# Patient Record
Sex: FEMALE | Race: WHITE | Employment: PART TIME | URBAN - METROPOLITAN AREA
[De-identification: names, ages, dates, MRNs, and addresses within clinical notes are randomized per-mention and may not be internally consistent; named-entity substitution may affect disease eponyms.]

---

## 2017-02-03 RX ORDER — PHENTERMINE HYDROCHLORIDE 37.5 MG/1
37.5 TABLET ORAL
Qty: 30 TABLET | Refills: 0 | OUTPATIENT
Start: 2017-02-03

## 2017-02-06 RX ORDER — PHENTERMINE HYDROCHLORIDE 37.5 MG/1
37.5 TABLET ORAL
Qty: 30 TABLET | Refills: 0 | OUTPATIENT
Start: 2017-02-06

## 2017-02-07 ENCOUNTER — APPOINTMENT (OUTPATIENT)
Dept: LAB | Age: 32
End: 2017-02-07
Attending: FAMILY MEDICINE
Payer: COMMERCIAL

## 2017-02-07 ENCOUNTER — OFFICE VISIT (OUTPATIENT)
Dept: FAMILY MEDICINE CLINIC | Facility: CLINIC | Age: 32
End: 2017-02-07

## 2017-02-07 ENCOUNTER — TELEPHONE (OUTPATIENT)
Dept: FAMILY MEDICINE CLINIC | Facility: CLINIC | Age: 32
End: 2017-02-07

## 2017-02-07 VITALS
HEIGHT: 63.8 IN | TEMPERATURE: 98 F | DIASTOLIC BLOOD PRESSURE: 83 MMHG | HEART RATE: 90 BPM | SYSTOLIC BLOOD PRESSURE: 120 MMHG | WEIGHT: 223.81 LBS | BODY MASS INDEX: 38.68 KG/M2

## 2017-02-07 DIAGNOSIS — IMO0002 ABNORMAL HUMAN CHORIONIC GONADOTROPIN (HCG): Primary | ICD-10-CM

## 2017-02-07 DIAGNOSIS — R63.5 WEIGHT GAIN: ICD-10-CM

## 2017-02-07 DIAGNOSIS — R63.5 WEIGHT GAIN: Primary | ICD-10-CM

## 2017-02-07 LAB — TSH SERPL-ACNC: 1.14 UIU/ML (ref 0.34–5.6)

## 2017-02-07 PROCEDURE — 36415 COLL VENOUS BLD VENIPUNCTURE: CPT

## 2017-02-07 PROCEDURE — 84703 CHORIONIC GONADOTROPIN ASSAY: CPT

## 2017-02-07 PROCEDURE — 84443 ASSAY THYROID STIM HORMONE: CPT

## 2017-02-07 PROCEDURE — 99212 OFFICE O/P EST SF 10 MIN: CPT | Performed by: FAMILY MEDICINE

## 2017-02-07 PROCEDURE — 99213 OFFICE O/P EST LOW 20 MIN: CPT | Performed by: FAMILY MEDICINE

## 2017-02-07 RX ORDER — PHENTERMINE HYDROCHLORIDE 37.5 MG/1
1 TABLET ORAL EVERY MORNING
COMMUNITY
Start: 2017-02-03 | End: 2017-04-19

## 2017-02-07 NOTE — TELEPHONE ENCOUNTER
Pt informed regarding results and Dr Manzanares Scripture new orders as stated below. Pt agrees and will not take phentermine and will have lab redrawn tomorrow.

## 2017-02-07 NOTE — PROGRESS NOTES
Blood pressure 120/83, pulse 90, temperature 97.9 °F (36.6 °C), temperature source Oral, height 5' 3.8\" (1.621 m), weight 223 lb 12.8 oz (101.515 kg), not currently breastfeeding. Patient presents today with a complaint of obesity.   Started taking phen

## 2017-02-08 ENCOUNTER — APPOINTMENT (OUTPATIENT)
Dept: LAB | Age: 32
End: 2017-02-08
Attending: FAMILY MEDICINE
Payer: COMMERCIAL

## 2017-02-08 ENCOUNTER — TELEPHONE (OUTPATIENT)
Dept: FAMILY MEDICINE CLINIC | Facility: CLINIC | Age: 32
End: 2017-02-08

## 2017-02-08 DIAGNOSIS — R63.5 WEIGHT GAIN: ICD-10-CM

## 2017-02-08 DIAGNOSIS — IMO0002 ABNORMAL HUMAN CHORIONIC GONADOTROPIN (HCG): ICD-10-CM

## 2017-02-08 LAB
CHOLEST SERPL-MCNC: 145 MG/DL (ref 110–200)
GLUCOSE SERPL-MCNC: 111 MG/DL (ref 70–99)
HCG SERPL QL: POSITIVE
HDLC SERPL-MCNC: 51 MG/DL
LDLC SERPL CALC-MCNC: 81 MG/DL (ref 0–99)
NONHDLC SERPL-MCNC: 94 MG/DL
TRIGL SERPL-MCNC: 63 MG/DL (ref 1–149)

## 2017-02-08 PROCEDURE — 82947 ASSAY GLUCOSE BLOOD QUANT: CPT

## 2017-02-08 PROCEDURE — 84703 CHORIONIC GONADOTROPIN ASSAY: CPT

## 2017-02-08 PROCEDURE — 36415 COLL VENOUS BLD VENIPUNCTURE: CPT

## 2017-02-08 PROCEDURE — 80061 LIPID PANEL: CPT

## 2017-02-09 ENCOUNTER — PATIENT MESSAGE (OUTPATIENT)
Dept: FAMILY MEDICINE CLINIC | Facility: CLINIC | Age: 32
End: 2017-02-09

## 2017-02-10 NOTE — TELEPHONE ENCOUNTER
From: Jesus Mejia  To: Renetta Feliciano DO  Sent: 2/9/2017 9:02 AM CST  Subject: Test Results Question    Hi Good morning,    I had lab work done 2 days in a row for my HCG levels and was wondering if the test results can also be uploaded he in My C

## 2017-02-15 NOTE — TELEPHONE ENCOUNTER
Pt name &  verified. Result note reviewed per  [facility] [place]. Pt verbalized understanding and denied any further questions at this time.

## 2017-02-22 ENCOUNTER — OFFICE VISIT (OUTPATIENT)
Dept: OBGYN CLINIC | Facility: CLINIC | Age: 32
End: 2017-02-22

## 2017-02-22 VITALS
BODY MASS INDEX: 40.93 KG/M2 | HEART RATE: 103 BPM | SYSTOLIC BLOOD PRESSURE: 131 MMHG | HEIGHT: 63 IN | WEIGHT: 231 LBS | DIASTOLIC BLOOD PRESSURE: 83 MMHG

## 2017-02-22 DIAGNOSIS — N92.6 MISSED MENSES: Primary | ICD-10-CM

## 2017-02-22 PROBLEM — E66.01 MORBID OBESITY WITH BODY MASS INDEX (BMI) OF 40.0 OR HIGHER (HCC): Status: ACTIVE | Noted: 2017-02-22

## 2017-02-22 LAB
CONTROL LINE PRESENT WITH A CLEAR BACKGROUND (YES/NO): YES YES/NO
KIT LOT #: NORMAL NUMERIC
PREGNANCY TEST, URINE: POSITIVE

## 2017-02-22 PROCEDURE — 81025 URINE PREGNANCY TEST: CPT | Performed by: OBSTETRICS & GYNECOLOGY

## 2017-02-22 PROCEDURE — 99213 OFFICE O/P EST LOW 20 MIN: CPT | Performed by: OBSTETRICS & GYNECOLOGY

## 2017-02-22 NOTE — PROGRESS NOTES
HPI:    Patient ID: Helen Fontanez is a 27year old female. HPI  Patient here for PCV. Discussed PNC and PNV. S/P Missed Ab with D & C last year. Miscarriage Precautions reviewed. Optional and required screening reviewed.   Diet and exercise discus

## 2017-03-01 ENCOUNTER — TELEPHONE (OUTPATIENT)
Dept: OBGYN CLINIC | Facility: CLINIC | Age: 32
End: 2017-03-01

## 2017-03-01 ENCOUNTER — HOSPITAL ENCOUNTER (OUTPATIENT)
Dept: ULTRASOUND IMAGING | Facility: HOSPITAL | Age: 32
Discharge: HOME | End: 2017-03-01
Attending: OBSTETRICS & GYNECOLOGY
Payer: COMMERCIAL

## 2017-03-01 DIAGNOSIS — O26.859 SPOTTING IN EARLY PREGNANCY: Primary | ICD-10-CM

## 2017-03-01 DIAGNOSIS — O26.859 SPOTTING IN EARLY PREGNANCY: ICD-10-CM

## 2017-03-01 PROCEDURE — 76817 TRANSVAGINAL US OBSTETRIC: CPT

## 2017-03-01 PROCEDURE — 76801 OB US < 14 WKS SINGLE FETUS: CPT

## 2017-03-01 NOTE — TELEPHONE ENCOUNTER
Pt approx 7 weeks pregnant. Brownish spotting yesterday, only when she wiped x2. Once today brownish with some yellow. Minimal cramping. No intercourse in past 48 hours. Hx of missed Ab in April, had D&C.     Per JF pt come be evaluated today in office o

## 2017-03-08 ENCOUNTER — NURSE ONLY (OUTPATIENT)
Dept: OBGYN CLINIC | Facility: CLINIC | Age: 32
End: 2017-03-08

## 2017-03-08 DIAGNOSIS — Z34.01 ENCOUNTER FOR SUPERVISION OF NORMAL FIRST PREGNANCY IN FIRST TRIMESTER: Primary | ICD-10-CM

## 2017-03-08 NOTE — PROGRESS NOTES
Pt and spouce here for Ob nurse Education visit. Pt currently 7w0d with DARINEL of 10/25 based on u/s dating. Educational Material Reviewed including nutrition, exercise, restrictions and warning signs.  PN labs ordered including 1 hr gtt and TSH due to eleva

## 2017-03-21 ENCOUNTER — LAB ENCOUNTER (OUTPATIENT)
Dept: LAB | Age: 32
End: 2017-03-21
Attending: OBSTETRICS & GYNECOLOGY
Payer: COMMERCIAL

## 2017-03-21 DIAGNOSIS — Z34.01 ENCOUNTER FOR SUPERVISION OF NORMAL FIRST PREGNANCY IN FIRST TRIMESTER: ICD-10-CM

## 2017-03-21 LAB
ANTIBODY SCREEN: NEGATIVE
BASOPHILS # BLD: 0.1 K/UL (ref 0–0.2)
BASOPHILS NFR BLD: 1 %
BILIRUB UR QL: NEGATIVE
COLOR UR: YELLOW
EOSINOPHIL # BLD: 0.1 K/UL (ref 0–0.7)
EOSINOPHIL NFR BLD: 1 %
ERYTHROCYTE [DISTWIDTH] IN BLOOD BY AUTOMATED COUNT: 13.4 % (ref 11–15)
GLUCOSE 1H P 50 G GLC PO SERPL-MCNC: 173 MG/DL
GLUCOSE UR-MCNC: NEGATIVE MG/DL
HCT VFR BLD AUTO: 39 % (ref 35–48)
HGB BLD-MCNC: 13.3 G/DL (ref 12–16)
HGB UR QL STRIP.AUTO: NEGATIVE
LYMPHOCYTES # BLD: 2.3 K/UL (ref 1–4)
LYMPHOCYTES NFR BLD: 21 %
MCH RBC QN AUTO: 32.4 PG (ref 27–32)
MCHC RBC AUTO-ENTMCNC: 34.2 G/DL (ref 32–37)
MCV RBC AUTO: 94.8 FL (ref 80–100)
MONOCYTES # BLD: 0.4 K/UL (ref 0–1)
MONOCYTES NFR BLD: 4 %
NEUTROPHILS # BLD AUTO: 7.9 K/UL (ref 1.8–7.7)
NEUTROPHILS NFR BLD: 73 %
NITRITE UR QL STRIP.AUTO: NEGATIVE
PH UR: 6 [PH] (ref 5–8)
PLATELET # BLD AUTO: 225 K/UL (ref 140–400)
PMV BLD AUTO: 10.7 FL (ref 7.4–10.3)
PROT UR-MCNC: 30 MG/DL
RBC # BLD AUTO: 4.12 M/UL (ref 3.7–5.4)
RBC #/AREA URNS AUTO: 0 /HPF
RH BLOOD TYPE: POSITIVE
SP GR UR STRIP: 1.03 (ref 1–1.03)
TSH SERPL-ACNC: 0.9 UIU/ML (ref 0.34–5.6)
UROBILINOGEN UR STRIP-ACNC: <2
VIT C UR-MCNC: 40 MG/DL
WBC # BLD AUTO: 10.7 K/UL (ref 4–11)
WBC #/AREA URNS AUTO: 18 /HPF

## 2017-03-21 PROCEDURE — 86900 BLOOD TYPING SEROLOGIC ABO: CPT

## 2017-03-21 PROCEDURE — 87389 HIV-1 AG W/HIV-1&-2 AB AG IA: CPT

## 2017-03-21 PROCEDURE — 86850 RBC ANTIBODY SCREEN: CPT

## 2017-03-21 PROCEDURE — 86762 RUBELLA ANTIBODY: CPT

## 2017-03-21 PROCEDURE — 85025 COMPLETE CBC W/AUTO DIFF WBC: CPT

## 2017-03-21 PROCEDURE — 36415 COLL VENOUS BLD VENIPUNCTURE: CPT

## 2017-03-21 PROCEDURE — 84443 ASSAY THYROID STIM HORMONE: CPT

## 2017-03-21 PROCEDURE — 81001 URINALYSIS AUTO W/SCOPE: CPT

## 2017-03-21 PROCEDURE — 86901 BLOOD TYPING SEROLOGIC RH(D): CPT

## 2017-03-21 PROCEDURE — 87340 HEPATITIS B SURFACE AG IA: CPT

## 2017-03-21 PROCEDURE — 87086 URINE CULTURE/COLONY COUNT: CPT

## 2017-03-21 PROCEDURE — 86780 TREPONEMA PALLIDUM: CPT

## 2017-03-21 PROCEDURE — 82950 GLUCOSE TEST: CPT

## 2017-03-22 ENCOUNTER — TELEPHONE (OUTPATIENT)
Dept: OBGYN CLINIC | Facility: CLINIC | Age: 32
End: 2017-03-22

## 2017-03-22 DIAGNOSIS — R73.09 ABNORMAL GTT (GLUCOSE TOLERANCE TEST): Primary | ICD-10-CM

## 2017-03-22 LAB
HBV SURFACE AG SERPL QL IA: NONREACTIVE
HIV1+2 AB SERPL QL IA: NONREACTIVE
RUBV IGG SER-ACNC: 19 IU/ML

## 2017-03-22 RX ORDER — NITROFURANTOIN 25; 75 MG/1; MG/1
100 CAPSULE ORAL 2 TIMES DAILY
Qty: 10 CAPSULE | Refills: 0 | Status: SHIPPED | OUTPATIENT
Start: 2017-03-22 | End: 2017-03-27

## 2017-03-23 NOTE — TELEPHONE ENCOUNTER
----- Message from Rogelio Greenfield MD sent at 3/21/2017  2:47 PM CDT -----  U/A c/w UTI. Send in Macrobid 100 mg BID x 5 days.   #10

## 2017-03-23 NOTE — TELEPHONE ENCOUNTER
----- Message from Tomer Burton MD sent at 3/21/2017  1:37 PM CDT -----  Abnormal 1 hr GTT. Send for 3 hr GTT ASAP.

## 2017-03-24 LAB — T PALLIDUM AB SER QL: NEGATIVE

## 2017-03-29 ENCOUNTER — TELEPHONE (OUTPATIENT)
Dept: OBGYN CLINIC | Facility: CLINIC | Age: 32
End: 2017-03-29

## 2017-03-31 ENCOUNTER — LAB ENCOUNTER (OUTPATIENT)
Dept: LAB | Facility: HOSPITAL | Age: 32
End: 2017-03-31
Attending: OBSTETRICS & GYNECOLOGY
Payer: COMMERCIAL

## 2017-03-31 DIAGNOSIS — R73.09 ABNORMAL GTT (GLUCOSE TOLERANCE TEST): ICD-10-CM

## 2017-03-31 PROCEDURE — 82952 GTT-ADDED SAMPLES: CPT

## 2017-03-31 PROCEDURE — 36415 COLL VENOUS BLD VENIPUNCTURE: CPT

## 2017-03-31 PROCEDURE — 82951 GLUCOSE TOLERANCE TEST (GTT): CPT

## 2017-04-05 ENCOUNTER — TELEPHONE (OUTPATIENT)
Dept: OBGYN CLINIC | Facility: CLINIC | Age: 32
End: 2017-04-05

## 2017-04-05 DIAGNOSIS — O24.319 PREGESTATIONAL DIABETES MELLITUS, MODIFIED WHITE CLASS B: Primary | ICD-10-CM

## 2017-04-05 NOTE — TELEPHONE ENCOUNTER
PT NOTIFIED OF RESULTS AND RECS. PHONE NUMBER GIVEN FOR DIABETIC ED CENTER AND FOR THE [facility] DRS. INTERNAL REFERRAL FOR DIABETIC ED PLACED AND ORDER FAXED TO [facility].

## 2017-04-05 NOTE — TELEPHONE ENCOUNTER
----- Message from Emiliano Sanchez MD sent at 3/31/2017  2:53 PM CDT -----  Abnormal 3 hr GTT. Refer to Diabetes Center. Patient is Pre-gestational Diabetic. Patient also needs to see MFM for consultation for Pre-gestational DM ASAP.

## 2017-04-07 ENCOUNTER — INITIAL PRENATAL (OUTPATIENT)
Dept: OBGYN CLINIC | Facility: CLINIC | Age: 32
End: 2017-04-07

## 2017-04-07 VITALS
DIASTOLIC BLOOD PRESSURE: 85 MMHG | HEART RATE: 88 BPM | SYSTOLIC BLOOD PRESSURE: 142 MMHG | WEIGHT: 226 LBS | BODY MASS INDEX: 40 KG/M2

## 2017-04-07 DIAGNOSIS — O24.419 GESTATIONAL DIABETES MELLITUS (GDM) IN FIRST TRIMESTER, GESTATIONAL DIABETES METHOD OF CONTROL UNSPECIFIED: ICD-10-CM

## 2017-04-07 DIAGNOSIS — Z34.81 OTHER NORMAL PREGNANCY, NOT FIRST, FIRST TRIMESTER: Primary | ICD-10-CM

## 2017-04-07 PROBLEM — O24.319 PREGESTATIONAL DIABETES MELLITUS, MODIFIED WHITE CLASS B: Status: ACTIVE | Noted: 2017-04-07

## 2017-04-07 PROBLEM — O24.319 PREGESTATIONAL DIABETES MELLITUS, MODIFIED WHITE CLASS B (HCC): Status: ACTIVE | Noted: 2017-04-07

## 2017-04-07 PROCEDURE — 81002 URINALYSIS NONAUTO W/O SCOPE: CPT | Performed by: OBSTETRICS & GYNECOLOGY

## 2017-04-07 NOTE — PROGRESS NOTES
GC/Chlamydia Culture Done. Has appointment with MFM for FTS on 4/19/2017. Has appointment with Diabetic Educator next Monday. To do labs,  24 hr Urine, HbA1C, CMP. Will send in referral for MFM consult for Pre-Gestational DM.   Level 2 U/S and [facility]

## 2017-04-10 ENCOUNTER — HOSPITAL ENCOUNTER (OUTPATIENT)
Dept: ENDOCRINOLOGY | Facility: HOSPITAL | Age: 32
Discharge: HOME | End: 2017-04-10
Attending: OBSTETRICS & GYNECOLOGY
Payer: COMMERCIAL

## 2017-04-10 VITALS — WEIGHT: 228.31 LBS | BODY MASS INDEX: 40.45 KG/M2 | HEIGHT: 63 IN

## 2017-04-10 DIAGNOSIS — O24.410 DIET CONTROLLED GESTATIONAL DIABETES MELLITUS (GDM) IN FIRST TRIMESTER: Primary | ICD-10-CM

## 2017-04-10 NOTE — PROGRESS NOTES
Lesvia Khan  : 1985 was seen for Gestational Diabetes Counseling: Individual/Group    Date: 4/10/2017   Start time: 56 End time: 0    Obtained usual diet history:     Education:     GDM Overview:  Reviewed gestational diabetes as diagnosi or concerns. Patient verbalized understanding and has no further questions at this time.     Kd Trevino RN

## 2017-04-11 ENCOUNTER — TELEPHONE (OUTPATIENT)
Dept: OBGYN CLINIC | Facility: CLINIC | Age: 32
End: 2017-04-11

## 2017-04-11 NOTE — TELEPHONE ENCOUNTER
OFFICE RECEIVED FAX REGARDING PRESCRIPTION. STATING THEY ARE UNABLE TO START PRESCRIPTION FOR BAY CONTOUR NEXT MONITOR. PLACED IN NURSE BIN TO BE REVIEWED.

## 2017-04-12 NOTE — TELEPHONE ENCOUNTER
OFFICE RECEIVED ANOTHER FAX REGARDING NAVEEN CONTOUR NEXT MONITOR RX FROM [facility]. PLACED IN NURSE BIN TO BE REVIEWED.

## 2017-04-18 ENCOUNTER — APPOINTMENT (OUTPATIENT)
Dept: LAB | Age: 32
End: 2017-04-18
Attending: OBSTETRICS & GYNECOLOGY
Payer: COMMERCIAL

## 2017-04-18 ENCOUNTER — TELEPHONE (OUTPATIENT)
Dept: OBGYN CLINIC | Facility: CLINIC | Age: 32
End: 2017-04-18

## 2017-04-18 DIAGNOSIS — O24.419 GESTATIONAL DIABETES MELLITUS (GDM) IN FIRST TRIMESTER, GESTATIONAL DIABETES METHOD OF CONTROL UNSPECIFIED: Primary | ICD-10-CM

## 2017-04-18 DIAGNOSIS — O24.419 GESTATIONAL DIABETES MELLITUS (GDM) IN FIRST TRIMESTER, GESTATIONAL DIABETES METHOD OF CONTROL UNSPECIFIED: ICD-10-CM

## 2017-04-18 PROCEDURE — 36415 COLL VENOUS BLD VENIPUNCTURE: CPT

## 2017-04-18 PROCEDURE — 80053 COMPREHEN METABOLIC PANEL: CPT

## 2017-04-18 PROCEDURE — 84156 ASSAY OF PROTEIN URINE: CPT

## 2017-04-18 NOTE — TELEPHONE ENCOUNTER
Called pt x2 to give Dr. Roseanna Crawford message. Phone keeps breaking up during call and unable to hear each other. 24 hr urine for Protein is ordered in computer. Need to discuss with pt.

## 2017-04-18 NOTE — TELEPHONE ENCOUNTER
----- Message from Tamara Altamirano MD sent at 4/18/2017  2:01 PM CDT -----  24 hour urine for protein is in sufficient. The volume is too low for a 24 hour collection. She needs to repeat the 24 hour urine for Protein.   She needs to drink more water an

## 2017-04-18 NOTE — TELEPHONE ENCOUNTER
LM on pt's VM per pt's request. LM that she needs to repeat her 24 hr urine for protein collection. Message left that she did not have enough urine for a 24 hr collection and need to drink more water to produce more urine.  Informed that order is in the com

## 2017-04-19 ENCOUNTER — HOSPITAL ENCOUNTER (OUTPATIENT)
Dept: PERINATAL CARE | Facility: HOSPITAL | Age: 32
Discharge: HOME | End: 2017-04-19
Attending: OBSTETRICS & GYNECOLOGY
Payer: COMMERCIAL

## 2017-04-19 ENCOUNTER — TELEPHONE (OUTPATIENT)
Dept: OBGYN CLINIC | Facility: CLINIC | Age: 32
End: 2017-04-19

## 2017-04-19 VITALS — HEART RATE: 83 BPM | DIASTOLIC BLOOD PRESSURE: 68 MMHG | SYSTOLIC BLOOD PRESSURE: 113 MMHG

## 2017-04-19 DIAGNOSIS — Z36.9 FIRST TRIMESTER SCREENING: ICD-10-CM

## 2017-04-19 DIAGNOSIS — E66.01 MORBID OBESITY WITH BODY MASS INDEX (BMI) OF 40.0 OR HIGHER (HCC): ICD-10-CM

## 2017-04-19 DIAGNOSIS — O24.319 PREGESTATIONAL DIABETES MELLITUS, MODIFIED WHITE CLASS B: ICD-10-CM

## 2017-04-19 DIAGNOSIS — Z36.9 FIRST TRIMESTER SCREENING: Primary | ICD-10-CM

## 2017-04-19 PROCEDURE — 99244 OFF/OP CNSLTJ NEW/EST MOD 40: CPT | Performed by: OBSTETRICS & GYNECOLOGY

## 2017-04-19 PROCEDURE — 76813 OB US NUCHAL MEAS 1 GEST: CPT | Performed by: OBSTETRICS & GYNECOLOGY

## 2017-04-19 PROCEDURE — 76813 OB US NUCHAL MEAS 1 GEST: CPT

## 2017-04-19 NOTE — PROGRESS NOTES
Pt for 1st tri screen  Hx pregest DM  High BMI  Denies pregnancy complaints  NTD lab card lot #[id number]

## 2017-04-19 NOTE — TELEPHONE ENCOUNTER
Spoke with pt and informed pt that Dr. Roby Galicia wants her to see a GI doctor d/t her elevated ALT, 114. [facility] number [phone] given to pt and advised to schedule appt with first available doctor.  Pt verbalized understanding and agrees with

## 2017-04-19 NOTE — PROGRESS NOTES
Outpatient Maternal-Fetal Medicine Consultation    Dear Dr. Maureen Hoang,    Thank you for requesting ultrasound evaluation and maternal fetal medicine consultation on your patient Alexa Tobias.   As you are aware she is a 27year old female with a Singleto Disp: 50 strip, Rfl: 4  •  NAVEEN MICROLET LANCETS Does not apply Misc, 1 lancet by Finger stick route 4 (four) times daily.  Use as directed., Disp: 1 Box, Rfl: 3  •  Prenatal Vit-Fe Fumarate-FA (PRENATAL VITAMINS PLUS) 27-1 MG Oral Tab, Take 1 tablet by mo (IUFD). Infants of diabetic mothers (IDMs) are also at increased risk for hypoglycemia and hyperbilirubinemia.     Congenital Malformations:  Data from multiple studies have consistently shown a higher risk of major congenital malformations and miscarriage Impaired fetal growth is more common among women with diabetic vasculopathy and/or superimposed preeclampsia. It is associated with increased fetal and  morbidity and mortality, and has long-term health implications.   If there is evidence of intr 32 weeks of gestation and increasing the frequency of testing to two times per week from 36 weeks until delivery.  In complicated patients (IUGR, oligohydramnios, preeclampsia, or poorly controlled blood glucose concentrations) testing may start earlier in exercise in the evening. The patient is presently on diet therapy; her compliance with her diabetic diet regimen was reviewed and it is good. Medical Regimen Recommendation: no change (except as noted above about her diet and exercise).     We comp one of the two most common medical complications of the obese . The increased risk of type 2 diabetes is primarily related to an exaggerated increase in insulin resistance in the obese state.  It is reasonable to screen obese gravidas for undiagnosed analysis found that overweight and obese pregnant women experienced significantly more stillbirths than normal weight women. Increase  testing and Level 2 Ultrasound is recommended.      We dicussed the current recommendations for limited gest

## 2017-04-19 NOTE — TELEPHONE ENCOUNTER
----- Message from Sindhu Sanders MD sent at 4/19/2017  2:52 PM CDT -----  Any GI doctor within [facility]. First available.

## 2017-04-19 NOTE — TELEPHONE ENCOUNTER
----- Message from Nura Mejia MD sent at 4/19/2017  5:19 AM CDT -----  Elevated ALT, 114. Please refer patient to GI for a Consultation. Patient has already been diagnoses with Pre-gestational Diabetes.

## 2017-04-19 NOTE — TELEPHONE ENCOUNTER
Notified pt that Dr. Tracie Herrera wants her to see a GI doctor d/t her elevated ALT, 114. [facility] GI office number [phone] given to pt and instructed to make an appt with first available doctor that can see her.  Pt verbalized understanding and ag

## 2017-04-21 ENCOUNTER — HOSPITAL ENCOUNTER (OUTPATIENT)
Dept: ENDOCRINOLOGY | Facility: HOSPITAL | Age: 32
Discharge: HOME | End: 2017-04-21
Attending: OBSTETRICS & GYNECOLOGY
Payer: COMMERCIAL

## 2017-04-21 ENCOUNTER — ROUTINE PRENATAL (OUTPATIENT)
Dept: OBGYN CLINIC | Facility: CLINIC | Age: 32
End: 2017-04-21

## 2017-04-21 VITALS
HEART RATE: 84 BPM | WEIGHT: 225 LBS | SYSTOLIC BLOOD PRESSURE: 132 MMHG | DIASTOLIC BLOOD PRESSURE: 85 MMHG | BODY MASS INDEX: 39 KG/M2

## 2017-04-21 VITALS — WEIGHT: 225.69 LBS | HEIGHT: 64 IN | BODY MASS INDEX: 38.53 KG/M2

## 2017-04-21 DIAGNOSIS — O24.319 PREGESTATIONAL DIABETES MELLITUS, MODIFIED WHITE CLASS B: Primary | ICD-10-CM

## 2017-04-21 DIAGNOSIS — Z34.82 OTHER NORMAL PREGNANCY, NOT FIRST, SECOND TRIMESTER: Primary | ICD-10-CM

## 2017-04-21 PROCEDURE — 81002 URINALYSIS NONAUTO W/O SCOPE: CPT | Performed by: OBSTETRICS & GYNECOLOGY

## 2017-04-21 NOTE — PROGRESS NOTES
Radha Whittington  : 1985 was seen for GDM Follow-Up Counseling    Date: 2017   Start time:   End time:     Assessment: Ht 64\"  Wt 225 lb 11.2 oz  BMI 38.72 kg/m2  LMP 2017 (Exact Date)  Weight: Wt Readings from Last 6 Encounters Reducing fat and omitting the high calorie, low nutrient density foods in favor of lean protein, whole grains, fresh fruit and vegetables. Discussed adding milk to at least two meals and including [place] yogurt to improve her protein intake.      Being Activ

## 2017-04-21 NOTE — PROGRESS NOTES
Follow-up for class B, type 2 diabetes. Has been following diabetic diet. Has majority fasting blood sugars elevated as high as 110.  2 hour postprandial values within normal limits. Recommend starting NPH insulin at bedtime 4 units.   To receive instruc

## 2017-04-22 ENCOUNTER — APPOINTMENT (OUTPATIENT)
Dept: LAB | Age: 32
End: 2017-04-22
Attending: OBSTETRICS & GYNECOLOGY
Payer: COMMERCIAL

## 2017-04-22 DIAGNOSIS — O24.419 GESTATIONAL DIABETES MELLITUS (GDM) IN FIRST TRIMESTER, GESTATIONAL DIABETES METHOD OF CONTROL UNSPECIFIED: ICD-10-CM

## 2017-04-22 PROCEDURE — 84156 ASSAY OF PROTEIN URINE: CPT

## 2017-04-24 ENCOUNTER — HOSPITAL ENCOUNTER (OUTPATIENT)
Dept: ENDOCRINOLOGY | Facility: HOSPITAL | Age: 32
Discharge: HOME | End: 2017-04-24
Attending: OBSTETRICS & GYNECOLOGY
Payer: COMMERCIAL

## 2017-04-24 ENCOUNTER — TELEPHONE (OUTPATIENT)
Dept: OBGYN CLINIC | Facility: CLINIC | Age: 32
End: 2017-04-24

## 2017-04-24 ENCOUNTER — TELEPHONE (OUTPATIENT)
Dept: PERINATAL CARE | Facility: HOSPITAL | Age: 32
End: 2017-04-24

## 2017-04-24 VITALS — WEIGHT: 224.13 LBS | BODY MASS INDEX: 38 KG/M2

## 2017-04-24 DIAGNOSIS — O24.319 PREGESTATIONAL DIABETES MELLITUS, MODIFIED WHITE CLASS B: Primary | ICD-10-CM

## 2017-04-24 NOTE — PROGRESS NOTES
Nazia Siu  : 1985 was seen for Injection Instruction:    Date: 2017 Start time: 930 End time: 1000    Wt 224 lb 1.6 oz  LMP 2017 (Exact Date)      Medication type, dose and frequency: NPH insulin 4 units daily at [facility] [place]    Syringe:

## 2017-04-24 NOTE — TELEPHONE ENCOUNTER
Pharmacy woul like to know if they can dispense a 10ml bottle instead of a 3ml bottle of NPH Insulin. Pharmacy states it does not come in a 3 ml bottle and her insurance will cover a 10ml bottle. Pharmacy was advises this would be OK.

## 2017-04-25 ENCOUNTER — TELEPHONE (OUTPATIENT)
Dept: PERINATAL CARE | Facility: HOSPITAL | Age: 32
End: 2017-04-25

## 2017-04-26 ENCOUNTER — OFFICE VISIT (OUTPATIENT)
Dept: GASTROENTEROLOGY | Facility: CLINIC | Age: 32
End: 2017-04-26

## 2017-04-26 VITALS
HEART RATE: 84 BPM | WEIGHT: 226 LBS | HEIGHT: 64.5 IN | BODY MASS INDEX: 38.11 KG/M2 | DIASTOLIC BLOOD PRESSURE: 79 MMHG | SYSTOLIC BLOOD PRESSURE: 118 MMHG

## 2017-04-26 DIAGNOSIS — R79.89 ELEVATED LFTS: Primary | ICD-10-CM

## 2017-04-26 PROCEDURE — 99243 OFF/OP CNSLTJ NEW/EST LOW 30: CPT | Performed by: NURSE PRACTITIONER

## 2017-04-26 PROCEDURE — 99212 OFFICE O/P EST SF 10 MIN: CPT | Performed by: NURSE PRACTITIONER

## 2017-04-26 NOTE — PATIENT INSTRUCTIONS
1. Complete labs/Ultrasound of liver - I will call you with the results    2.  If all the above tests are normal, we will continue to monitor your liver enzymes about every 4 weeks through the pregnancy

## 2017-04-26 NOTE — H&P
[address] [facility] [address] Gastroenterology                                                                                                  Clinic History and Physical     Pa History   Diagnosis Date   • Anemia      2-3 months after D&C   • Obesity, Class III, BMI 40-49.9 (morbid obesity) ([facility] [address] 2017   • Gestational diabetes 04/2017          Past Surgical History    CHOLECYSTECTOMY  2003    D & C  04/29/2016      Family Hx:   Fa HPI  GENITOURINARY:  negative for dysuria or gross hematuria  INTEGUMENT/BREAST:  SKIN:  negative for jaundice   ALLERGIC/IMMUNOLOGIC:  negative for hay fever  ENDOCRINE:  negative for cold intolerance and heat intolerance  MUSCULOSKELETAL:  negative for j consider standing order for hepatic panel Q4 weeks during the course of the pregnancy to evaluate LFT trends and possibly complications. Discussed in detail with the patient and she is amenable to this plan.          Recommend:  See above      Orders This V

## 2017-05-02 ENCOUNTER — TELEPHONE (OUTPATIENT)
Dept: PERINATAL CARE | Facility: HOSPITAL | Age: 32
End: 2017-05-02

## 2017-05-02 NOTE — TELEPHONE ENCOUNTER
Blood glucose log from 4/11/17 through 5/1/17 reviewed by Dr. Delvin Dowling  change with current regime. 6 units NPH in AM      8 units NPH at [facility] contacted by phone and updated. She verbalized understanding. Will continue to send weekly logs.   Record

## 2017-05-03 ENCOUNTER — ROUTINE PRENATAL (OUTPATIENT)
Dept: OBGYN CLINIC | Facility: CLINIC | Age: 32
End: 2017-05-03

## 2017-05-03 VITALS
SYSTOLIC BLOOD PRESSURE: 116 MMHG | WEIGHT: 224 LBS | DIASTOLIC BLOOD PRESSURE: 73 MMHG | HEART RATE: 80 BPM | BODY MASS INDEX: 38 KG/M2

## 2017-05-03 DIAGNOSIS — Z34.82 OTHER NORMAL PREGNANCY, NOT FIRST, SECOND TRIMESTER: Primary | ICD-10-CM

## 2017-05-04 ENCOUNTER — TELEPHONE (OUTPATIENT)
Dept: PERINATAL CARE | Facility: HOSPITAL | Age: 32
End: 2017-05-04

## 2017-05-04 ENCOUNTER — LAB ENCOUNTER (OUTPATIENT)
Dept: LAB | Age: 32
End: 2017-05-04
Attending: NURSE PRACTITIONER
Payer: COMMERCIAL

## 2017-05-04 DIAGNOSIS — O24.419 GESTATIONAL DIABETES MELLITUS IN PREGNANCY: Primary | ICD-10-CM

## 2017-05-04 DIAGNOSIS — Z34.82 OTHER NORMAL PREGNANCY, NOT FIRST, SECOND TRIMESTER: ICD-10-CM

## 2017-05-04 DIAGNOSIS — R79.89 ELEVATED LFTS: ICD-10-CM

## 2017-05-04 PROCEDURE — 82728 ASSAY OF FERRITIN: CPT

## 2017-05-04 PROCEDURE — 87340 HEPATITIS B SURFACE AG IA: CPT

## 2017-05-04 PROCEDURE — 86708 HEPATITIS A ANTIBODY: CPT

## 2017-05-04 PROCEDURE — 82103 ALPHA-1-ANTITRYPSIN TOTAL: CPT

## 2017-05-04 PROCEDURE — 86704 HEP B CORE ANTIBODY TOTAL: CPT

## 2017-05-04 PROCEDURE — 86038 ANTINUCLEAR ANTIBODIES: CPT

## 2017-05-04 PROCEDURE — 82105 ALPHA-FETOPROTEIN SERUM: CPT

## 2017-05-04 PROCEDURE — 36415 COLL VENOUS BLD VENIPUNCTURE: CPT

## 2017-05-04 PROCEDURE — 84466 ASSAY OF TRANSFERRIN: CPT

## 2017-05-04 PROCEDURE — 80500 HEPATITIS A B + C PROFILE: CPT

## 2017-05-04 PROCEDURE — 86803 HEPATITIS C AB TEST: CPT

## 2017-05-04 PROCEDURE — 83540 ASSAY OF IRON: CPT

## 2017-05-04 PROCEDURE — 86706 HEP B SURFACE ANTIBODY: CPT

## 2017-05-04 PROCEDURE — 83036 HEMOGLOBIN GLYCOSYLATED A1C: CPT

## 2017-05-04 PROCEDURE — 82390 ASSAY OF CERULOPLASMIN: CPT

## 2017-05-04 NOTE — PROGRESS NOTES
Pt with fm noted. S/p mfm appt 4/19. Has u/s level 2 this month. Pt sending accucheck results to mfm weekly for adjustments for insulin.

## 2017-05-04 NOTE — TELEPHONE ENCOUNTER
Dr. Luther Self reviewed glucose log from 4/25/17 through 5/4/17  She recommends that Evelyn Lane increase her HS Insulin to 10 units of NPH and to   Keep her AM insulin at 6 units NPH  Dora verbalized understanding and will increase dose tonight.   Instruc

## 2017-05-09 ENCOUNTER — TELEPHONE (OUTPATIENT)
Dept: GASTROENTEROLOGY | Facility: CLINIC | Age: 32
End: 2017-05-09

## 2017-05-09 ENCOUNTER — HOSPITAL ENCOUNTER (OUTPATIENT)
Dept: ULTRASOUND IMAGING | Facility: HOSPITAL | Age: 32
Discharge: HOME | End: 2017-05-09
Attending: NURSE PRACTITIONER
Payer: COMMERCIAL

## 2017-05-09 DIAGNOSIS — R79.89 ELEVATED LFTS: ICD-10-CM

## 2017-05-09 DIAGNOSIS — R79.89 ELEVATED LFTS: Primary | ICD-10-CM

## 2017-05-09 PROCEDURE — 76705 ECHO EXAM OF ABDOMEN: CPT | Performed by: NURSE PRACTITIONER

## 2017-05-09 NOTE — TELEPHONE ENCOUNTER
Nursing: Please let the patient know I got all of her labs and liver ultrasound back. Everything looks within normal limits. Hepatitis panel indicates immunity to hepatitis B.     In light of this, I would continue to monitor her liver enzymes through the

## 2017-05-09 NOTE — TELEPHONE ENCOUNTER
Pt contacted and reviewed [facility] message below, the patient was appreciative and verbalized understanding. She is aware of the standing order and will complete those every 4 weeks as directed. No further questions at this time.

## 2017-05-12 ENCOUNTER — TELEPHONE (OUTPATIENT)
Dept: OBGYN CLINIC | Facility: CLINIC | Age: 32
End: 2017-05-12

## 2017-05-12 ENCOUNTER — TELEPHONE (OUTPATIENT)
Dept: PERINATAL CARE | Facility: HOSPITAL | Age: 32
End: 2017-05-12

## 2017-05-12 NOTE — TELEPHONE ENCOUNTER
Pt 17 weeks pregnant and she needs a TB test for work. Is the injection safe with pregnancy? If not, can she get a note for employee health.

## 2017-05-12 NOTE — TELEPHONE ENCOUNTER
Blood glucose log from 5/3/17 through 5/12/17 reviewed by Dr. Shannen Rome   change with current regime. Increase evening NPH to 14 units  Keep am NPH at 6 units  mathew contacted by phone and updated. She verbalized understanding.  Will continue to send week

## 2017-05-12 NOTE — TELEPHONE ENCOUNTER
[employer] PT'S JOB IS REQUESTING A TB CLEARANCE CAN SHE GET THE TN INJECTION ?  IF NOT CAN SHE GET A NOTE FOR EMPLOYEE HEALTH

## 2017-05-23 ENCOUNTER — TELEPHONE (OUTPATIENT)
Dept: PERINATAL CARE | Facility: HOSPITAL | Age: 32
End: 2017-05-23

## 2017-05-23 NOTE — TELEPHONE ENCOUNTER
Blood glucose log from 5/3/17 through 5/23/17 reviewed by Dr. Abeba Jerome   change with current regime. Increase NPH in am to 10 units and NPH at HS to 16 units  Juwan contacted by phone and updated. She verbalized understanding.  Will continue to send week

## 2017-05-24 ENCOUNTER — TELEPHONE (OUTPATIENT)
Dept: OBGYN CLINIC | Facility: CLINIC | Age: 32
End: 2017-05-24

## 2017-05-24 ENCOUNTER — HOSPITAL ENCOUNTER (OUTPATIENT)
Dept: ENDOCRINOLOGY | Facility: HOSPITAL | Age: 32
Discharge: HOME | End: 2017-05-24
Attending: OBSTETRICS & GYNECOLOGY
Payer: COMMERCIAL

## 2017-05-24 VITALS — BODY MASS INDEX: 38.21 KG/M2 | WEIGHT: 223.81 LBS | HEIGHT: 64 IN

## 2017-05-24 DIAGNOSIS — O24.319 PREGESTATIONAL DIABETES MELLITUS, MODIFIED WHITE CLASS B: Primary | ICD-10-CM

## 2017-05-24 NOTE — TELEPHONE ENCOUNTER
Per Candida Mccoy from diabetes Center, see note  Blood Glucose: FB-101 mg. PP: B: 105-147 mg; L: 101-141; D: 112-147 mg.  83% of PP bkfst & 80% of PP dinner readings are above 120 mg/dl. Ketones: All recorded as negative.    Recommendations:                  7

## 2017-05-24 NOTE — PROGRESS NOTES
Radha Stanislawaviva  : 1985 was seen for GDM Follow-Up Counseling    Date: 2017   Start time: 1355  End time: 1500    Assessment: Ht 64\"  Wt 223 lb 12.8 oz  BMI 38.40 kg/m2  LMP 2017 (Exact Date)  Weight: Wt Readings from Last 6 Encounters PP is > 120 twice in 1 week at any one meal.  Call Diabetic Educator is ketones are consistently elevated. Taking Medication:  Reviewed appropriate timing of insulin. Reviewed onset, peak times and duration.  Discussed possibility of needing rapid acting

## 2017-05-24 NOTE — TELEPHONE ENCOUNTER
ARYAN CALLING FROM  [facility]  / ARYAN WOULD LIKE TO SPEAK WITH THE NURSE BECAUSE THE PT BLOOD SUGAR IS HIGH / ARYAN WANT THE DR TO SEE THE NOTES / HERE'S ANOTHER NUMBER TO GET IN TOUCH WITH ARYAN [phone] / ALDO ADV

## 2017-05-27 NOTE — TELEPHONE ENCOUNTER
Insulin dosing recently increased per MFM. Please inform pt to contact [facility] Tuesday with her BS values to determine if she will need to add short acting insulin Novolog before breakfast and dinner as was suggested by [facility].

## 2017-05-27 NOTE — TELEPHONE ENCOUNTER
Pt called and informed to call Tuesday 5/30/17 with glucose results to determine her plan of care. Pt voices understanding and that she will call Tuesday.

## 2017-05-31 ENCOUNTER — HOSPITAL ENCOUNTER (OUTPATIENT)
Dept: PERINATAL CARE | Facility: HOSPITAL | Age: 32
Discharge: HOME | End: 2017-05-31
Attending: OBSTETRICS & GYNECOLOGY
Payer: COMMERCIAL

## 2017-05-31 VITALS
SYSTOLIC BLOOD PRESSURE: 136 MMHG | HEIGHT: 63 IN | WEIGHT: 223 LBS | RESPIRATION RATE: 16 BRPM | HEART RATE: 95 BPM | DIASTOLIC BLOOD PRESSURE: 66 MMHG | BODY MASS INDEX: 39.51 KG/M2

## 2017-05-31 DIAGNOSIS — Z79.4 DIABETES MELLITUS, TYPE II, INSULIN DEPENDENT (HCC): ICD-10-CM

## 2017-05-31 DIAGNOSIS — Z36.3 SCREENING, ANTENATAL, FOR MALFORMATION BY ULTRASOUND: ICD-10-CM

## 2017-05-31 DIAGNOSIS — O24.319 PREGESTATIONAL DIABETES MELLITUS, MODIFIED WHITE CLASS B: Primary | ICD-10-CM

## 2017-05-31 DIAGNOSIS — E66.01 MORBID OBESITY WITH BODY MASS INDEX (BMI) OF 40.0 OR HIGHER (HCC): ICD-10-CM

## 2017-05-31 DIAGNOSIS — E11.9 DIABETES MELLITUS, TYPE II, INSULIN DEPENDENT (HCC): ICD-10-CM

## 2017-05-31 DIAGNOSIS — O24.319 PREGESTATIONAL DIABETES MELLITUS, MODIFIED WHITE CLASS B: ICD-10-CM

## 2017-05-31 PROCEDURE — 76811 OB US DETAILED SNGL FETUS: CPT | Performed by: OBSTETRICS & GYNECOLOGY

## 2017-05-31 PROCEDURE — 99214 OFFICE O/P EST MOD 30 MIN: CPT | Performed by: OBSTETRICS & GYNECOLOGY

## 2017-05-31 RX ORDER — INSULIN ASPART 100 [IU]/ML
4 INJECTION, SOLUTION INTRAVENOUS; SUBCUTANEOUS 2 TIMES DAILY
Qty: 1 VIAL | Refills: 2 | Status: SHIPPED | OUTPATIENT
Start: 2017-05-31 | End: 2017-09-05

## 2017-05-31 NOTE — PROGRESS NOTES
Pt for level 2 U/S  Hx A2gdm and obesity  Denies pregnancy complaints  Pt states she feels fetal mvmt

## 2017-05-31 NOTE — PROGRESS NOTES
[facility] Serum Consultation    Dear Dr. Kota Meza,    Thank you for requesting ultrasound evaluation and maternal fetal medicine consultation on your patient Mimi Garcia As you are aware she is a 27year old female with a Singleto intrauterine fetal demise (IUFD).   Infants of diabetic mothers (IDMs) are also at increased risk for hypoglycemia and hyperbilirubinemia.     Congenital Malformations:  Data from multiple studies have consistently shown a higher risk of major congenital mal these complications.    Impaired fetal growth is more common among women with diabetic vasculopathy and/or superimposed preeclampsia.  It is associated with increased fetal and  morbidity and mortality, and has long-term health implications.  If the starting weekly NST's at 32 weeks of gestation and increasing the frequency of testing to two times per week from 36 weeks until delivery.  In complicated patients (IUGR, oligohydramnios, preeclampsia, or poorly controlled blood glucose concentrations) test dinner.  We also discussed exercise in the evening. The patient is presently on diet & NPH therapy; her compliance with her diabetic diet regimen was reviewed and it is good.      DIABETES - follow-up  ADA diet recommendations were reviewed and the cherry pregnancy has been consistently reported.  In particular, maternal weight and BMI are independent risk factors for preeclampsia.              Studies have found that the increased risk of  birth in obese gravidas is primarily associated with obesity higher rates of gestational diabetes, hypertensive complications, fetal macrosomia and delivery complications.  Women with weight loss or insufficient weight gain have higher rates of small for gestational age infants.     Macario Velasquez had her questions answer

## 2017-06-01 ENCOUNTER — ROUTINE PRENATAL (OUTPATIENT)
Dept: OBGYN CLINIC | Facility: CLINIC | Age: 32
End: 2017-06-01

## 2017-06-01 VITALS
HEART RATE: 79 BPM | BODY MASS INDEX: 40 KG/M2 | WEIGHT: 223 LBS | DIASTOLIC BLOOD PRESSURE: 76 MMHG | SYSTOLIC BLOOD PRESSURE: 114 MMHG

## 2017-06-01 DIAGNOSIS — O24.414 INSULIN CONTROLLED GESTATIONAL DIABETES MELLITUS (GDM) DURING PREGNANCY, ANTEPARTUM: ICD-10-CM

## 2017-06-01 DIAGNOSIS — Z34.82 OTHER NORMAL PREGNANCY, NOT FIRST, SECOND TRIMESTER: Primary | ICD-10-CM

## 2017-06-01 PROCEDURE — 81002 URINALYSIS NONAUTO W/O SCOPE: CPT | Performed by: OBSTETRICS & GYNECOLOGY

## 2017-06-01 NOTE — PROGRESS NOTES
[address] [facility]  Obstetrics and Gynecology  Prenatal Visit  Jud Estrada MD    HPI   Agustin Fox is a 27year old.o. [id number] 20w0d weeks. Here for routine prenatal visit and is without complaints.   Patient denies any regular uterine contractions, mouth daily. Disp:  Rfl:      Exam   /76 mmHg  Pulse 79  Wt 223 lb (101.152 kg)  LMP 01/12/2017 (Exact Date)    Assessment   Kalin Ramsey is a 27year old female [id number] with viable IUP at 20w0d weeks.   Insulin requiring gestational diabetes, maternal-f

## 2017-06-12 ENCOUNTER — TELEPHONE (OUTPATIENT)
Dept: PERINATAL CARE | Facility: HOSPITAL | Age: 32
End: 2017-06-12

## 2017-06-12 NOTE — TELEPHONE ENCOUNTER
Blood Sugar Log obt    Reviewed by Dr Argueta Alpha  Insulin recommendations  nph 10 u am  4units breakfast  6units dinner      16 u at HS    Pt states understanding and will cont to send updated BS logs

## 2017-06-28 ENCOUNTER — HOSPITAL ENCOUNTER (OUTPATIENT)
Dept: PERINATAL CARE | Facility: HOSPITAL | Age: 32
Discharge: HOME | End: 2017-06-28
Attending: OBSTETRICS & GYNECOLOGY
Payer: COMMERCIAL

## 2017-06-28 VITALS — HEART RATE: 80 BPM | SYSTOLIC BLOOD PRESSURE: 113 MMHG | DIASTOLIC BLOOD PRESSURE: 65 MMHG

## 2017-06-28 DIAGNOSIS — E66.01 MORBID OBESITY WITH BODY MASS INDEX (BMI) OF 40.0 OR HIGHER (HCC): ICD-10-CM

## 2017-06-28 DIAGNOSIS — O24.414 INSULIN CONTROLLED GESTATIONAL DIABETES MELLITUS (GDM) DURING PREGNANCY, ANTEPARTUM: Primary | ICD-10-CM

## 2017-06-28 DIAGNOSIS — O24.414 INSULIN CONTROLLED GESTATIONAL DIABETES MELLITUS (GDM) DURING PREGNANCY, ANTEPARTUM: ICD-10-CM

## 2017-06-28 PROCEDURE — 93325 DOPPLER ECHO COLOR FLOW MAPG: CPT | Performed by: OBSTETRICS & GYNECOLOGY

## 2017-06-28 PROCEDURE — 76825 ECHO EXAM OF FETAL HEART: CPT | Performed by: OBSTETRICS & GYNECOLOGY

## 2017-06-28 PROCEDURE — 99215 OFFICE O/P EST HI 40 MIN: CPT | Performed by: OBSTETRICS & GYNECOLOGY

## 2017-06-28 PROCEDURE — 76827 ECHO EXAM OF FETAL HEART: CPT | Performed by: OBSTETRICS & GYNECOLOGY

## 2017-06-28 NOTE — PROGRESS NOTES
Pt for fetal echo  Hx  pregestational DM obesity   Denies pregnancy complaints  States active fetus  BS reviewed by dr Blanquita Ortega 6/27 no change in management  Pt states understanding

## 2017-06-28 NOTE — PROGRESS NOTES
Outpatient Maternal-Fetal Medicine Consultation     Dear Dr. Hollis Ricketts,     Thank you for requesting ultrasound evaluation and maternal fetal medicine consultation on your patient Kristina Alexandra As you are aware she is a 27year old female with a Single malformations and miscarriage associated with increasing first trimester glycated hemoglobin values.  Although glycated hemoglobin values from different laboratories may not be comparable because of differences in methodology and a lack of standardization a there is evidence of intrauterine growth restriction, which is uncommon, but often related to preeclampsia or preexisting maternal vasculopathy, tests of fetal well-being are initiated.     Preeclampsia:  The incidences of hypertension and preeclampsia are testing may start earlier in gestation and is performed more frequently. Any significant deterioration in maternal status necessitates reevaluation of the fetus.  The frequency of intrauterine fetal death (excluding congenital malformations) with such ava patient's dietary compliance is good.   Her capillary blood glucose records were discussed today; her compliance with the recommended four times daily assessments (fasting and two-hour post-prandial) is good but she needs to be more consistent with her afte delivery.  It has also been hypothesized that obesity may lead to dystocia due to increased soft tissue deposition in the maternal pelvis.    delivery in the obese  is associated with numerous perioperative concerns, including emergency deli snoring and frequent waking)  · She was instructed to send in her BS log weekly for review  · Insulin:  NPH - 14 units in AM and 20 units at HS;  Novolog - 4 units at breakfast and 6 units at dinner     Thank you for allowing me to participate in the care

## 2017-07-03 ENCOUNTER — ROUTINE PRENATAL (OUTPATIENT)
Dept: OBGYN CLINIC | Facility: CLINIC | Age: 32
End: 2017-07-03

## 2017-07-03 VITALS
DIASTOLIC BLOOD PRESSURE: 75 MMHG | SYSTOLIC BLOOD PRESSURE: 109 MMHG | BODY MASS INDEX: 40 KG/M2 | HEART RATE: 90 BPM | WEIGHT: 224 LBS

## 2017-07-03 DIAGNOSIS — Z34.82 OTHER NORMAL PREGNANCY, NOT FIRST, SECOND TRIMESTER: Primary | ICD-10-CM

## 2017-07-03 LAB
APPEARANCE: CLEAR
MULTISTIX LOT#: NORMAL NUMERIC
PH, URINE: 7.5 (ref 4.5–8)
SPECIFIC GRAVITY: 1.01 (ref 1–1.03)
URINE-COLOR: YELLOW
UROBILINOGEN,SEMI-QN: 0.2 MG/DL (ref 0–1.9)

## 2017-07-17 ENCOUNTER — TELEPHONE (OUTPATIENT)
Dept: PERINATAL CARE | Facility: HOSPITAL | Age: 32
End: 2017-07-17

## 2017-07-17 NOTE — TELEPHONE ENCOUNTER
Blood glucose log from 6/29/17 through 7/14/17 reviewed by Dr. Alex Pete insulin amounts  14 u NPH AM   22u NPH HS  6 U Novolog breakfast  8 u novolog dinner  mathew contacted by phone and updated. She verbalized understanding.  Will continue to send w

## 2017-07-19 ENCOUNTER — HOSPITAL ENCOUNTER (OUTPATIENT)
Dept: PERINATAL CARE | Facility: HOSPITAL | Age: 32
Discharge: HOME | End: 2017-07-19
Attending: OBSTETRICS & GYNECOLOGY
Payer: COMMERCIAL

## 2017-07-19 VITALS — HEART RATE: 89 BPM | DIASTOLIC BLOOD PRESSURE: 75 MMHG | SYSTOLIC BLOOD PRESSURE: 117 MMHG

## 2017-07-19 DIAGNOSIS — O24.414 INSULIN CONTROLLED GESTATIONAL DIABETES MELLITUS (GDM) DURING PREGNANCY, ANTEPARTUM: ICD-10-CM

## 2017-07-19 DIAGNOSIS — E66.01 MORBID OBESITY WITH BODY MASS INDEX (BMI) OF 40.0 OR HIGHER (HCC): Primary | ICD-10-CM

## 2017-07-19 DIAGNOSIS — E66.01 MORBID OBESITY WITH BODY MASS INDEX (BMI) OF 40.0 OR HIGHER (HCC): ICD-10-CM

## 2017-07-19 PROCEDURE — 76805 OB US >/= 14 WKS SNGL FETUS: CPT | Performed by: OBSTETRICS & GYNECOLOGY

## 2017-07-19 PROCEDURE — 99213 OFFICE O/P EST LOW 20 MIN: CPT | Performed by: OBSTETRICS & GYNECOLOGY

## 2017-07-19 NOTE — PROGRESS NOTES
Outpatient Maternal-Fetal Medicine Consultation     Dear Dr. Caitlyn Odonnell,     Thank you for requesting ultrasound evaluation and maternal fetal medicine consultation on your patient Timothy Area As you are aware she is a 27year old female with a Single ULTRASOUND REPORT     See imaging tab for complete consultation / ultrasound report or in PACS    Fetal Heart Rate: Present 152 bpm  Fetal Presentation: Breech  Amniotic fluid MVP: WNL  Cord: 3 vessel cord  Placental Location: Posterior     Summary of Ultr

## 2017-07-21 ENCOUNTER — ROUTINE PRENATAL (OUTPATIENT)
Dept: OBGYN CLINIC | Facility: CLINIC | Age: 32
End: 2017-07-21

## 2017-07-21 VITALS
SYSTOLIC BLOOD PRESSURE: 105 MMHG | DIASTOLIC BLOOD PRESSURE: 73 MMHG | WEIGHT: 224.63 LBS | HEART RATE: 72 BPM | BODY MASS INDEX: 40 KG/M2

## 2017-07-21 DIAGNOSIS — Z34.82 ENCOUNTER FOR SUPERVISION OF OTHER NORMAL PREGNANCY IN SECOND TRIMESTER: Primary | ICD-10-CM

## 2017-07-21 LAB
MULTISTIX LOT#: NORMAL NUMERIC
PH, URINE: 6 (ref 4.5–8)
SPECIFIC GRAVITY: 1.01 (ref 1–1.03)
URINE-COLOR: YELLOW
UROBILINOGEN,SEMI-QN: 0.2 MG/DL (ref 0–1.9)

## 2017-08-01 ENCOUNTER — TELEPHONE (OUTPATIENT)
Dept: PERINATAL CARE | Facility: HOSPITAL | Age: 32
End: 2017-08-01

## 2017-08-01 ENCOUNTER — OFFICE VISIT (OUTPATIENT)
Dept: OBGYN CLINIC | Facility: CLINIC | Age: 32
End: 2017-08-01

## 2017-08-01 VITALS
HEART RATE: 77 BPM | WEIGHT: 225.81 LBS | BODY MASS INDEX: 40 KG/M2 | SYSTOLIC BLOOD PRESSURE: 110 MMHG | DIASTOLIC BLOOD PRESSURE: 75 MMHG

## 2017-08-01 DIAGNOSIS — M54.9 BACK PAIN AFFECTING PREGNANCY IN THIRD TRIMESTER: ICD-10-CM

## 2017-08-01 DIAGNOSIS — Z34.83 ENCOUNTER FOR SUPERVISION OF OTHER NORMAL PREGNANCY IN THIRD TRIMESTER: Primary | ICD-10-CM

## 2017-08-01 DIAGNOSIS — Z23 NEED FOR VACCINATION: ICD-10-CM

## 2017-08-01 DIAGNOSIS — O99.891 BACK PAIN AFFECTING PREGNANCY IN THIRD TRIMESTER: ICD-10-CM

## 2017-08-01 LAB
MULTISTIX LOT#: NORMAL NUMERIC
PH, URINE: 7.5 (ref 4.5–8)
SPECIFIC GRAVITY: 1.01 (ref 1–1.03)
URINE-COLOR: YELLOW
UROBILINOGEN,SEMI-QN: 0.2 MG/DL (ref 0–1.9)

## 2017-08-01 PROCEDURE — 90471 IMMUNIZATION ADMIN: CPT | Performed by: ADVANCED PRACTICE MIDWIFE

## 2017-08-01 PROCEDURE — 90715 TDAP VACCINE 7 YRS/> IM: CPT | Performed by: ADVANCED PRACTICE MIDWIFE

## 2017-08-01 RX ORDER — BREAST PUMP
EACH MISCELLANEOUS
Qty: 1 EACH | Refills: 0 | Status: SHIPPED | OUTPATIENT
Start: 2017-08-01 | End: 2017-11-14

## 2017-08-01 NOTE — TELEPHONE ENCOUNTER
Pharmacy for Maryuri Palacios calling for refill of insulin syringes  Initial order was 1x per day insulin  Pt now takes insulin 3x per day  Authorization given to increase amount of syringes dispensed

## 2017-08-01 NOTE — PROGRESS NOTES
S.  Denies HA, URQ pain, swelling, vision change. Baby is active. Works for the clinic. O.  Blood sugar wnl. A.  Pregestational Diabetes  Elevated BMI  Back pain  P.   Chart reviewed [id number] DARINEL 10/19/17, Anne evans 5 d, Problem list updated [place]

## 2017-08-03 ENCOUNTER — TELEPHONE (OUTPATIENT)
Dept: PERINATAL CARE | Facility: HOSPITAL | Age: 32
End: 2017-08-03

## 2017-08-03 NOTE — TELEPHONE ENCOUNTER
Blood sugar log from 7/18/17- 8/1/17 obtained  Reviewed by Dr Damian Aparicio in  Insulin   Increase NPH to 16 units in am   Keep NPH at 22 U HS  Novolog increase to 8 u at Breakfast and 10 units with Dinner  lvmtcb to confirm new insulin RX

## 2017-08-07 ENCOUNTER — APPOINTMENT (OUTPATIENT)
Dept: PERINATAL CARE | Facility: HOSPITAL | Age: 32
End: 2017-08-07
Attending: OBSTETRICS & GYNECOLOGY
Payer: COMMERCIAL

## 2017-08-07 ENCOUNTER — HOSPITAL ENCOUNTER (OUTPATIENT)
Facility: HOSPITAL | Age: 32
Setting detail: OBSERVATION
Discharge: HOME | End: 2017-08-07
Attending: OBSTETRICS & GYNECOLOGY | Admitting: OBSTETRICS & GYNECOLOGY
Payer: COMMERCIAL

## 2017-08-07 ENCOUNTER — PATIENT MESSAGE (OUTPATIENT)
Dept: OBGYN CLINIC | Facility: CLINIC | Age: 32
End: 2017-08-07

## 2017-08-07 VITALS
WEIGHT: 224 LBS | HEIGHT: 64 IN | DIASTOLIC BLOOD PRESSURE: 75 MMHG | HEART RATE: 86 BPM | SYSTOLIC BLOOD PRESSURE: 116 MMHG | BODY MASS INDEX: 38.24 KG/M2

## 2017-08-07 DIAGNOSIS — O24.414 INSULIN CONTROLLED GESTATIONAL DIABETES MELLITUS (GDM) DURING PREGNANCY, ANTEPARTUM: Primary | ICD-10-CM

## 2017-08-07 DIAGNOSIS — O26.893 HEADACHE IN PREGNANCY, THIRD TRIMESTER: ICD-10-CM

## 2017-08-07 DIAGNOSIS — E66.01 MORBID OBESITY WITH BODY MASS INDEX (BMI) OF 40.0 OR HIGHER (HCC): ICD-10-CM

## 2017-08-07 DIAGNOSIS — R51.9 HEADACHE IN PREGNANCY, THIRD TRIMESTER: ICD-10-CM

## 2017-08-07 PROBLEM — O26.899 HEADACHE IN PREGNANCY: Status: ACTIVE | Noted: 2017-08-07

## 2017-08-07 PROBLEM — O26.899 HEADACHE IN PREGNANCY (HCC): Status: ACTIVE | Noted: 2017-08-07

## 2017-08-07 LAB
ALT SERPL-CCNC: 73 U/L (ref 14–54)
AST SERPL-CCNC: 31 U/L (ref 15–41)
BASOPHILS # BLD: 0 K/UL (ref 0–0.2)
BASOPHILS NFR BLD: 0 %
EOSINOPHIL # BLD: 0.1 K/UL (ref 0–0.7)
EOSINOPHIL NFR BLD: 1 %
ERYTHROCYTE [DISTWIDTH] IN BLOOD BY AUTOMATED COUNT: 13.2 % (ref 11–15)
HCT VFR BLD AUTO: 37.6 % (ref 35–48)
HGB BLD-MCNC: 12.6 G/DL (ref 12–16)
LYMPHOCYTES # BLD: 2.3 K/UL (ref 1–4)
LYMPHOCYTES NFR BLD: 17 %
MCH RBC QN AUTO: 31.4 PG (ref 27–32)
MCHC RBC AUTO-ENTMCNC: 33.6 G/DL (ref 32–37)
MCV RBC AUTO: 93.6 FL (ref 80–100)
MONOCYTES # BLD: 0.9 K/UL (ref 0–1)
MONOCYTES NFR BLD: 7 %
NEUTROPHILS # BLD AUTO: 10 K/UL (ref 1.8–7.7)
NEUTROPHILS NFR BLD: 75 %
PLATELET # BLD AUTO: 218 K/UL (ref 140–400)
PMV BLD AUTO: 10.8 FL (ref 7.4–10.3)
RBC # BLD AUTO: 4.02 M/UL (ref 3.7–5.4)
URATE SERPL-MCNC: 4.3 MG/DL (ref 2.1–7.4)
WBC # BLD AUTO: 13.3 K/UL (ref 4–11)

## 2017-08-07 PROCEDURE — 76811 OB US DETAILED SNGL FETUS: CPT | Performed by: OBSTETRICS & GYNECOLOGY

## 2017-08-07 PROCEDURE — 59025 FETAL NON-STRESS TEST: CPT | Performed by: OBSTETRICS & GYNECOLOGY

## 2017-08-07 RX ORDER — ACETAMINOPHEN 500 MG
TABLET ORAL
Status: COMPLETED
Start: 2017-08-07 | End: 2017-08-07

## 2017-08-07 RX ORDER — ACETAMINOPHEN 500 MG
500 TABLET ORAL EVERY 6 HOURS PRN
Status: DISCONTINUED | OUTPATIENT
Start: 2017-08-07 | End: 2017-08-07

## 2017-08-07 RX ORDER — ACETAMINOPHEN 500 MG
1000 TABLET ORAL EVERY 6 HOURS PRN
Status: DISCONTINUED | OUTPATIENT
Start: 2017-08-07 | End: 2017-08-07

## 2017-08-07 NOTE — CONSULTS
Cynthia Patient Status:  Observation    1985 MRN [id number]   Location [address] [facility] Attending Gallo Villarreal MD   Hosp Day # 0 PCP Letty Soto.  DO LARRY Li Prenatal Vit-Fe Fumarate-FA (PRENATAL VITAMINS PLUS) 27-1 MG Oral Tab Take 1 tablet by mouth daily.  Disp:  Rfl:  Taking       Allergies:  No Known Allergies     Past Medical History:  Past Medical History:   Diagnosis Date   • Anemia     2-3 months after structural abnormalities seen today  4. Type II diabetes  5. Morbid obesity  6. Headache with elevated ALT- rule out preeclampsia. Her blood pressures have been great and no other signs of preeclampsia. PLAN:  1. Give Tylenol for HA  2.  Start 24 hour

## 2017-08-07 NOTE — TELEPHONE ENCOUNTER
Headache yesterday afternoon, 7/10, has not taken anything, no swelling, yesterday had stomach pain, feels light head, no visual disturbances, feels hot flashes while sitting, while walking feels uneasy,  baby moving, per mlm to go to [facility], Patient informed

## 2017-08-07 NOTE — TELEPHONE ENCOUNTER
From: Jonathon Rivera  To: Jama Rodriguez MD  Sent: 8/7/2017 11:32 AM CDT  Subject: Other    Good morning, I wanted to reach out to someone to ask what could be the cause of my headaches that started yesterday evening and haven't gone away.  At times I

## 2017-08-07 NOTE — TRIAGE
[facility]      Triage Note    Oralia Paiz Patient Status:  Observation    1985 MRN [id number]   Location [address] Attending Omari Ko MD   Hosp Day # 0 PCP No Reese.  Adrian Espinosa No           Nonstress Test Interpretation: Appropriate for gestational age                      FHR Category: Category I           Additional Comments Comments: Strip has been viewed by Dr. Sourav Wei.  Pt home with 24hr urine collection container with instr

## 2017-08-09 ENCOUNTER — HOSPITAL ENCOUNTER (OUTPATIENT)
Facility: HOSPITAL | Age: 32
Discharge: HOME | End: 2017-08-09
Attending: OBSTETRICS & GYNECOLOGY | Admitting: OBSTETRICS & GYNECOLOGY
Payer: COMMERCIAL

## 2017-08-09 ENCOUNTER — APPOINTMENT (OUTPATIENT)
Dept: OBGYN CLINIC | Facility: HOSPITAL | Age: 32
End: 2017-08-09
Payer: COMMERCIAL

## 2017-08-09 ENCOUNTER — APPOINTMENT (OUTPATIENT)
Dept: LAB | Facility: HOSPITAL | Age: 32
End: 2017-08-09
Attending: OBSTETRICS & GYNECOLOGY
Payer: COMMERCIAL

## 2017-08-09 VITALS — SYSTOLIC BLOOD PRESSURE: 102 MMHG | HEART RATE: 114 BPM | DIASTOLIC BLOOD PRESSURE: 60 MMHG

## 2017-08-09 DIAGNOSIS — R51.9 HEADACHE IN PREGNANCY, THIRD TRIMESTER: ICD-10-CM

## 2017-08-09 DIAGNOSIS — O26.893 HEADACHE IN PREGNANCY, THIRD TRIMESTER: ICD-10-CM

## 2017-08-09 LAB
ALT SERPL-CCNC: 69 U/L (ref 14–54)
AST SERPL-CCNC: 31 U/L (ref 15–41)
BASOPHILS # BLD: 0 K/UL (ref 0–0.2)
BASOPHILS NFR BLD: 0 %
BILIRUB UR QL STRIP: NEGATIVE
CLARITY UR: CLEAR
EOSINOPHIL # BLD: 0.1 K/UL (ref 0–0.7)
EOSINOPHIL NFR BLD: 1 %
ERYTHROCYTE [DISTWIDTH] IN BLOOD BY AUTOMATED COUNT: 13.4 % (ref 11–15)
GLUCOSE UR STRIP-MCNC: NEGATIVE MG/DL
HCT VFR BLD AUTO: 36.9 % (ref 35–48)
HGB BLD-MCNC: 12.6 G/DL (ref 12–16)
HGB UR QL STRIP: NEGATIVE
KETONES UR STRIP-MCNC: NEGATIVE MG/DL
LYMPHOCYTES # BLD: 2.4 K/UL (ref 1–4)
LYMPHOCYTES NFR BLD: 18 %
MCH RBC QN AUTO: 31.7 PG (ref 27–32)
MCHC RBC AUTO-ENTMCNC: 34.1 G/DL (ref 32–37)
MCV RBC AUTO: 93 FL (ref 80–100)
MONOCYTES # BLD: 0.8 K/UL (ref 0–1)
MONOCYTES NFR BLD: 6 %
NEUTROPHILS # BLD AUTO: 10.1 K/UL (ref 1.8–7.7)
NEUTROPHILS NFR BLD: 75 %
NITRITE UR QL STRIP: NEGATIVE
PH UR STRIP: 7.5 [PH]
PLATELET # BLD AUTO: 203 K/UL (ref 140–400)
PMV BLD AUTO: 10.9 FL (ref 7.4–10.3)
PROT 24H UR-MRATE: 187.2 MG/24H (ref 0–150)
PROT UR STRIP-MCNC: 30 MG/DL
RBC # BLD AUTO: 3.97 M/UL (ref 3.7–5.4)
SP GR UR STRIP: 1.02
SPECIMEN VOL 24H UR: 2080 ML/24H
URATE SERPL-MCNC: 4 MG/DL (ref 2.1–7.4)
UROBILINOGEN UR STRIP-ACNC: <2 MG/DL
WBC # BLD AUTO: 13.3 K/UL (ref 4–11)

## 2017-08-09 PROCEDURE — 59025 FETAL NON-STRESS TEST: CPT | Performed by: OBSTETRICS & GYNECOLOGY

## 2017-08-09 PROCEDURE — 85025 COMPLETE CBC W/AUTO DIFF WBC: CPT | Performed by: OBSTETRICS & GYNECOLOGY

## 2017-08-09 PROCEDURE — 84550 ASSAY OF BLOOD/URIC ACID: CPT | Performed by: OBSTETRICS & GYNECOLOGY

## 2017-08-09 PROCEDURE — 84450 TRANSFERASE (AST) (SGOT): CPT | Performed by: OBSTETRICS & GYNECOLOGY

## 2017-08-09 PROCEDURE — 36415 COLL VENOUS BLD VENIPUNCTURE: CPT

## 2017-08-09 PROCEDURE — 84156 ASSAY OF PROTEIN URINE: CPT

## 2017-08-09 PROCEDURE — 84460 ALANINE AMINO (ALT) (SGPT): CPT | Performed by: OBSTETRICS & GYNECOLOGY

## 2017-08-09 NOTE — NST
Nonstress Test   Patient: Jesus Mejia    Gestation: 29w6d    NST:  SCHEDULED NST AS FOLLOW UP FROM DIZZY SPELL TWO DAYS AGO.   ALSO A2GDM       Variability: Moderate           Accelerations: Yes           Decelerations: None            Baseline: 140 BP

## 2017-08-15 ENCOUNTER — OFFICE VISIT (OUTPATIENT)
Dept: OBGYN CLINIC | Facility: CLINIC | Age: 32
End: 2017-08-15

## 2017-08-15 VITALS — WEIGHT: 226 LBS | SYSTOLIC BLOOD PRESSURE: 127 MMHG | BODY MASS INDEX: 39 KG/M2 | DIASTOLIC BLOOD PRESSURE: 81 MMHG

## 2017-08-15 DIAGNOSIS — Z34.92 NORMAL PREGNANCY, SECOND TRIMESTER: Primary | ICD-10-CM

## 2017-08-15 LAB
APPEARANCE: CLEAR
MULTISTIX LOT#: NORMAL NUMERIC
PH, URINE: 6 (ref 4.5–8)
SPECIFIC GRAVITY: 1.01 (ref 1–1.03)
URINE-COLOR: YELLOW
UROBILINOGEN,SEMI-QN: 0 MG/DL (ref 0–1.9)

## 2017-08-16 ENCOUNTER — APPOINTMENT (OUTPATIENT)
Dept: LAB | Facility: HOSPITAL | Age: 32
End: 2017-08-16
Attending: OBSTETRICS & GYNECOLOGY
Payer: COMMERCIAL

## 2017-08-16 ENCOUNTER — TELEPHONE (OUTPATIENT)
Dept: OBGYN CLINIC | Facility: CLINIC | Age: 32
End: 2017-08-16

## 2017-08-16 DIAGNOSIS — Z34.83 ENCOUNTER FOR SUPERVISION OF OTHER NORMAL PREGNANCY IN THIRD TRIMESTER: ICD-10-CM

## 2017-08-16 DIAGNOSIS — Z34.83 ENCOUNTER FOR SUPERVISION OF OTHER NORMAL PREGNANCY IN THIRD TRIMESTER: Primary | ICD-10-CM

## 2017-08-16 LAB
ALBUMIN SERPL BCP-MCNC: 3 G/DL (ref 3.5–4.8)
ALP SERPL-CCNC: 82 U/L (ref 32–100)
ALT SERPL-CCNC: 55 U/L (ref 14–54)
ALT SERPL-CCNC: 55 U/L (ref 14–54)
AST SERPL-CCNC: 29 U/L (ref 15–41)
AST SERPL-CCNC: 29 U/L (ref 15–41)
BILIRUB DIRECT SERPL-MCNC: 0.1 MG/DL (ref 0–0.2)
BILIRUB SERPL-MCNC: 0.6 MG/DL (ref 0.3–1.2)
PROT SERPL-MCNC: 6.7 G/DL (ref 5.9–8.4)

## 2017-08-16 PROCEDURE — 80076 HEPATIC FUNCTION PANEL: CPT

## 2017-08-16 PROCEDURE — 36415 COLL VENOUS BLD VENIPUNCTURE: CPT

## 2017-08-16 PROCEDURE — 84450 TRANSFERASE (AST) (SGOT): CPT

## 2017-08-16 PROCEDURE — 84460 ALANINE AMINO (ALT) (SGPT): CPT

## 2017-08-16 NOTE — TELEPHONE ENCOUNTER
Orders placed in Epic for asl/alt and lft. Attempted to call pt, but no anser. Left message on voicemail to call the office asap.

## 2017-08-16 NOTE — TELEPHONE ENCOUNTER
Pt had elevated lft 8/7, trended down 8/9, 24 hr urine protein was 187, s/p consult with dr Donn Cox. D/w dr Milvia Carballo, on call, and will repeat lft today. Pt was doing well in office visit yesterday. Tasked to staff.   Order ast /alt, pt to go to lab today

## 2017-08-16 NOTE — TELEPHONE ENCOUNTER
Pt returned call and informed that she needs to go to the lab today and have her asl/alt and lft drawn. Pt voices understanding. Pt voices she will be able to have the labs drawn by 2 pm today.

## 2017-08-18 ENCOUNTER — TELEPHONE (OUTPATIENT)
Dept: OBGYN CLINIC | Facility: CLINIC | Age: 32
End: 2017-08-18

## 2017-08-23 ENCOUNTER — HOSPITAL ENCOUNTER (OUTPATIENT)
Dept: PERINATAL CARE | Facility: HOSPITAL | Age: 32
Discharge: HOME | End: 2017-08-23
Attending: OBSTETRICS & GYNECOLOGY
Payer: COMMERCIAL

## 2017-08-23 ENCOUNTER — APPOINTMENT (OUTPATIENT)
Dept: LAB | Facility: HOSPITAL | Age: 32
End: 2017-08-23
Attending: OBSTETRICS & GYNECOLOGY
Payer: COMMERCIAL

## 2017-08-23 ENCOUNTER — ROUTINE PRENATAL (OUTPATIENT)
Dept: OBGYN CLINIC | Facility: CLINIC | Age: 32
End: 2017-08-23

## 2017-08-23 VITALS
HEART RATE: 81 BPM | DIASTOLIC BLOOD PRESSURE: 72 MMHG | WEIGHT: 225.38 LBS | SYSTOLIC BLOOD PRESSURE: 103 MMHG | BODY MASS INDEX: 39 KG/M2

## 2017-08-23 VITALS — HEART RATE: 89 BPM | SYSTOLIC BLOOD PRESSURE: 115 MMHG | DIASTOLIC BLOOD PRESSURE: 76 MMHG

## 2017-08-23 DIAGNOSIS — O24.414 INSULIN CONTROLLED GESTATIONAL DIABETES MELLITUS (GDM) DURING PREGNANCY, ANTEPARTUM: ICD-10-CM

## 2017-08-23 DIAGNOSIS — O24.414 INSULIN CONTROLLED GESTATIONAL DIABETES MELLITUS (GDM) DURING PREGNANCY, ANTEPARTUM: Primary | ICD-10-CM

## 2017-08-23 DIAGNOSIS — E66.01 MORBID OBESITY WITH BODY MASS INDEX (BMI) OF 40.0 OR HIGHER (HCC): ICD-10-CM

## 2017-08-23 DIAGNOSIS — R79.89 ELEVATED LFTS: ICD-10-CM

## 2017-08-23 DIAGNOSIS — Z34.83 ENCOUNTER FOR SUPERVISION OF OTHER NORMAL PREGNANCY IN THIRD TRIMESTER: Primary | ICD-10-CM

## 2017-08-23 DIAGNOSIS — O24.319 PREGESTATIONAL DIABETES MELLITUS, MODIFIED WHITE CLASS B: ICD-10-CM

## 2017-08-23 LAB
ALBUMIN SERPL BCP-MCNC: 3 G/DL (ref 3.5–4.8)
ALP SERPL-CCNC: 86 U/L (ref 32–100)
ALT SERPL-CCNC: 53 U/L (ref 14–54)
AST SERPL-CCNC: 39 U/L (ref 15–41)
BILIRUB DIRECT SERPL-MCNC: 0.4 MG/DL (ref 0–0.2)
BILIRUB SERPL-MCNC: 1.1 MG/DL (ref 0.3–1.2)
MULTISTIX LOT#: NORMAL NUMERIC
PH, URINE: 6.5 (ref 4.5–8)
PROT SERPL-MCNC: 6.6 G/DL (ref 5.9–8.4)
SPECIFIC GRAVITY: 1.01 (ref 1–1.03)
UROBILINOGEN,SEMI-QN: 0.2 MG/DL (ref 0–1.9)

## 2017-08-23 PROCEDURE — 76819 FETAL BIOPHYS PROFIL W/O NST: CPT | Performed by: OBSTETRICS & GYNECOLOGY

## 2017-08-23 PROCEDURE — 36415 COLL VENOUS BLD VENIPUNCTURE: CPT

## 2017-08-23 PROCEDURE — 76805 OB US >/= 14 WKS SNGL FETUS: CPT | Performed by: OBSTETRICS & GYNECOLOGY

## 2017-08-23 PROCEDURE — 80076 HEPATIC FUNCTION PANEL: CPT

## 2017-08-23 PROCEDURE — 99214 OFFICE O/P EST MOD 30 MIN: CPT | Performed by: OBSTETRICS & GYNECOLOGY

## 2017-08-23 NOTE — ADDENDUM NOTE
Encounter addended by: Nida Tinsley on: 8/23/2017  5:03 PM<BR>    Actions taken: Charge Capture section accepted

## 2017-08-23 NOTE — PROGRESS NOTES
Outpatient Maternal-Fetal Medicine Consultation     Dear Dr. Tracie Herrera,     Thank you for requesting ultrasound evaluation and maternal fetal medicine consultation on your patient Kelvin George As you are aware she is a 27year old female with a Single ultrasounds and antepartum testing.     OB ULTRASOUND REPORT      See imaging tab for complete consultation / ultrasound report or in PACS     Fetal Heart Rate: Present 138 bpm  Fetal Presentation: Cephalic  Amniotic fluid MVP: WNL  Cord: 3 vessel cord  Pl

## 2017-08-23 NOTE — PROGRESS NOTES
Pt for growth U/S  Hx A2gdm and obesity  Denies pregnancy complaints  States active fetus  bs log obt for MD to reciew

## 2017-08-24 ENCOUNTER — TELEPHONE (OUTPATIENT)
Dept: OBGYN CLINIC | Facility: CLINIC | Age: 32
End: 2017-08-24

## 2017-08-24 ENCOUNTER — APPOINTMENT (OUTPATIENT)
Dept: OBGYN CLINIC | Facility: HOSPITAL | Age: 32
End: 2017-08-24
Payer: COMMERCIAL

## 2017-08-24 ENCOUNTER — HOSPITAL ENCOUNTER (OUTPATIENT)
Facility: HOSPITAL | Age: 32
Discharge: HOME | End: 2017-08-24
Attending: OBSTETRICS & GYNECOLOGY | Admitting: OBSTETRICS & GYNECOLOGY
Payer: COMMERCIAL

## 2017-08-24 VITALS — DIASTOLIC BLOOD PRESSURE: 64 MMHG | HEART RATE: 105 BPM | SYSTOLIC BLOOD PRESSURE: 113 MMHG

## 2017-08-24 PROBLEM — O24.319 MODIFIED WHITE CLASS B PREGESTATIONAL DIABETES MELLITUS (HCC): Status: ACTIVE | Noted: 2017-04-07

## 2017-08-24 PROBLEM — O24.319 MODIFIED WHITE CLASS B PREGESTATIONAL DIABETES MELLITUS: Status: ACTIVE | Noted: 2017-04-07

## 2017-08-24 PROCEDURE — 59025 FETAL NON-STRESS TEST: CPT | Performed by: OBSTETRICS & GYNECOLOGY

## 2017-08-24 NOTE — TELEPHONE ENCOUNTER
Order for breast pump faxed, fax confirmation received. Spoke with pt and informed breast pump order was faxed and she can come in to  script. Pt agreed and voiced understanding.

## 2017-08-24 NOTE — TELEPHONE ENCOUNTER
PER PT STATE SHE WAS SEEN IN THE OFFICE YESTERDAY / PT STATE SHE LEFT THE SCRIPT / A PRINT OUT OF HER INSURANCE INFO  / FOR AN BREAST PUMP / PT STATE SHE ASK THE NURSE TO FAX THAT INFO / PT STATE SHE LEFT / PT WANT TO KNOW IF SHE CAN COME  THE SCRIP

## 2017-08-29 ENCOUNTER — TELEPHONE (OUTPATIENT)
Dept: OBGYN CLINIC | Facility: CLINIC | Age: 32
End: 2017-08-29

## 2017-08-29 DIAGNOSIS — R82.998 URINE LEUKOCYTES: Primary | ICD-10-CM

## 2017-08-29 NOTE — TELEPHONE ENCOUNTER
Spoke with pt and informed she needs to return to lab for urine culture. Pt agreed and voiced understanding.

## 2017-08-30 ENCOUNTER — APPOINTMENT (OUTPATIENT)
Dept: LAB | Facility: HOSPITAL | Age: 32
End: 2017-08-30
Attending: OBSTETRICS & GYNECOLOGY
Payer: COMMERCIAL

## 2017-08-30 DIAGNOSIS — R82.998 URINE LEUKOCYTES: ICD-10-CM

## 2017-08-30 PROCEDURE — 87086 URINE CULTURE/COLONY COUNT: CPT

## 2017-08-31 ENCOUNTER — HOSPITAL ENCOUNTER (OUTPATIENT)
Facility: HOSPITAL | Age: 32
Discharge: HOME | End: 2017-08-31
Attending: OBSTETRICS & GYNECOLOGY | Admitting: OBSTETRICS & GYNECOLOGY
Payer: COMMERCIAL

## 2017-08-31 ENCOUNTER — APPOINTMENT (OUTPATIENT)
Dept: OBGYN CLINIC | Facility: HOSPITAL | Age: 32
End: 2017-08-31
Payer: COMMERCIAL

## 2017-08-31 PROCEDURE — 59025 FETAL NON-STRESS TEST: CPT | Performed by: OBSTETRICS & GYNECOLOGY

## 2017-08-31 NOTE — NST
Nonstress Test   Patient: Ja Ceron    Gestation: 33w0d    NST:A2GDM, HIGH BMI       Variability: Moderate           Accelerations: Yes           Decelerations: None            Baseline: 140 BPM           Uterine Irritability: No           Contracti

## 2017-09-02 ENCOUNTER — OFFICE VISIT (OUTPATIENT)
Dept: DERMATOLOGY CLINIC | Facility: CLINIC | Age: 32
End: 2017-09-02

## 2017-09-02 DIAGNOSIS — L83 ACQUIRED ACANTHOSIS NIGRICANS: ICD-10-CM

## 2017-09-02 DIAGNOSIS — L71.9 ROSACEA: Primary | ICD-10-CM

## 2017-09-02 DIAGNOSIS — B36.0 TINEA VERSICOLOR: ICD-10-CM

## 2017-09-02 PROCEDURE — 99212 OFFICE O/P EST SF 10 MIN: CPT | Performed by: DERMATOLOGY

## 2017-09-02 PROCEDURE — 99202 OFFICE O/P NEW SF 15 MIN: CPT | Performed by: DERMATOLOGY

## 2017-09-02 RX ORDER — HUMAN INSULIN 100 [IU]/ML
INJECTION, SUSPENSION SUBCUTANEOUS
Qty: 10 ML | Refills: 0 | Status: CANCELLED | OUTPATIENT
Start: 2017-09-02

## 2017-09-02 RX ORDER — AZELAIC ACID 0.15 G/G
GEL TOPICAL
Qty: 1 G | Refills: 3 | Status: ON HOLD | OUTPATIENT
Start: 2017-09-02 | End: 2018-03-23

## 2017-09-02 RX ORDER — AMMONIUM LACTATE 12 G/100G
1 LOTION TOPICAL 2 TIMES DAILY
Qty: 500 G | Refills: 11 | Status: SHIPPED | OUTPATIENT
Start: 2017-09-02 | End: 2017-10-02

## 2017-09-02 RX ORDER — KETOCONAZOLE 20 MG/G
CREAM TOPICAL
Qty: 30 G | Refills: 3 | Status: ON HOLD | OUTPATIENT
Start: 2017-09-02 | End: 2018-03-23

## 2017-09-02 NOTE — PROGRESS NOTES
Past Medical History:   Diagnosis Date   • Anemia     2-3 months after D&C   • Gestational diabetes 04/2017   • Obesity, Class III, BMI 40-49.9 (morbid obesity) ([facility]     Past Surgical History:  2003: CHOLECYSTECTOMY  04/29/2016: D & C    Social Histo

## 2017-09-03 RX ORDER — HUMAN INSULIN 100 [IU]/ML
INJECTION, SUSPENSION SUBCUTANEOUS
Qty: 10 ML | Refills: 0 | Status: CANCELLED | OUTPATIENT
Start: 2017-09-03

## 2017-09-05 ENCOUNTER — PATIENT MESSAGE (OUTPATIENT)
Dept: DERMATOLOGY CLINIC | Facility: CLINIC | Age: 32
End: 2017-09-05

## 2017-09-05 DIAGNOSIS — Z79.4 DIABETES MELLITUS, TYPE II, INSULIN DEPENDENT (HCC): ICD-10-CM

## 2017-09-05 DIAGNOSIS — E11.9 DIABETES MELLITUS, TYPE II, INSULIN DEPENDENT (HCC): ICD-10-CM

## 2017-09-05 RX ORDER — INSULIN ASPART 100 [IU]/ML
10 INJECTION, SOLUTION INTRAVENOUS; SUBCUTANEOUS 2 TIMES DAILY
Qty: 1 VIAL | Refills: 2 | Status: SHIPPED | OUTPATIENT
Start: 2017-09-05 | End: 2017-10-07

## 2017-09-05 NOTE — TELEPHONE ENCOUNTER
From: Walt Julian  To: Joanna Chang MD  Sent: 9/5/2017 7:51 AM CDT  Subject: Prescription Question    Good morning, I went to my pharmacy and was given 2 rx.  I wanted to ask if someone can explain exactly what medication I am to use on what area a

## 2017-09-05 NOTE — TELEPHONE ENCOUNTER
LOV 9/2/17, pt was RXed ketoconazole cream, finacea and ammonium lactate.  Please advise on where she should use ketoconazole and ammonium lactate

## 2017-09-07 ENCOUNTER — APPOINTMENT (OUTPATIENT)
Dept: OBGYN CLINIC | Facility: HOSPITAL | Age: 32
End: 2017-09-07
Payer: COMMERCIAL

## 2017-09-07 ENCOUNTER — HOSPITAL ENCOUNTER (OUTPATIENT)
Facility: HOSPITAL | Age: 32
Discharge: HOME | End: 2017-09-07
Attending: OBSTETRICS & GYNECOLOGY | Admitting: OBSTETRICS & GYNECOLOGY
Payer: COMMERCIAL

## 2017-09-07 VITALS — SYSTOLIC BLOOD PRESSURE: 112 MMHG | HEART RATE: 108 BPM | DIASTOLIC BLOOD PRESSURE: 60 MMHG

## 2017-09-07 PROCEDURE — 59025 FETAL NON-STRESS TEST: CPT

## 2017-09-07 NOTE — NST
Nonstress Test   Patient: Lincoln Yeager    Gestation: 34w0d    NST:a2gdm, high BMI       Variability: Moderate           Accelerations: Yes           Decelerations: None            Baseline: 146 BPM           Uterine Irritability: No           Contracti

## 2017-09-07 NOTE — TELEPHONE ENCOUNTER
Please contact pt--there were 3 prescriptions, so I do not know which 2 they dispensed. The finacea was for the face--for acne. The ketoconazole  Was for the rash between the breasts and around the neck.  The lactic acid lotion was for the neck, darker dr

## 2017-09-08 ENCOUNTER — ROUTINE PRENATAL (OUTPATIENT)
Dept: OBGYN CLINIC | Facility: CLINIC | Age: 32
End: 2017-09-08

## 2017-09-08 VITALS — DIASTOLIC BLOOD PRESSURE: 72 MMHG | SYSTOLIC BLOOD PRESSURE: 124 MMHG | WEIGHT: 229.38 LBS | BODY MASS INDEX: 39 KG/M2

## 2017-09-08 DIAGNOSIS — Z34.83 ENCOUNTER FOR SUPERVISION OF OTHER NORMAL PREGNANCY IN THIRD TRIMESTER: Primary | ICD-10-CM

## 2017-09-08 LAB
APPEARANCE: CLEAR
MULTISTIX LOT#: NORMAL NUMERIC
PH, URINE: 6 (ref 4.5–8)
SPECIFIC GRAVITY: 1.01 (ref 1–1.03)
URINE-COLOR: YELLOW
UROBILINOGEN,SEMI-QN: 0.2 MG/DL (ref 0–1.9)

## 2017-09-08 PROCEDURE — 81002 URINALYSIS NONAUTO W/O SCOPE: CPT | Performed by: OBSTETRICS & GYNECOLOGY

## 2017-09-08 NOTE — PROGRESS NOTES
BS WNL. On Insulin. Starting Twice weekly NSTs on Monday. To do labs next week. Has F/U Growth U/S x 2 weeks with MFM.   IOL @ 38 weeks if not delivered

## 2017-09-11 ENCOUNTER — HOSPITAL ENCOUNTER (OUTPATIENT)
Dept: OBGYN CLINIC | Facility: HOSPITAL | Age: 32
Discharge: HOME | End: 2017-09-11
Payer: COMMERCIAL

## 2017-09-11 ENCOUNTER — HOSPITAL ENCOUNTER (OUTPATIENT)
Facility: HOSPITAL | Age: 32
Discharge: HOME | End: 2017-09-11
Attending: OBSTETRICS & GYNECOLOGY | Admitting: OBSTETRICS & GYNECOLOGY
Payer: COMMERCIAL

## 2017-09-11 VITALS — RESPIRATION RATE: 17 BRPM | HEART RATE: 97 BPM | DIASTOLIC BLOOD PRESSURE: 71 MMHG | SYSTOLIC BLOOD PRESSURE: 110 MMHG

## 2017-09-11 PROCEDURE — 59025 FETAL NON-STRESS TEST: CPT | Performed by: OBSTETRICS & GYNECOLOGY

## 2017-09-11 NOTE — PROGRESS NOTES
Shena Stovall is a 34year old female. Patient presents with:  Rosacea: ? ??? facial rash  1st noted past 6 mos is 8 mos pregnant   Derm Problem: patches of darker skin neck X mos            Review of patient's allergies indicates no known aller (morbid obesity) ([facility]      Social History:  Smoking status: Former Smoker                                                              Packs/day: 0.10      Years: 1.00         Types: Cigarettes     Quit date: 5/17/2014  Smokeless tobacco: Never Used Diagnosis Date   • Anemia     2-3 months after D&C   • Gestational diabetes 04/2017   • Obesity, Class III, BMI 40-49.9 (morbid obesity) ([facility] 2017     Past Surgical History:  2003: CHOLECYSTECTOMY  04/29/2016: D & C    Social History  Social History   M chest, abdomen, arms, legs, palms. Remarkable for lesions as noted   10 pink scaling patches at central chest, lateral anterior neck. Velvety hyperkeratotic plaques at lateral anterior and posterior neck. Erythema background with papules nodules.   He na times daily. Azelaic Acid (FINACEA) 15 % External Gel 1 g 3      Sig: Use qd for rosacea/acne      ammonium lactate 12 % External Lotion 500 g 11      Sig: Apply 1 Application topically 2 (two) times daily.            Rosacea  (primary encounter diagno

## 2017-09-11 NOTE — NST
Nonstress Test   Patient: Glorious Hinders    Gestation: 34w4d    NST:A2GDM, HIGH BMI       Variability: Moderate           Accelerations: Yes           Decelerations: None            Baseline: 135 BPM           Uterine Irritability: No           Contracti

## 2017-09-14 ENCOUNTER — OFFICE VISIT (OUTPATIENT)
Dept: FAMILY MEDICINE CLINIC | Facility: CLINIC | Age: 32
End: 2017-09-14

## 2017-09-14 VITALS
WEIGHT: 227 LBS | HEART RATE: 102 BPM | DIASTOLIC BLOOD PRESSURE: 83 MMHG | BODY MASS INDEX: 39 KG/M2 | TEMPERATURE: 99 F | SYSTOLIC BLOOD PRESSURE: 130 MMHG

## 2017-09-14 DIAGNOSIS — J06.9 VIRAL UPPER RESPIRATORY TRACT INFECTION: Primary | ICD-10-CM

## 2017-09-14 LAB
CONTROL LINE PRESENT WITH A CLEAR BACKGROUND (YES/NO): YES YES/NO
KIT LOT #: NORMAL NUMERIC
STREP GRP A CUL-SCR: NEGATIVE

## 2017-09-14 PROCEDURE — 99213 OFFICE O/P EST LOW 20 MIN: CPT | Performed by: FAMILY MEDICINE

## 2017-09-14 PROCEDURE — 99212 OFFICE O/P EST SF 10 MIN: CPT | Performed by: FAMILY MEDICINE

## 2017-09-14 PROCEDURE — 87880 STREP A ASSAY W/OPTIC: CPT | Performed by: FAMILY MEDICINE

## 2017-09-14 NOTE — PROGRESS NOTES
Blood pressure 130/83, pulse 102, temperature 99.1 °F (37.3 °C), temperature source Oral, weight 227 lb (103 kg), last menstrual period 01/12/2017, not currently breastfeeding.     Patient presents today complaining of a 3 day history of bilateral otalgia w

## 2017-09-18 ENCOUNTER — HOSPITAL ENCOUNTER (OUTPATIENT)
Facility: HOSPITAL | Age: 32
Discharge: HOME | End: 2017-09-18
Attending: OBSTETRICS & GYNECOLOGY | Admitting: OBSTETRICS & GYNECOLOGY
Payer: COMMERCIAL

## 2017-09-18 ENCOUNTER — APPOINTMENT (OUTPATIENT)
Dept: OBGYN CLINIC | Facility: HOSPITAL | Age: 32
End: 2017-09-18
Payer: COMMERCIAL

## 2017-09-18 ENCOUNTER — HOSPITAL ENCOUNTER (OUTPATIENT)
Facility: HOSPITAL | Age: 32
Setting detail: OBSERVATION
Discharge: HOME | End: 2017-09-18
Attending: OBSTETRICS & GYNECOLOGY | Admitting: OBSTETRICS & GYNECOLOGY
Payer: COMMERCIAL

## 2017-09-18 VITALS — SYSTOLIC BLOOD PRESSURE: 116 MMHG | HEART RATE: 79 BPM | DIASTOLIC BLOOD PRESSURE: 49 MMHG

## 2017-09-18 VITALS — DIASTOLIC BLOOD PRESSURE: 69 MMHG | TEMPERATURE: 98 F | SYSTOLIC BLOOD PRESSURE: 111 MMHG | HEART RATE: 89 BPM

## 2017-09-18 PROBLEM — N92.0 SPOTTING: Status: ACTIVE | Noted: 2017-09-18

## 2017-09-18 LAB
ALT SERPL-CCNC: 33 U/L (ref 14–54)
AST SERPL-CCNC: 24 U/L (ref 15–41)
BASOPHILS # BLD: 0 K/UL (ref 0–0.2)
BASOPHILS NFR BLD: 0 %
CREAT UR-MCNC: 135.7 MG/DL
EOSINOPHIL # BLD: 0.2 K/UL (ref 0–0.7)
EOSINOPHIL NFR BLD: 1 %
ERYTHROCYTE [DISTWIDTH] IN BLOOD BY AUTOMATED COUNT: 13.7 % (ref 11–15)
HCT VFR BLD AUTO: 38 % (ref 35–48)
HGB BLD-MCNC: 13.2 G/DL (ref 12–16)
LYMPHOCYTES # BLD: 2 K/UL (ref 1–4)
LYMPHOCYTES NFR BLD: 16 %
MCH RBC QN AUTO: 32.3 PG (ref 27–32)
MCHC RBC AUTO-ENTMCNC: 34.7 G/DL (ref 32–37)
MCV RBC AUTO: 93 FL (ref 80–100)
MONOCYTES # BLD: 0.9 K/UL (ref 0–1)
MONOCYTES NFR BLD: 7 %
NEUTROPHILS # BLD AUTO: 9.5 K/UL (ref 1.8–7.7)
NEUTROPHILS NFR BLD: 75 %
PLATELET # BLD AUTO: 207 K/UL (ref 140–400)
PMV BLD AUTO: 10.8 FL (ref 7.4–10.3)
PROT UR-MCNC: 21 MG/DL
RBC # BLD AUTO: 4.08 M/UL (ref 3.7–5.4)
URATE SERPL-MCNC: 5.4 MG/DL (ref 2.1–7.4)
URINE PROTEIN/CREATININE RATIO, RANDOM, OB: 0.15
WBC # BLD AUTO: 12.6 K/UL (ref 4–11)

## 2017-09-18 NOTE — NST
Nonstress Test   Patient: Jonathon Rivera    Gestation: 35w4d    NST: Reactive       Variability: Moderate           Accelerations: Yes           Decelerations: None            Baseline: 140 BPM           Uterine Irritability: No           Contractions: N

## 2017-09-19 NOTE — PROGRESS NOTES
Outpatient Maternal-Fetal Medicine Consultation     Dear Dr. Lisa Moore,     Thank you for requesting ultrasound evaluation and maternal fetal medicine consultation on your patient Ester Infante As you are aware she is a 27year old female with a Single breakfast and 10 units at dinner     Continued medication use and capillary blood glucose assessments were reviewed as well as recommendations for serial growth ultrasounds and antepartum testing.     OB ULTRASOUND REPORT      See imaging tab for complete Our discussion is summarized above.  The approximate physician face-to-face time was 25 minutes.

## 2017-09-19 NOTE — TRIAGE
[facility]      Triage Note    Gail Boyer Patient Status:  Observation    1985 MRN [id number]   Location [address]  Attending Jade Shirley MD   [facility] Day # 0 PCP Rupinder March.  DO Jen       Gr Variability: Moderate           Accelerations: Yes           Decelerations: Variable            Baseline: 135 BPM           Uterine Irritability: Yes           Contractions: Irregular                       Acoustic Stimulator: No           Nonstress Test I

## 2017-09-20 ENCOUNTER — OFFICE VISIT (OUTPATIENT)
Dept: OBGYN CLINIC | Facility: CLINIC | Age: 32
End: 2017-09-20

## 2017-09-20 ENCOUNTER — HOSPITAL ENCOUNTER (OUTPATIENT)
Dept: PERINATAL CARE | Facility: HOSPITAL | Age: 32
Discharge: HOME | End: 2017-09-20
Attending: OBSTETRICS & GYNECOLOGY
Payer: COMMERCIAL

## 2017-09-20 VITALS
SYSTOLIC BLOOD PRESSURE: 120 MMHG | BODY MASS INDEX: 40 KG/M2 | HEART RATE: 82 BPM | DIASTOLIC BLOOD PRESSURE: 74 MMHG | WEIGHT: 233.63 LBS

## 2017-09-20 VITALS — HEART RATE: 103 BPM | SYSTOLIC BLOOD PRESSURE: 121 MMHG | DIASTOLIC BLOOD PRESSURE: 81 MMHG

## 2017-09-20 DIAGNOSIS — Z34.83 ENCOUNTER FOR SUPERVISION OF OTHER NORMAL PREGNANCY IN THIRD TRIMESTER: Primary | ICD-10-CM

## 2017-09-20 DIAGNOSIS — E66.01 MORBID OBESITY WITH BODY MASS INDEX (BMI) OF 40.0 OR HIGHER (HCC): Primary | ICD-10-CM

## 2017-09-20 DIAGNOSIS — O24.414 INSULIN CONTROLLED GESTATIONAL DIABETES MELLITUS (GDM) DURING PREGNANCY, ANTEPARTUM: ICD-10-CM

## 2017-09-20 DIAGNOSIS — E66.01 MORBID OBESITY WITH BODY MASS INDEX (BMI) OF 40.0 OR HIGHER (HCC): ICD-10-CM

## 2017-09-20 LAB
MULTISTIX LOT#: NORMAL NUMERIC
PH, URINE: 6 (ref 4.5–8)
SPECIFIC GRAVITY: 1.01 (ref 1–1.03)
UROBILINOGEN,SEMI-QN: 0.2 MG/DL (ref 0–1.9)

## 2017-09-20 PROCEDURE — 76819 FETAL BIOPHYS PROFIL W/O NST: CPT | Performed by: OBSTETRICS & GYNECOLOGY

## 2017-09-20 PROCEDURE — 76805 OB US >/= 14 WKS SNGL FETUS: CPT | Performed by: OBSTETRICS & GYNECOLOGY

## 2017-09-20 PROCEDURE — 99213 OFFICE O/P EST LOW 20 MIN: CPT | Performed by: OBSTETRICS & GYNECOLOGY

## 2017-09-20 NOTE — PROGRESS NOTES
Pt for growth U/S  Hx pregest DM , Obesity   Denies pregnancy complaints  States active fetus      Sexual activity: Not on file

## 2017-09-20 NOTE — PROGRESS NOTES
Reports good fetal movements. Denies contractions or leaking fluid. Had scant amount of spotting the day after sexual relations earlier this week. Advised against continued sexual relations.   Labor and rupture of membrane precautions  Had MFM growth sca

## 2017-09-20 NOTE — ADDENDUM NOTE
Encounter addended by: Christiano Abbasi on: 9/20/2017  2:45 PM<BR>    Actions taken: Charge Capture section accepted

## 2017-09-21 ENCOUNTER — APPOINTMENT (OUTPATIENT)
Dept: OBGYN CLINIC | Facility: HOSPITAL | Age: 32
End: 2017-09-21
Payer: COMMERCIAL

## 2017-09-21 ENCOUNTER — HOSPITAL ENCOUNTER (OUTPATIENT)
Facility: HOSPITAL | Age: 32
Discharge: HOME | End: 2017-09-21
Attending: OBSTETRICS & GYNECOLOGY | Admitting: OBSTETRICS & GYNECOLOGY
Payer: COMMERCIAL

## 2017-09-21 PROCEDURE — 59025 FETAL NON-STRESS TEST: CPT | Performed by: OBSTETRICS & GYNECOLOGY

## 2017-09-21 NOTE — NST
Nonstress Test   Patient: Lincoln Yeager    Gestation: 36w0d    NST:pregestional diabetes, high BMI       Variability: Moderate           Accelerations: Yes           Decelerations: None            Baseline: 140 BPM           Uterine Irritability: No

## 2017-09-25 ENCOUNTER — HOSPITAL ENCOUNTER (OUTPATIENT)
Facility: HOSPITAL | Age: 32
Discharge: HOME | End: 2017-09-25
Attending: OBSTETRICS & GYNECOLOGY | Admitting: OBSTETRICS & GYNECOLOGY
Payer: COMMERCIAL

## 2017-09-25 ENCOUNTER — APPOINTMENT (OUTPATIENT)
Dept: OBGYN CLINIC | Facility: HOSPITAL | Age: 32
End: 2017-09-25
Payer: COMMERCIAL

## 2017-09-25 VITALS — TEMPERATURE: 98 F | HEART RATE: 96 BPM | DIASTOLIC BLOOD PRESSURE: 64 MMHG | SYSTOLIC BLOOD PRESSURE: 119 MMHG

## 2017-09-25 PROCEDURE — 59025 FETAL NON-STRESS TEST: CPT | Performed by: OBSTETRICS & GYNECOLOGY

## 2017-09-25 NOTE — NST
Nonstress Test   Patient: Helen Fontanez    Gestation: 36w4d    NST:       Variability: Moderate           Accelerations: Yes           Decelerations: None            Baseline: 145 BPM           Uterine Irritability: No           Contractions: Not presen

## 2017-09-26 ENCOUNTER — LAB ENCOUNTER (OUTPATIENT)
Dept: LAB | Facility: HOSPITAL | Age: 32
End: 2017-09-26
Attending: OBSTETRICS & GYNECOLOGY
Payer: COMMERCIAL

## 2017-09-26 DIAGNOSIS — Z34.83 ENCOUNTER FOR SUPERVISION OF OTHER NORMAL PREGNANCY IN THIRD TRIMESTER: ICD-10-CM

## 2017-09-26 LAB
BASOPHILS # BLD: 0 K/UL (ref 0–0.2)
BASOPHILS NFR BLD: 0 %
EOSINOPHIL # BLD: 0.1 K/UL (ref 0–0.7)
EOSINOPHIL NFR BLD: 1 %
ERYTHROCYTE [DISTWIDTH] IN BLOOD BY AUTOMATED COUNT: 14 % (ref 11–15)
HBA1C MFR BLD: 5.3 % (ref 4–6)
HCT VFR BLD AUTO: 36.8 % (ref 35–48)
HGB BLD-MCNC: 12.8 G/DL (ref 12–16)
LYMPHOCYTES # BLD: 2.4 K/UL (ref 1–4)
LYMPHOCYTES NFR BLD: 19 %
MCH RBC QN AUTO: 32.5 PG (ref 27–32)
MCHC RBC AUTO-ENTMCNC: 34.8 G/DL (ref 32–37)
MCV RBC AUTO: 93.3 FL (ref 80–100)
MONOCYTES # BLD: 0.9 K/UL (ref 0–1)
MONOCYTES NFR BLD: 7 %
NEUTROPHILS # BLD AUTO: 9.1 K/UL (ref 1.8–7.7)
NEUTROPHILS NFR BLD: 73 %
PLATELET # BLD AUTO: 217 K/UL (ref 140–400)
PMV BLD AUTO: 10.7 FL (ref 7.4–10.3)
RBC # BLD AUTO: 3.95 M/UL (ref 3.7–5.4)
WBC # BLD AUTO: 12.5 K/UL (ref 4–11)

## 2017-09-26 PROCEDURE — 86780 TREPONEMA PALLIDUM: CPT

## 2017-09-26 PROCEDURE — 85025 COMPLETE CBC W/AUTO DIFF WBC: CPT

## 2017-09-26 PROCEDURE — 36415 COLL VENOUS BLD VENIPUNCTURE: CPT

## 2017-09-26 PROCEDURE — 83036 HEMOGLOBIN GLYCOSYLATED A1C: CPT

## 2017-09-27 LAB — T PALLIDUM AB SER QL: NEGATIVE

## 2017-09-28 ENCOUNTER — APPOINTMENT (OUTPATIENT)
Dept: OBGYN CLINIC | Facility: HOSPITAL | Age: 32
End: 2017-09-28
Payer: COMMERCIAL

## 2017-09-28 ENCOUNTER — HOSPITAL ENCOUNTER (OUTPATIENT)
Facility: HOSPITAL | Age: 32
Discharge: HOME | End: 2017-09-28
Attending: OBSTETRICS & GYNECOLOGY | Admitting: OBSTETRICS & GYNECOLOGY
Payer: COMMERCIAL

## 2017-09-28 ENCOUNTER — ROUTINE PRENATAL (OUTPATIENT)
Dept: OBGYN CLINIC | Facility: CLINIC | Age: 32
End: 2017-09-28

## 2017-09-28 VITALS
BODY MASS INDEX: 40 KG/M2 | HEART RATE: 86 BPM | DIASTOLIC BLOOD PRESSURE: 82 MMHG | WEIGHT: 232 LBS | SYSTOLIC BLOOD PRESSURE: 131 MMHG

## 2017-09-28 DIAGNOSIS — Z34.83 ENCOUNTER FOR SUPERVISION OF OTHER NORMAL PREGNANCY IN THIRD TRIMESTER: Primary | ICD-10-CM

## 2017-09-28 LAB
MULTISTIX LOT#: NORMAL NUMERIC
PH, URINE: 7.5 (ref 4.5–8)
SPECIFIC GRAVITY: 1.01 (ref 1–1.03)
URINE-COLOR: YELLOW
UROBILINOGEN,SEMI-QN: 0 MG/DL (ref 0–1.9)

## 2017-09-28 PROCEDURE — 59025 FETAL NON-STRESS TEST: CPT | Performed by: OBSTETRICS & GYNECOLOGY

## 2017-09-28 NOTE — NST
Nonstress Test   Patient: Jesus Mejai    Gestation: 37w0d    NST:       Variability: Moderate           Accelerations: Yes           Decelerations: None            Baseline: 140 BPM           Uterine Irritability: No           Contractions: Irregular

## 2017-09-29 NOTE — PROGRESS NOTES
[facility], [facility]  Obstetrics and Gynecology  Prenatal Visit  MD GIORGIO Montejo   Shena Stovall is a 34year old.o. [id number] 37w0d weeks. Patient continues to fax her Accu-Cheks weekly to Maternal-Fetal Medicine.   Patient recently had a hemoglobin Kit 1 Device by Other route 4 (four) times daily. Use as directed. Disp: 1 kit Rfl: 0   Glucose Blood (NAVEEN CONTOUR NEXT TEST) In Vitro Strip Use as directed.  Disp: 150 strip Rfl: 5   Acetone, Urine, Test (KETOSTIX) In Vitro Strip Test once daily Disp: 50

## 2017-10-02 ENCOUNTER — APPOINTMENT (OUTPATIENT)
Dept: OBGYN CLINIC | Facility: HOSPITAL | Age: 32
End: 2017-10-02
Payer: COMMERCIAL

## 2017-10-02 ENCOUNTER — HOSPITAL ENCOUNTER (OUTPATIENT)
Facility: HOSPITAL | Age: 32
Discharge: HOME | End: 2017-10-02
Attending: OBSTETRICS & GYNECOLOGY | Admitting: OBSTETRICS & GYNECOLOGY
Payer: COMMERCIAL

## 2017-10-02 PROBLEM — O24.414 INSULIN CONTROLLED WHITE CLASSIFICATION A2 GESTATIONAL DIABETES MELLITUS (GDM): Status: ACTIVE | Noted: 2017-06-01

## 2017-10-02 PROBLEM — O24.414 INSULIN CONTROLLED WHITE CLASSIFICATION A2 GESTATIONAL DIABETES MELLITUS (GDM) (HCC): Status: ACTIVE | Noted: 2017-06-01

## 2017-10-02 PROCEDURE — 59025 FETAL NON-STRESS TEST: CPT | Performed by: OBSTETRICS & GYNECOLOGY

## 2017-10-02 NOTE — NST
Nonstress Test   Patient: Oralia Paiz    Gestation: 37w4d    NST: Reactive       Variability: Moderate           Accelerations: Yes           Decelerations: None            Baseline: 135 BPM           Uterine Irritability: No           Contractions: I

## 2017-10-03 ENCOUNTER — ROUTINE PRENATAL (OUTPATIENT)
Dept: OBGYN CLINIC | Facility: CLINIC | Age: 32
End: 2017-10-03

## 2017-10-03 VITALS — DIASTOLIC BLOOD PRESSURE: 72 MMHG | SYSTOLIC BLOOD PRESSURE: 122 MMHG | BODY MASS INDEX: 40 KG/M2 | WEIGHT: 233.63 LBS

## 2017-10-03 DIAGNOSIS — Z34.83 ENCOUNTER FOR SUPERVISION OF OTHER NORMAL PREGNANCY IN THIRD TRIMESTER: Primary | ICD-10-CM

## 2017-10-03 RX ORDER — SODIUM CHLORIDE 0.9 % (FLUSH) 0.9 %
10 SYRINGE (ML) INJECTION AS NEEDED
Status: CANCELLED | OUTPATIENT
Start: 2017-10-03

## 2017-10-03 RX ORDER — DEXTROSE, SODIUM CHLORIDE, SODIUM LACTATE, POTASSIUM CHLORIDE, AND CALCIUM CHLORIDE 5; .6; .31; .03; .02 G/100ML; G/100ML; G/100ML; G/100ML; G/100ML
125 INJECTION, SOLUTION INTRAVENOUS CONTINUOUS
Status: CANCELLED | OUTPATIENT
Start: 2017-10-03

## 2017-10-03 RX ORDER — TERBUTALINE SULFATE 1 MG/ML
0.25 INJECTION, SOLUTION SUBCUTANEOUS AS NEEDED
Status: CANCELLED | OUTPATIENT
Start: 2017-10-03

## 2017-10-03 RX ORDER — IBUPROFEN 600 MG/1
600 TABLET ORAL ONCE AS NEEDED
Status: CANCELLED | OUTPATIENT
Start: 2017-10-03 | End: 2017-10-05

## 2017-10-03 RX ORDER — TRISODIUM CITRATE DIHYDRATE AND CITRIC ACID MONOHYDRATE 500; 334 MG/5ML; MG/5ML
30 SOLUTION ORAL AS NEEDED
Status: CANCELLED | OUTPATIENT
Start: 2017-10-03

## 2017-10-03 RX ORDER — ONDANSETRON 2 MG/ML
4 INJECTION INTRAMUSCULAR; INTRAVENOUS EVERY 6 HOURS PRN
Status: CANCELLED | OUTPATIENT
Start: 2017-10-03

## 2017-10-03 RX ORDER — AMMONIA INHALANTS 0.04 G/.3ML
0.3 INHALANT RESPIRATORY (INHALATION) AS NEEDED
Status: CANCELLED | OUTPATIENT
Start: 2017-10-03

## 2017-10-03 RX ORDER — LIDOCAINE HYDROCHLORIDE 10 MG/ML
30 INJECTION, SOLUTION EPIDURAL; INFILTRATION; INTRACAUDAL; PERINEURAL ONCE
Status: CANCELLED | OUTPATIENT
Start: 2017-10-03 | End: 2017-10-03

## 2017-10-03 NOTE — PROGRESS NOTES
PAN WNL. IOL Thursday, 10/5/2017 for A2 DM @ 7 AM.  Labor Precautions reviewed. Declines Flu vaccine.

## 2017-10-05 ENCOUNTER — HOSPITAL ENCOUNTER (OUTPATIENT)
Dept: OBGYN CLINIC | Facility: HOSPITAL | Age: 32
Setting detail: OBSERVATION
Discharge: HOME | End: 2017-10-05
Payer: COMMERCIAL

## 2017-10-05 ENCOUNTER — HOSPITAL ENCOUNTER (INPATIENT)
Facility: HOSPITAL | Age: 32
LOS: 2 days | Discharge: HOME | End: 2017-10-07
Attending: OBSTETRICS & GYNECOLOGY | Admitting: OBSTETRICS & GYNECOLOGY
Payer: COMMERCIAL

## 2017-10-05 PROBLEM — Z34.90 SUPERVISION OF NORMAL PREGNANCY: Status: ACTIVE | Noted: 2017-10-05

## 2017-10-05 PROBLEM — Z34.90 SUPERVISION OF NORMAL PREGNANCY (HCC): Status: ACTIVE | Noted: 2017-10-05

## 2017-10-05 PROCEDURE — 59400 OBSTETRICAL CARE: CPT | Performed by: OBSTETRICS & GYNECOLOGY

## 2017-10-05 RX ORDER — DIAPER,BRIEF,INFANT-TODD,DISP
1 EACH MISCELLANEOUS EVERY 6 HOURS PRN
Status: DISCONTINUED | OUTPATIENT
Start: 2017-10-05 | End: 2017-10-07

## 2017-10-05 RX ORDER — SIMETHICONE 80 MG
80 TABLET,CHEWABLE ORAL 3 TIMES DAILY PRN
Status: DISCONTINUED | OUTPATIENT
Start: 2017-10-05 | End: 2017-10-07

## 2017-10-05 RX ORDER — IBUPROFEN 200 MG
400 TABLET ORAL EVERY 4 HOURS PRN
Status: DISCONTINUED | OUTPATIENT
Start: 2017-10-05 | End: 2017-10-07

## 2017-10-05 RX ORDER — ONDANSETRON 2 MG/ML
4 INJECTION INTRAMUSCULAR; INTRAVENOUS EVERY 6 HOURS PRN
Status: DISCONTINUED | OUTPATIENT
Start: 2017-10-05 | End: 2017-10-05 | Stop reason: HOSPADM

## 2017-10-05 RX ORDER — SODIUM CHLORIDE 0.9 % (FLUSH) 0.9 %
10 SYRINGE (ML) INJECTION AS NEEDED
Status: DISCONTINUED | OUTPATIENT
Start: 2017-10-05 | End: 2017-10-07

## 2017-10-05 RX ORDER — IBUPROFEN 200 MG
200 TABLET ORAL EVERY 4 HOURS PRN
Status: DISCONTINUED | OUTPATIENT
Start: 2017-10-05 | End: 2017-10-07

## 2017-10-05 RX ORDER — BISACODYL 10 MG
10 SUPPOSITORY, RECTAL RECTAL ONCE AS NEEDED
Status: ACTIVE | OUTPATIENT
Start: 2017-10-05 | End: 2017-10-05

## 2017-10-05 RX ORDER — DOCUSATE SODIUM 100 MG/1
100 CAPSULE, LIQUID FILLED ORAL 2 TIMES DAILY
Status: DISCONTINUED | OUTPATIENT
Start: 2017-10-05 | End: 2017-10-07

## 2017-10-05 RX ORDER — TRISODIUM CITRATE DIHYDRATE AND CITRIC ACID MONOHYDRATE 500; 334 MG/5ML; MG/5ML
30 SOLUTION ORAL AS NEEDED
Status: DISCONTINUED | OUTPATIENT
Start: 2017-10-05 | End: 2017-10-05 | Stop reason: HOSPADM

## 2017-10-05 RX ORDER — AMMONIA INHALANTS 0.04 G/.3ML
0.3 INHALANT RESPIRATORY (INHALATION) AS NEEDED
Status: DISCONTINUED | OUTPATIENT
Start: 2017-10-05 | End: 2017-10-05 | Stop reason: HOSPADM

## 2017-10-05 RX ORDER — LIDOCAINE HYDROCHLORIDE 10 MG/ML
30 INJECTION, SOLUTION EPIDURAL; INFILTRATION; INTRACAUDAL; PERINEURAL ONCE
Status: DISCONTINUED | OUTPATIENT
Start: 2017-10-05 | End: 2017-10-05 | Stop reason: HOSPADM

## 2017-10-05 RX ORDER — PRENATAL VIT,CAL 76/IRON/FOLIC 29 MG-1 MG
1 TABLET ORAL DAILY
Status: DISCONTINUED | OUTPATIENT
Start: 2017-10-05 | End: 2017-10-07

## 2017-10-05 RX ORDER — ONDANSETRON 2 MG/ML
4 INJECTION INTRAMUSCULAR; INTRAVENOUS EVERY 6 HOURS PRN
Status: DISCONTINUED | OUTPATIENT
Start: 2017-10-05 | End: 2017-10-07

## 2017-10-05 RX ORDER — SODIUM CHLORIDE 0.9 % (FLUSH) 0.9 %
10 SYRINGE (ML) INJECTION AS NEEDED
Status: DISCONTINUED | OUTPATIENT
Start: 2017-10-05 | End: 2017-10-05 | Stop reason: HOSPADM

## 2017-10-05 RX ORDER — IBUPROFEN 600 MG/1
600 TABLET ORAL EVERY 4 HOURS PRN
Status: DISCONTINUED | OUTPATIENT
Start: 2017-10-05 | End: 2017-10-07

## 2017-10-05 RX ORDER — TERBUTALINE SULFATE 1 MG/ML
0.25 INJECTION, SOLUTION SUBCUTANEOUS AS NEEDED
Status: DISCONTINUED | OUTPATIENT
Start: 2017-10-05 | End: 2017-10-05 | Stop reason: HOSPADM

## 2017-10-05 RX ORDER — AMMONIA INHALANTS 0.04 G/.3ML
0.3 INHALANT RESPIRATORY (INHALATION) AS NEEDED
Status: DISCONTINUED | OUTPATIENT
Start: 2017-10-05 | End: 2017-10-07

## 2017-10-05 RX ORDER — SODIUM CHLORIDE, SODIUM LACTATE, POTASSIUM CHLORIDE, CALCIUM CHLORIDE 600; 310; 30; 20 MG/100ML; MG/100ML; MG/100ML; MG/100ML
INJECTION, SOLUTION INTRAVENOUS
Status: COMPLETED
Start: 2017-10-05 | End: 2017-10-05

## 2017-10-05 RX ORDER — IBUPROFEN 600 MG/1
600 TABLET ORAL ONCE AS NEEDED
Status: DISCONTINUED | OUTPATIENT
Start: 2017-10-05 | End: 2017-10-05 | Stop reason: HOSPADM

## 2017-10-05 RX ORDER — DEXTROSE, SODIUM CHLORIDE, SODIUM LACTATE, POTASSIUM CHLORIDE, AND CALCIUM CHLORIDE 5; .6; .31; .03; .02 G/100ML; G/100ML; G/100ML; G/100ML; G/100ML
125 INJECTION, SOLUTION INTRAVENOUS CONTINUOUS
Status: DISCONTINUED | OUTPATIENT
Start: 2017-10-05 | End: 2017-10-05 | Stop reason: HOSPADM

## 2017-10-05 NOTE — H&P
[facility]:  [facility]    1985 MRN [id number]   Location [address] [place] [address] Attending Demetrice Howell MD   Hosp Day # 0 PCP Kurt Corley.  DO RON Li Nonreactive  03/21/17 0943          Optional Initial Labs     Test Value Reference Range Date Time    TSH 0.90 uIU/mL 0.34 - 5.60 uIU/mL 03/21/17 0943    HCV        Pap Smear        GC DNA        Chlamydia DNA        GTT 1 Hr 173 mg/dL   mg/dL 03/21/17 094 Negative  Negative 03/15/16 1932    HgB A1c 5.3 % 4.0 - 6.0 % 09/26/17 1156    HGB Electrophoresis        Varicella Zoster        Cystic Fibrosis-Old         Cystic Fibrosis[32] (Required questions in OE to answer)        Cystic Fibrosis[165] (Required que Allergies/Medications: Allergies:   No Known Allergies  Medications:    Prescriptions Prior to Admission:  insulin aspart 100 UNIT/ML Subcutaneous Solution Inject 10 Units into the skin 2 (two) times daily.  Take with breakfast and dinner Disp: 1 vial R [16-18] 17  BP: (107-158)/(63-83) 142/69    Constitutional: alert and cooperative  Abdomen: FHT present and Fundal height approximately 38 cm  Fetal Surveillance: 140 BPM; Fetal heart variability: moderate  Fetal Heart Rate decelerations: none  Fetal Heart Shailesh Nino MD  10/5/2017  2:14 PM

## 2017-10-05 NOTE — PROGRESS NOTES
Patient received into room  [address]  [address]  . Bedside report received from [facility]. Patient transferred to bed from   . Bed locked and low position. Side rails up x 2. Vital sings normal limits, fundus firm at U/U, locia small, no clots noted.   Iv s

## 2017-10-06 NOTE — LACTATION NOTE
LACTATION NOTE - MOTHER      Evaluation Type: Inpatient    Problems identified  Problems identified: Knowledge deficit;Previous breast surgery  Previous breast surgery: Augmentation  Problems Identified Other: 1 year ago, nipples not removed    Maternal hi

## 2017-10-07 VITALS
TEMPERATURE: 98 F | RESPIRATION RATE: 16 BRPM | DIASTOLIC BLOOD PRESSURE: 81 MMHG | HEART RATE: 82 BPM | SYSTOLIC BLOOD PRESSURE: 124 MMHG

## 2017-10-07 RX ORDER — IBUPROFEN 600 MG/1
600 TABLET ORAL EVERY 4 HOURS PRN
Qty: 10 TABLET | Refills: 0 | Status: SHIPPED | OUTPATIENT
Start: 2017-10-07 | End: 2017-11-14

## 2017-10-07 NOTE — DISCHARGE SUMMARY
[place] [facility]    OB/GYNE Discharge Note      Prentis  Patient Status:  Inpatient    1985 MRN [id number]   Location [facility] 3SE Attending Yesenia Morgan MD   Hosp Day # 2 PCP Letty Soto.  DO Jen     Admit date:

## 2017-10-07 NOTE — PROGRESS NOTES
[facility]    Post Partum Progress Note    Prentis Banker Patient Status:  Inpatient    1985 MRN [id number]   Location [facility] 3SE Attending Yesenia Morgan MD   Hosp Day # 1 PCP Letty Soto.  [address] SPECGRAVITY 1.015 10/03/2017   PROUR 30  (A) 03/21/2017   GLUUR neg 10/03/2017   KETUR Trace (A) 03/21/2017   BILUR Negative 03/21/2017   BLOODURINE Negative 03/21/2017   NITRITE neg 10/03/2017   UROBILINOGEN <2.0 03/21/2017   LEUUR Large (A) 03/21/2017

## 2017-10-07 NOTE — PLAN OF CARE
Patient Centered Care    • Patient preferences are identified and integrated in the patient's plan of care Progressing        Patient/Family Goals    • Patient/Family Long Term Goal Progressing    • Patient/Family Short Term Goal Progressing        POSTPAR

## 2017-11-14 ENCOUNTER — OFFICE VISIT (OUTPATIENT)
Dept: OBGYN CLINIC | Facility: CLINIC | Age: 32
End: 2017-11-14

## 2017-11-14 VITALS — SYSTOLIC BLOOD PRESSURE: 118 MMHG | BODY MASS INDEX: 36 KG/M2 | DIASTOLIC BLOOD PRESSURE: 70 MMHG | WEIGHT: 208.38 LBS

## 2017-11-14 NOTE — PROGRESS NOTES
HPI:    Patient ID: Shena Stovall is a 34year old female. HPI  Patient here for postpartum exam. No C/Os. Switched to bottle feeding recently. Declines contraception for now. Has appointment with PCP to check diabetes.       Review of Systems   Co

## 2017-11-15 ENCOUNTER — OFFICE VISIT (OUTPATIENT)
Dept: FAMILY MEDICINE CLINIC | Facility: CLINIC | Age: 32
End: 2017-11-15

## 2017-11-15 VITALS
SYSTOLIC BLOOD PRESSURE: 138 MMHG | DIASTOLIC BLOOD PRESSURE: 85 MMHG | HEART RATE: 92 BPM | HEIGHT: 64 IN | BODY MASS INDEX: 35.68 KG/M2 | WEIGHT: 209 LBS

## 2017-11-15 DIAGNOSIS — M65.9 TENOSYNOVITIS, WRIST: Primary | ICD-10-CM

## 2017-11-15 PROCEDURE — 99213 OFFICE O/P EST LOW 20 MIN: CPT | Performed by: FAMILY MEDICINE

## 2017-11-15 PROCEDURE — 99212 OFFICE O/P EST SF 10 MIN: CPT | Performed by: FAMILY MEDICINE

## 2017-11-15 RX ORDER — NAPROXEN 500 MG/1
500 TABLET ORAL 2 TIMES DAILY WITH MEALS
Qty: 60 TABLET | Refills: 1 | Status: ON HOLD | OUTPATIENT
Start: 2017-11-15 | End: 2018-03-23

## 2017-11-15 NOTE — PROGRESS NOTES
Blood pressure 138/85, pulse 92, height 5' 4\" (1.626 m), weight 209 lb (94.8 kg), last menstrual period 01/12/2017, not currently breastfeeding. Patient presents today complaining of bilateral wrist pain some numbness of the fingers.   Has wrist splints

## 2017-11-17 ENCOUNTER — APPOINTMENT (OUTPATIENT)
Dept: LAB | Facility: HOSPITAL | Age: 32
End: 2017-11-17
Attending: FAMILY MEDICINE
Payer: COMMERCIAL

## 2017-11-17 DIAGNOSIS — M65.9 TENOSYNOVITIS, WRIST: ICD-10-CM

## 2017-11-17 PROCEDURE — 36415 COLL VENOUS BLD VENIPUNCTURE: CPT

## 2017-11-17 PROCEDURE — 82947 ASSAY GLUCOSE BLOOD QUANT: CPT

## 2017-11-17 PROCEDURE — 80061 LIPID PANEL: CPT

## 2017-11-21 ENCOUNTER — OFFICE VISIT (OUTPATIENT)
Dept: FAMILY MEDICINE CLINIC | Facility: CLINIC | Age: 32
End: 2017-11-21

## 2017-11-21 VITALS
SYSTOLIC BLOOD PRESSURE: 111 MMHG | HEART RATE: 67 BPM | BODY MASS INDEX: 36.19 KG/M2 | DIASTOLIC BLOOD PRESSURE: 71 MMHG | WEIGHT: 212 LBS | TEMPERATURE: 98 F | HEIGHT: 64 IN

## 2017-11-21 DIAGNOSIS — M65.9 TENOSYNOVITIS, WRIST: Primary | ICD-10-CM

## 2017-11-21 DIAGNOSIS — R73.01 IMPAIRED FASTING GLUCOSE: ICD-10-CM

## 2017-11-21 PROBLEM — M65.939 TENOSYNOVITIS, WRIST: Status: ACTIVE | Noted: 2017-11-21

## 2017-11-21 PROCEDURE — 99212 OFFICE O/P EST SF 10 MIN: CPT | Performed by: FAMILY MEDICINE

## 2017-11-21 PROCEDURE — 99213 OFFICE O/P EST LOW 20 MIN: CPT | Performed by: FAMILY MEDICINE

## 2017-11-21 RX ORDER — OMEPRAZOLE 20 MG/1
20 CAPSULE, DELAYED RELEASE ORAL
Qty: 30 CAPSULE | Refills: 1 | Status: ON HOLD | OUTPATIENT
Start: 2017-11-21 | End: 2018-03-23

## 2017-11-21 NOTE — PROGRESS NOTES
Blood pressure 111/71, pulse 67, temperature 97.9 °F (36.6 °C), temperature source Oral, height 5' 4\" (1.626 m), weight 212 lb (96.2 kg), last menstrual period 01/12/2017, not currently breastfeeding.     Following up for lab results and tenosynovitis had

## 2017-12-18 ENCOUNTER — TELEPHONE (OUTPATIENT)
Dept: OBGYN CLINIC | Facility: CLINIC | Age: 32
End: 2017-12-18

## 2017-12-18 NOTE — TELEPHONE ENCOUNTER
PER PT REQUESTING A LETTER THAT STATE PT CAN RETURN TO WORK / PT WOULD LIKE THE LETTER FAX TO HER [employer] / Retha Lefort [phone] / Bina Mendoza: Marv Villagran / JAN ADV

## 2017-12-19 NOTE — TELEPHONE ENCOUNTER
Pt called and read letter that was generated. Pt voices approval. Letter faxed to number provided and receipt of confirmation received.

## 2018-03-22 ENCOUNTER — APPOINTMENT (OUTPATIENT)
Dept: MRI IMAGING | Facility: HOSPITAL | Age: 33
DRG: 781 | End: 2018-03-22
Attending: NURSE PRACTITIONER
Payer: MEDICAID

## 2018-03-22 ENCOUNTER — HOSPITAL ENCOUNTER (OUTPATIENT)
Facility: HOSPITAL | Age: 33
Setting detail: OBSERVATION
Discharge: HOME | DRG: 781 | End: 2018-03-23
Attending: HOSPITALIST | Admitting: HOSPITALIST
Payer: MEDICAID

## 2018-03-22 ENCOUNTER — APPOINTMENT (OUTPATIENT)
Dept: ULTRASOUND IMAGING | Facility: HOSPITAL | Age: 33
DRG: 781 | End: 2018-03-22
Attending: NURSE PRACTITIONER
Payer: MEDICAID

## 2018-03-22 DIAGNOSIS — N20.1 URETEROLITHIASIS: Primary | ICD-10-CM

## 2018-03-22 DIAGNOSIS — D72.829 LEUKOCYTOSIS, UNSPECIFIED TYPE: ICD-10-CM

## 2018-03-22 PROCEDURE — 76815 OB US LIMITED FETUS(S): CPT | Performed by: NURSE PRACTITIONER

## 2018-03-22 PROCEDURE — 72195 MRI PELVIS W/O DYE: CPT | Performed by: NURSE PRACTITIONER

## 2018-03-22 PROCEDURE — 99222 1ST HOSP IP/OBS MODERATE 55: CPT | Performed by: HOSPITALIST

## 2018-03-22 PROCEDURE — 74181 MRI ABDOMEN W/O CONTRAST: CPT | Performed by: NURSE PRACTITIONER

## 2018-03-22 RX ORDER — ACETAMINOPHEN 500 MG
500 TABLET ORAL ONCE
Status: COMPLETED | OUTPATIENT
Start: 2018-03-22 | End: 2018-03-22

## 2018-03-22 RX ORDER — MORPHINE SULFATE 4 MG/ML
4 INJECTION, SOLUTION INTRAMUSCULAR; INTRAVENOUS ONCE
Status: COMPLETED | OUTPATIENT
Start: 2018-03-22 | End: 2018-03-22

## 2018-03-22 RX ORDER — ONDANSETRON 2 MG/ML
4 INJECTION INTRAMUSCULAR; INTRAVENOUS ONCE
Status: COMPLETED | OUTPATIENT
Start: 2018-03-22 | End: 2018-03-22

## 2018-03-22 NOTE — ED NOTES
Pt here for LLQ pain since this morning. Pt has had one episode of diarrhea, one episode of vomiting. Pt reports dysuria with difficulty passing urine. Pt states she feels the need to void but cannot. No fevers, no SOB.  Pt states she is 15 weeks pregnant,

## 2018-03-22 NOTE — ED PROVIDER NOTES
Patient Seen in: [facility] AND [facility] Emergency Department    History   Patient presents with:  Pregnancy Issues (gynecologic)    Stated Complaint:     HPI  79-year-old female,  5 para 3 approximately 15 weeks pregnant, presents to the emergency depa EOM are normal.   Neck: Neck supple. Cardiovascular: Normal rate and regular rhythm. No murmur heard. Pulmonary/Chest: Effort normal and breath sounds normal.   Abdominal: Soft. Bowel sounds are normal. There is no tenderness.  There is no rebound and RAINBOW DRAW GOLD   RAINBOW DRAW LAVENDER TALL (BNP)       ED Course as of Mar 22 1922  ------------------------------------------------------------       MDM       WBC 19.2  Viral enteritis vs diverticulitis  Pt is requesting food-I did advise her to wa

## 2018-03-22 NOTE — ED INITIAL ASSESSMENT (HPI)
Pt reports left sided abd pain and reports being 15 weeks pregnant. Pt denies discharge or vaginal bleeding. Pt reports this is her  .

## 2018-03-23 ENCOUNTER — APPOINTMENT (OUTPATIENT)
Dept: ULTRASOUND IMAGING | Facility: HOSPITAL | Age: 33
DRG: 781 | End: 2018-03-23
Attending: OBSTETRICS & GYNECOLOGY
Payer: MEDICAID

## 2018-03-23 VITALS
OXYGEN SATURATION: 98 % | HEART RATE: 99 BPM | HEIGHT: 64 IN | RESPIRATION RATE: 16 BRPM | WEIGHT: 218.81 LBS | TEMPERATURE: 99 F | SYSTOLIC BLOOD PRESSURE: 137 MMHG | BODY MASS INDEX: 37.36 KG/M2 | DIASTOLIC BLOOD PRESSURE: 81 MMHG

## 2018-03-23 PROBLEM — D72.829 LEUKOCYTOSIS, UNSPECIFIED TYPE: Status: ACTIVE | Noted: 2018-03-23

## 2018-03-23 PROCEDURE — 99223 1ST HOSP IP/OBS HIGH 75: CPT | Performed by: UROLOGY

## 2018-03-23 PROCEDURE — 99254 IP/OBS CNSLTJ NEW/EST MOD 60: CPT | Performed by: OBSTETRICS & GYNECOLOGY

## 2018-03-23 PROCEDURE — 99239 HOSP IP/OBS DSCHRG MGMT >30: CPT | Performed by: HOSPITALIST

## 2018-03-23 PROCEDURE — 76816 OB US FOLLOW-UP PER FETUS: CPT | Performed by: OBSTETRICS & GYNECOLOGY

## 2018-03-23 RX ORDER — AMOXICILLIN AND CLAVULANATE POTASSIUM 500; 125 MG/1; MG/1
1 TABLET, FILM COATED ORAL 2 TIMES DAILY
Qty: 14 TABLET | Refills: 0 | Status: ON HOLD | OUTPATIENT
Start: 2018-03-23 | End: 2018-09-01

## 2018-03-23 RX ORDER — POTASSIUM CHLORIDE 20 MEQ/1
40 TABLET, EXTENDED RELEASE ORAL ONCE
Status: COMPLETED | OUTPATIENT
Start: 2018-03-23 | End: 2018-03-23

## 2018-03-23 RX ORDER — 0.9 % SODIUM CHLORIDE 0.9 %
VIAL (ML) INJECTION
Status: COMPLETED
Start: 2018-03-23 | End: 2018-03-23

## 2018-03-23 RX ORDER — SODIUM CHLORIDE 9 MG/ML
INJECTION, SOLUTION INTRAVENOUS CONTINUOUS
Status: DISCONTINUED | OUTPATIENT
Start: 2018-03-23 | End: 2018-03-23

## 2018-03-23 RX ORDER — MORPHINE SULFATE 4 MG/ML
4 INJECTION, SOLUTION INTRAMUSCULAR; INTRAVENOUS EVERY 2 HOUR PRN
Status: DISCONTINUED | OUTPATIENT
Start: 2018-03-23 | End: 2018-03-23

## 2018-03-23 RX ORDER — MULTIVIT,IRON,MINERALS/LUTEIN
TABLET ORAL
COMMUNITY
End: 2019-01-12

## 2018-03-23 RX ORDER — ACETAMINOPHEN 325 MG/1
650 TABLET ORAL EVERY 6 HOURS PRN
Status: DISCONTINUED | OUTPATIENT
Start: 2018-03-23 | End: 2018-03-23

## 2018-03-23 RX ORDER — DEXTROSE AND SODIUM CHLORIDE 5; .45 G/100ML; G/100ML
INJECTION, SOLUTION INTRAVENOUS CONTINUOUS
Status: DISPENSED | OUTPATIENT
Start: 2018-03-23 | End: 2018-03-23

## 2018-03-23 RX ORDER — MORPHINE SULFATE 2 MG/ML
2 INJECTION, SOLUTION INTRAMUSCULAR; INTRAVENOUS EVERY 2 HOUR PRN
Status: DISCONTINUED | OUTPATIENT
Start: 2018-03-23 | End: 2018-03-23

## 2018-03-23 RX ORDER — ONDANSETRON 2 MG/ML
4 INJECTION INTRAMUSCULAR; INTRAVENOUS EVERY 6 HOURS PRN
Status: DISCONTINUED | OUTPATIENT
Start: 2018-03-23 | End: 2018-03-23

## 2018-03-23 NOTE — PLAN OF CARE
Problem: Patient/Family Goals  Goal: Patient/Family Long Term Goal  Patient's Long Term Goal: to go home    Interventions:  - See additional Care Plan goals for specific interventions   Outcome: Progressing    Goal: Patient/Family Short Term Goal  Patient' pain  - Anticipate increased pain with activity and pre-medicate as appropriate  Outcome: Progressing  Pt was oriented to 5th floor around 0030. Pt A&O x4, on Room Air. Pt reports difficulty urinating. Urine strained per orders.  Pt receiving IV fluids, has

## 2018-03-23 NOTE — DISCHARGE SUMMARY
[place] [facility] - [facility]    Discharge Summary    Sharon Marrufo Patient Status:  Inpatient    1985 MRN [id number]   Location [facility] 5SW/SE Attending Eduar Carrasco MD   Hosp Day # 0 PCP Sachi Butler.  DO Jen     Date of Admission: 3 daily.      Home Meds - Unchanged    Prenatal Vit-Fe Fumarate-FA (MULTI PRENATAL) 27-0.8 MG Oral Tab  Take by mouth.               Discharge Diet: high liquid diet     Discharge Activity: As tolerated       Discharge Medications      START taking these medi

## 2018-03-23 NOTE — PROGRESS NOTES
30 [facility] Patient Status:  Inpatient    1985 MRN [id number]   Location [facility] 5SW/SE Attending Erasmo Dean MD   Hosp Day # 0 PCP Louise Child DO     Date of Admission: (36.8 °C)-98.6 °F (37 °C)] 98.6 °F (37 °C)  Pulse:  [71-99] 99  Resp:  [16-20] 16  BP: (121-137)/(60-92) 137/81    Intake/Output Summary (Last 24 hours) at 03/23/18 0858  Last data filed at 03/23/18 0600   Gross per 24 hour   Intake             1000 ml   O Lab Results  Component Value Date   WBC 19.2 (H) 03/22/2018   HGB 14.5 03/22/2018   HCT 42.6 03/22/2018    03/22/2018   CREATSERUM 0.91 03/22/2018   BUN 8 03/22/2018    (L) 03/22/2018   K 3.7 03/22/2018    03/22/2018   CO2 22 03/22/2

## 2018-03-23 NOTE — H&P
30 [facility] Patient Status:  Emergency    1985 MRN [id number]   Location [address] Attending No att. providers found   [facility] Day # 0 PCP Rupinder March.  DO Jen affected area 2 times daily. naproxen (NAPROSYN) 500 MG Oral Tab   No No   Sig: Take 1 tablet (500 mg total) by mouth 2 (two) times daily with meals.    omeprazole 20 MG Oral Capsule Delayed Release   No No   Sig: Take 1 capsule (20 mg total) by mouth arsh 03/22/2018   HGB 14.5 03/22/2018   HCT 42.6 03/22/2018    03/22/2018   CREATSERUM 0.91 03/22/2018   BUN 8 03/22/2018    03/22/2018   K 3.7 03/22/2018    03/22/2018   CO2 22 03/22/2018    03/22/2018   CA 9.7 03/22/2018       Jose

## 2018-03-23 NOTE — CONSULTS
[facility] Patient Status:  Inpatient    1985 MRN [id number]   Location [facility] 5SW/SE Attending Rajan Galvez. [address] Day # 0 PCP Magali Dodd.  DO Jen     DATE OF ADMISSION: 3/22/2018 infections with high fever or flank pain. No history of bladder infections. No history of tumors or cancers of the kidneys, ureters, bladder or urethra.   The patient denies urological workup in the past including intravenous pyelogram, ultrasound, or CA seizures or depression. 10. No history of endocrine disorders in the form of diabetes or thyroid disease. With the exception of gestational diabetes presently not on antidiabetic medication  11.  No history of cancer of any type, bleeding disorders, HIV e and hair distribution including labia which are normal. Vaginally, no masses, discharge or tenderness. Adnexal areas also normal, no masses or tenderness.   Anterior aspect of the vagina including base of bladder, urethra are normal without masses or tende probably have repeat CBC today but it appears patient passed her stone is asymptomatic        ASSESSMENT:  #1 left renal colic  2.   Small left distal ureteral calculi assumed passed    PLAN:  Patient asymptomatic at the present time and again tiny 3 mm UVJ

## 2018-03-27 ENCOUNTER — TELEPHONE (OUTPATIENT)
Dept: INTERNAL MEDICINE UNIT | Facility: HOSPITAL | Age: 33
End: 2018-03-27

## 2018-05-02 NOTE — TELEPHONE ENCOUNTER
Ngozi Zee had questions regarding her blood glucose and her increased dose of insulin. Currently taking 6 units NPH at breakfast and 8 units NPH at HS.   I requested that she fax her blood glucose log to [facility] this morning for Dr. Klaus Franco to review and   I antonia Patient: Robbie Mcintyre    : 1946 Attending:Milton Ruiz MD   72 year old male        Chief complaint: /CKD4    INITIAL Eval/Consult/HPI:  73 yo male with h/o ckd4, pvf, h/o thrombosed right pop stent in the past, afib, chf, ef 42% admitted fiwth recent issues with multivessel and valvular heart disease and is now  s/p AVR, MV repair, CABG, AMADOU/MAZE 18 and has h/o ckd4 whose creat usually is in the low 2.0s and sometimes high 2.0s but of lated has been 3.1 and post op is the same hence the renal evaluuation. Today, he is c/o back pain at CT insertion sides. No ha no blurring of vision. No sob, no mosquera, has adames with no prior problems with uo    Past Medical History:   Diagnosis Date   • Abdominal aortic aneurysm (CMS/HCC) 2016   • AF (paroxysmal atrial fibrillation) (CMS/HCC)    • Bilateral popliteal artery aneurysm (CMS/HCC) 2015   • Cardiomyopathy, ischemic     LVEF 25%   • Cataract    • CKD (chronic kidney disease) stage 3, GFR 30-59 ml/min    • Congestive cardiac failure (CMS/HCC)     EF 15%, has life vest   • Gout    • High cholesterol    • Insomnia 2017       Past Surgical History:   Procedure Laterality Date   • Bypass graft othr,fem-pop Right 2017    Right superficial femoral artery to tibioperoneal trunk reverse saphenous vein bypass graft using contralateral great saphenous vein.   • Cardiac catherization         • Cataract extraction, bilateral  2009    right eye   • Discission,2nd cataract,laser      Yag Laser/Second Cataract, right eye   • Esophagogastroduodenoscopy  2016       • Eye surgery     • Leg surgery      for ruptured Quad.   • Lower extremity angiogram Right 2017    Right leg diagnostic angiogram   • Quadriceps repair         ALLERGIES:  No Known Allergies    Medications/Infusions:  Scheduled:   • levothyroxine  50 mcg Oral QAM AC   • insulin regular (human)   Subcutaneous TID AC   • warfarin  2.5 mg Oral Once    • simvastatin  20 mg Oral Nightly   • chlorhexidine gluconate  15 mL Swish & Spit 2 times per day   • docusate sodium-sennosides  2 tablet Oral Daily   • [START ON 5/3/2018] bisacodyl  10 mg Rectal Once   • [START ON 5/5/2018] polyethylene glycol  17 g Oral BID   • sodium chloride (PF)  2 mL Injection 2 times per day   • WARFARIN - PHYSICIAN MONITORED 1 each  1 each Does not apply Q Evening   • aspirin  81 mg Oral Daily   • allopurinol  100 mg Oral Daily   • famotidine  20 mg Intravenous Nightly       Continuous Infusions:   • dextrose 5 % infusion     • sodium chloride 0.9% infusion     • sodium chloride 0.9% infusion     • sodium chloride 0.9% infusion     • nitroGLYcerin 50 mg in dextrose 5% 250 mL infusion     • fenoldopam (CORLOPAM) 10 mg in sodium chloride 0.9 % 100 mL infusion     • niCARdipine (CARDENE) 20 mg in sodium chloride 0.9% 200 mL premix         Social History     Social History   • Marital status:      Spouse name: Yaneth   • Number of children: 4   • Years of education: N/A     Occupational History   • retired      Social History Main Topics   • Smoking status: Former Smoker     Years: 20.00     Types: Cigars     Quit date: 3/22/1998   • Smokeless tobacco: Never Used   • Alcohol use No   • Drug use: No      Comment: 4-26-18   • Sexual activity: No     Other Topics Concern   • Seat Belt Yes     Social History Narrative   • No narrative on file       Family History   Problem Relation Age of Onset   • Cancer Sister 26     Pancreatic Cancer   • Cancer Sister 2     Cancer   • Stroke Mother    • Dementia/Alzheimers Mother      Alzheimer's   • Stroke Father    • Kidney disease Son      on dialysis, ? cause   • Other Son      Back problems d/t MVA       ROS: 12 point ROS reviewed and unremarkable except for what was elicited in the H and P      Vital Last Value 24 Hour Range   Temperature 98.2 °F (36.8 °C) Temp  Min: 97.5 °F (36.4 °C)  Max: 99.9 °F (37.7 °C)   Pulse 81 Pulse  Min: 58  Max: 81    Respiratory 26 Resp  Min: 14  Max: 37   Blood Pressure 108/62 BP  Min: 95/54  Max: 116/63   Art BP 87/63 Arterial Line BP  Min: 86/53  Max: 118/59   Pulse Oximetry 100 % SpO2  Min: 95 %  Max: 100 %     Vital Today Admitted   Weight 95.2 kg Weight: 92.1 kg   Height N/A Height: 6' (182.9 cm)   BMI N/A BMI (Calculated): 27.6     Weight over the past 48 Hours:  Patient Vitals for the past 48 hrs:   Weight   05/01/18 0535 92.1 kg   05/02/18 0400 95.2 kg        Hemodynamics:      Last Value 24 Hour Range   CVP (!) 22 mmHg CVP (mmHg)  Min: 6 mmHg  Max: 22 mmHg   PAS/PAD (!) 40/23 PAP (mmHg)  Min: 32/19  Max: 44/24   PCWP   No Data Recorded   CO 5.1 l/min Cardiac Output (l/min)  Min: 5.1 l/min  Max: 8.7 l/min   CI (!) 2.4 l/min/m2 Cardiac Index (l/min/m2)  Min: 2.4 l/min/m2  Max: 4.1 l/min/m2    (dyne*sec)/cm5 SVR (dyne*sec)/cm5  Min: 510 (dyne*sec)/cm5  Max: 1065 (dyne*sec)/cm5   SV02 (!) 58 % SVO2  Min: 56 %  Max: 64 %     Intake/Output:    Last Stool Occurrence:      I/O this shift:  In: -   Out: 245 [Urine:125; Chest Tube:120]    I/O last 3 completed shifts:  In: 6623 [P.O.:200; I.V.:3408; Blood:1265; IV Piggyback:1750]  Out: 5170 [Urine:2440; Blood:1727; Chest Tube:1003]      Intake/Output Summary (Last 24 hours) at 05/02/18 1434  Last data filed at 05/02/18 1000   Gross per 24 hour   Intake             2238 ml   Output             2353 ml   Net             -115 ml         Physical Exam:  GENERAL: ALERT ORIENTED X 3, NOT IN RESPIRATORY DISTRESS, APPROPRIATE MOOD AND AFFECT  HEENT: NORMOCEPHALLIC ATRAUMATIC,PERRL, NO PALLOR, NO ICTERUS, MUCOUS MEMBRANE MOIST, NO LESIONS  NECK: SUPPLE, NO ELEVATED JVD, NO CERVICAL AND NO SUPRACLAVICULAR LYMPHADENOPATHY, NO GOITER, NO MASS, TRACHEA MIDLINE  CHEST: SYMMETRIC AIRENTRY,GOOD A/E B/L ANT, DECREASE B/S BASES B/L  HEART: REGULAR RATE & RHYTHM,  S1, S2, NO RUB  ABDOMEN: SOFT, NON TENDER, NO DISTENTION, NO HEPATOSPLENOMEGALY, BS PRESENT  EXTREMITIES: NO  CYANOSIS,  NOCLUBBING, NO EDEMA  NEUROMUSCULAR: MOVES ALL EXTREMITIES, GROSSLY INTACT, NON-FOCAL  SKIN: NO RASH, NO LESIONS, NO NODULES      Laboratory Results:  Lab Results   Component Value Date    FIO2 40 05/01/2018    APH 7.33 (L) 05/02/2018    APCO2 45 05/02/2018    APO2 133 (H) 05/02/2018    AHCO3 23 05/02/2018    ASAT 99 05/01/2018    URIC 4.1 05/17/2017     Lab Results   Component Value Date    INR 1.1 05/02/2018    PTT 27 05/01/2018    INTAC 118 (H) 04/26/2018       Urine Panel  Lab Results   Component Value Date    UKET NEGATIVE 04/27/2018    UPROT NEGATIVE 04/27/2018    UWBC NEGATIVE 04/27/2018    URBC NEGATIVE 04/27/2018    UNITR NEGATIVE 04/27/2018    UBILI NEGATIVE 04/27/2018    UPH 5.5 04/27/2018    USPG 1.015 04/27/2018    UBACTR NONE SEEN 04/27/2018       Recent Labs      05/01/18   1308  05/02/18   0315  05/02/18   0550  05/02/18   1119   SODIUM   --   143   --    --    POTASSIUM  4.5  5.6*  5.3*  5.1   CHLORIDE   --   108*   --    --    CO2   --   25   --    --    ANIONGAP   --   16   --    --    BUN   --   48*   --    --    CREATININE   --   3.06*   --    --    GLUCOSE   --   146*   --    --    SUKH   --   1.22   --    --    WBC  7.3  10.8   --    --    HGB  8.6*  7.4*   --    --    HCT  27.3*  24.3*   --    --    PLT  74*  70*   --    --          Diagnosis/Plan:  · CKD4, creat baseline of late has been higher in the low 3.0s , creat seems stable at present although slightly higher than baseline in the past  · Hyperkalemia, post op, slightly elizabet now, repeat in am  · Hypotension , off pressors, intermittent drops but overall stable  · Anemia, check iron , prbc if this trends lower, dose procrit. Will likely need given ckd4  · Volume status ok , no need for lasix at present, will reassess in am  · Check phosph in am  · Dw/ CTS team, CC RN  · Will follow closely, ty for the consult    Ziggy Piña MD  (p) [phone]  (o) [phone]  (c) [phone]

## 2018-05-18 ENCOUNTER — LAB ENCOUNTER (OUTPATIENT)
Dept: LAB | Facility: HOSPITAL | Age: 33
End: 2018-05-18
Attending: OBSTETRICS & GYNECOLOGY
Payer: MEDICAID

## 2018-05-18 DIAGNOSIS — O99.212 OBESITY COMPLICATING PREGNANCY IN SECOND TRIMESTER: Primary | ICD-10-CM

## 2018-05-18 PROCEDURE — 93010 ELECTROCARDIOGRAM REPORT: CPT | Performed by: OBSTETRICS & GYNECOLOGY

## 2018-05-18 PROCEDURE — 93005 ELECTROCARDIOGRAM TRACING: CPT

## 2018-06-08 ENCOUNTER — HOSPITAL ENCOUNTER (OUTPATIENT)
Dept: ENDOCRINOLOGY | Facility: HOSPITAL | Age: 33
Discharge: HOME | End: 2018-06-08
Attending: OBSTETRICS & GYNECOLOGY
Payer: MEDICAID

## 2018-06-08 VITALS — HEIGHT: 64 IN | WEIGHT: 227.38 LBS | BODY MASS INDEX: 38.82 KG/M2

## 2018-06-08 DIAGNOSIS — O24.410 DIET CONTROLLED GESTATIONAL DIABETES MELLITUS (GDM) IN THIRD TRIMESTER: Primary | ICD-10-CM

## 2018-06-08 RX ORDER — BLOOD-GLUCOSE METER
KIT MISCELLANEOUS
Qty: 100 EACH | Refills: 2 | Status: SHIPPED | OUTPATIENT
Start: 2018-06-08 | End: 2019-01-12

## 2018-06-08 NOTE — PROGRESS NOTES
Rafael Beavers  : 1985 was seen for Gestational Diabetes Counseling: Group    Date: 2018   Start time: 915  End time:     Diet: Eating three meals daily, indicated she is not eating snacks. Eats out 2-4 times a week.  Includes sweets 3-6 log to each MD office visit. 4. Encouraged activity if no restrictions. 5. Encouraged Dora to call diabetes center with any questions or concerns. Patient verbalized understanding and had no further questions at this time.     Maren Heimlich

## 2018-06-14 ENCOUNTER — APPOINTMENT (OUTPATIENT)
Dept: ENDOCRINOLOGY | Facility: HOSPITAL | Age: 33
End: 2018-06-14
Attending: OBSTETRICS & GYNECOLOGY
Payer: MEDICAID

## 2018-06-15 ENCOUNTER — HOSPITAL ENCOUNTER (OUTPATIENT)
Dept: ENDOCRINOLOGY | Facility: HOSPITAL | Age: 33
Discharge: HOME | End: 2018-06-15
Attending: OBSTETRICS & GYNECOLOGY
Payer: MEDICAID

## 2018-06-15 VITALS — WEIGHT: 227.5 LBS | BODY MASS INDEX: 38.84 KG/M2 | HEIGHT: 64 IN

## 2018-06-15 DIAGNOSIS — O24.410 DIET CONTROLLED GESTATIONAL DIABETES MELLITUS (GDM) IN THIRD TRIMESTER: Primary | ICD-10-CM

## 2018-06-15 NOTE — PROGRESS NOTES
Mary Casey  : 1985 was seen for GDM Follow-Up Counseling    Date: 6/15/2018   Start time: 0900  End time: 1030    Assessment: Ht 64\"   Wt 227 lb 8 oz   LMP 2017   BMI 39.05 kg/m²    Weight: Wt Readings from Last 6 Encounters:  06/15/18 95 twice in 1 week. If 2 hr PP is > 120 twice in 1 week at any one meal.    Injection Instruction:  Medication type, dose and frequency: NPH, 6 units @ HS. Also reviewed Novolog/Humalog with patient in the event it is added if the future.      Syringe:

## 2018-07-12 ENCOUNTER — APPOINTMENT (OUTPATIENT)
Dept: ENDOCRINOLOGY | Facility: HOSPITAL | Age: 33
End: 2018-07-12
Attending: OBSTETRICS & GYNECOLOGY
Payer: MEDICAID

## 2018-08-03 ENCOUNTER — TELEPHONE (OUTPATIENT)
Dept: ENDOCRINOLOGY | Facility: HOSPITAL | Age: 33
End: 2018-08-03

## 2018-08-03 NOTE — TELEPHONE ENCOUNTER
LMTCB and schedule gestational diabetes follow up. Patient was a no show on 07/09/18. Letter faxed to Dr. Valentin Kevin.

## 2018-08-09 ENCOUNTER — APPOINTMENT (OUTPATIENT)
Dept: ULTRASOUND IMAGING | Facility: HOSPITAL | Age: 33
End: 2018-08-09
Attending: OBSTETRICS & GYNECOLOGY
Payer: MEDICAID

## 2018-08-09 ENCOUNTER — HOSPITAL ENCOUNTER (OUTPATIENT)
Facility: HOSPITAL | Age: 33
Setting detail: OBSERVATION
Discharge: HOME | End: 2018-08-09
Attending: OBSTETRICS & GYNECOLOGY | Admitting: OBSTETRICS & GYNECOLOGY
Payer: MEDICAID

## 2018-08-09 VITALS — SYSTOLIC BLOOD PRESSURE: 121 MMHG | DIASTOLIC BLOOD PRESSURE: 72 MMHG | HEART RATE: 88 BPM

## 2018-08-09 PROBLEM — Z34.90 PREGNANCY: Status: ACTIVE | Noted: 2018-08-09

## 2018-08-09 PROBLEM — Z34.90 PREGNANCY (HCC): Status: ACTIVE | Noted: 2018-08-09

## 2018-08-09 PROCEDURE — 76815 OB US LIMITED FETUS(S): CPT

## 2018-08-09 PROCEDURE — 99214 OFFICE O/P EST MOD 30 MIN: CPT

## 2018-08-09 PROCEDURE — 99215 OFFICE O/P EST HI 40 MIN: CPT

## 2018-08-09 PROCEDURE — 59025 FETAL NON-STRESS TEST: CPT

## 2018-08-09 PROCEDURE — 76819 FETAL BIOPHYS PROFIL W/O NST: CPT | Performed by: OBSTETRICS & GYNECOLOGY

## 2018-08-10 NOTE — TRIAGE
[facility]      Triage Note    Anel Alaniz Patient Status:  Observation    1985 MRN [id number]   Location [address] [facility] Attending [place] MD Sabino   Hosp Day # 0 PCP Erick Gibbons.  Maribeth Turner Baseline: 140 BPM           Uterine Irritability: No           Contractions: Irregular                       Acoustic Stimulator: No           Nonstress Test Interpretation: Reactive           Nonstress Test Second Interpretation: Reactive          F

## 2018-08-10 NOTE — TRIAGE
[facility] HOSP - [facility]      Triage Note    Maryuri Palacios Patient Status:  Observation    1985 MRN [id number]   Location [address]  Attending Josh Chavez MD   Hosp Day # 0 PCP Shantel Zambrano.  Deanna James None            Baseline: 140 BPM           Uterine Irritability: No           Contractions: Irregular                       Acoustic Stimulator: No           Nonstress Test Interpretation: Reactive           Nonstress Test Second Interpretation: Reactive

## 2018-08-10 NOTE — PROGRESS NOTES
Pt is a  who presented for BPP from office d/t 2 small decels noted on otherwise reactive NST. Good FM. NST for GDM A2. NST reactive, no decels. Due to delay in radiology, pt waited 4 h to go down, missing lunch.  Her BPP per radiology was 4/

## 2018-08-30 ENCOUNTER — HOSPITAL ENCOUNTER (INPATIENT)
Dept: OBGYN CLINIC | Facility: HOSPITAL | Age: 33
Discharge: HOME | End: 2018-08-30
Attending: OBSTETRICS & GYNECOLOGY
Payer: MEDICAID

## 2018-08-30 ENCOUNTER — HOSPITAL ENCOUNTER (INPATIENT)
Facility: HOSPITAL | Age: 33
LOS: 2 days | Discharge: HOME | End: 2018-09-01
Attending: OBSTETRICS & GYNECOLOGY | Admitting: OBSTETRICS & GYNECOLOGY
Payer: MEDICAID

## 2018-08-30 PROCEDURE — 86780 TREPONEMA PALLIDUM: CPT | Performed by: OBSTETRICS & GYNECOLOGY

## 2018-08-30 PROCEDURE — 86850 RBC ANTIBODY SCREEN: CPT | Performed by: OBSTETRICS & GYNECOLOGY

## 2018-08-30 PROCEDURE — 82962 GLUCOSE BLOOD TEST: CPT

## 2018-08-30 PROCEDURE — 36415 COLL VENOUS BLD VENIPUNCTURE: CPT

## 2018-08-30 PROCEDURE — 3E033VJ INTRODUCTION OF OTHER HORMONE INTO PERIPHERAL VEIN, PERCUTANEOUS APPROACH: ICD-10-PCS | Performed by: OBSTETRICS & GYNECOLOGY

## 2018-08-30 PROCEDURE — 86901 BLOOD TYPING SEROLOGIC RH(D): CPT | Performed by: OBSTETRICS & GYNECOLOGY

## 2018-08-30 PROCEDURE — 86701 HIV-1ANTIBODY: CPT | Performed by: OBSTETRICS & GYNECOLOGY

## 2018-08-30 PROCEDURE — 86900 BLOOD TYPING SEROLOGIC ABO: CPT | Performed by: OBSTETRICS & GYNECOLOGY

## 2018-08-30 PROCEDURE — 10907ZC DRAINAGE OF AMNIOTIC FLUID, THERAPEUTIC FROM PRODUCTS OF CONCEPTION, VIA NATURAL OR ARTIFICIAL OPENING: ICD-10-PCS | Performed by: OBSTETRICS & GYNECOLOGY

## 2018-08-30 PROCEDURE — 87389 HIV-1 AG W/HIV-1&-2 AB AG IA: CPT | Performed by: OBSTETRICS & GYNECOLOGY

## 2018-08-30 PROCEDURE — 85027 COMPLETE CBC AUTOMATED: CPT | Performed by: OBSTETRICS & GYNECOLOGY

## 2018-08-30 RX ORDER — DEXTROSE, SODIUM CHLORIDE, SODIUM LACTATE, POTASSIUM CHLORIDE, AND CALCIUM CHLORIDE 5; .6; .31; .03; .02 G/100ML; G/100ML; G/100ML; G/100ML; G/100ML
125 INJECTION, SOLUTION INTRAVENOUS CONTINUOUS
Status: DISCONTINUED | OUTPATIENT
Start: 2018-08-30 | End: 2018-08-30 | Stop reason: HOSPADM

## 2018-08-30 RX ORDER — IBUPROFEN 600 MG/1
600 TABLET ORAL EVERY 6 HOURS
Status: DISCONTINUED | OUTPATIENT
Start: 2018-08-30 | End: 2018-09-01

## 2018-08-30 RX ORDER — DIAPER,BRIEF,INFANT-TODD,DISP
1 EACH MISCELLANEOUS EVERY 6 HOURS PRN
Status: DISCONTINUED | OUTPATIENT
Start: 2018-08-30 | End: 2018-09-01

## 2018-08-30 RX ORDER — DEXTROSE, SODIUM CHLORIDE, SODIUM LACTATE, POTASSIUM CHLORIDE, AND CALCIUM CHLORIDE 5; .6; .31; .03; .02 G/100ML; G/100ML; G/100ML; G/100ML; G/100ML
INJECTION, SOLUTION INTRAVENOUS CONTINUOUS
Status: DISCONTINUED | OUTPATIENT
Start: 2018-08-30 | End: 2018-09-01

## 2018-08-30 RX ORDER — LIDOCAINE HYDROCHLORIDE 10 MG/ML
30 INJECTION, SOLUTION EPIDURAL; INFILTRATION; INTRACAUDAL; PERINEURAL ONCE
Status: DISCONTINUED | OUTPATIENT
Start: 2018-08-30 | End: 2018-08-30 | Stop reason: HOSPADM

## 2018-08-30 RX ORDER — SIMETHICONE 80 MG
80 TABLET,CHEWABLE ORAL 3 TIMES DAILY PRN
Status: DISCONTINUED | OUTPATIENT
Start: 2018-08-30 | End: 2018-09-01

## 2018-08-30 RX ORDER — TRISODIUM CITRATE DIHYDRATE AND CITRIC ACID MONOHYDRATE 500; 334 MG/5ML; MG/5ML
30 SOLUTION ORAL AS NEEDED
Status: DISCONTINUED | OUTPATIENT
Start: 2018-08-30 | End: 2018-08-30 | Stop reason: HOSPADM

## 2018-08-30 RX ORDER — ONDANSETRON 2 MG/ML
4 INJECTION INTRAMUSCULAR; INTRAVENOUS EVERY 6 HOURS PRN
Status: DISCONTINUED | OUTPATIENT
Start: 2018-08-30 | End: 2018-09-01

## 2018-08-30 RX ORDER — AMMONIA INHALANTS 0.04 G/.3ML
0.3 INHALANT RESPIRATORY (INHALATION) AS NEEDED
Status: DISCONTINUED | OUTPATIENT
Start: 2018-08-30 | End: 2018-08-30 | Stop reason: HOSPADM

## 2018-08-30 RX ORDER — DOCUSATE SODIUM 100 MG/1
100 CAPSULE, LIQUID FILLED ORAL
Status: DISCONTINUED | OUTPATIENT
Start: 2018-08-30 | End: 2018-09-01

## 2018-08-30 RX ORDER — TERBUTALINE SULFATE 1 MG/ML
0.25 INJECTION, SOLUTION SUBCUTANEOUS AS NEEDED
Status: DISCONTINUED | OUTPATIENT
Start: 2018-08-30 | End: 2018-08-30 | Stop reason: HOSPADM

## 2018-08-30 RX ORDER — SODIUM CHLORIDE 0.9 % (FLUSH) 0.9 %
10 SYRINGE (ML) INJECTION AS NEEDED
Status: DISCONTINUED | OUTPATIENT
Start: 2018-08-30 | End: 2018-09-01

## 2018-08-30 RX ORDER — BISACODYL 10 MG
10 SUPPOSITORY, RECTAL RECTAL ONCE AS NEEDED
Status: DISCONTINUED | OUTPATIENT
Start: 2018-08-30 | End: 2018-09-01

## 2018-08-30 RX ORDER — SODIUM CHLORIDE 0.9 % (FLUSH) 0.9 %
10 SYRINGE (ML) INJECTION AS NEEDED
Status: DISCONTINUED | OUTPATIENT
Start: 2018-08-30 | End: 2018-08-30 | Stop reason: HOSPADM

## 2018-08-30 RX ORDER — AMMONIA INHALANTS 0.04 G/.3ML
0.3 INHALANT RESPIRATORY (INHALATION) AS NEEDED
Status: DISCONTINUED | OUTPATIENT
Start: 2018-08-30 | End: 2018-09-01

## 2018-08-30 RX ORDER — IBUPROFEN 600 MG/1
600 TABLET ORAL ONCE AS NEEDED
Status: DISCONTINUED | OUTPATIENT
Start: 2018-08-30 | End: 2018-08-30 | Stop reason: HOSPADM

## 2018-08-30 RX ORDER — PRENATAL VIT,CAL 76/IRON/FOLIC 29 MG-1 MG
1 TABLET ORAL DAILY
Status: DISCONTINUED | OUTPATIENT
Start: 2018-08-30 | End: 2018-09-01

## 2018-08-30 NOTE — DISCHARGE SUMMARY
[facility]    Discharge Summary    Yenifer Fan Patient Status:  Inpatient    1985 MRN [id number]   Location [address] [facility] Attending Golden Alanis MD   [facility] Day # 0       Admission Date: 2018  Jozef Carcamo

## 2018-08-30 NOTE — L&D DELIVERY NOTE
[facility]    Vaginal Delivery Note    Shean Stovall Patient Status:  Inpatient    1985 MRN [id number]   Location [address]  Attending Audra Matias MD   [facility] Day # 0 PCP Indio Juan.  DO Dakota Vazquez

## 2018-08-30 NOTE — H&P
[facility] Patient Status:  Inpatient    1985 MRN [id number]   Location 9 [facility] Attending Monika Parkinson MD   [facility] Day # 0 PCP Erick Gibbons.  Jen, DO     Date o For 1.00 Years     Types: Cigarettes    Quit date: 5/17/2014    Smokeless tobacco: Never Used    Alcohol use No    Comment: beer, occasionally       Home Meds:   Prescriptions Prior to Admission:  Insulin NPH, Human,, Isophane, 100 UNIT/ML Subcutaneous Drea Estimated fetal weight: 8lbs 8 oz    Extremities: Calves -- nontender, tr edema   Neuro: Patellar DTRs 2+   Cervix: Speculum exam: not done    SVE: 4/80/-2 per RN   Fetal Surveillance:  140 BPM baseline  Fetal heart variability: mod  Fetal Heart Rate de

## 2018-08-30 NOTE — PROGRESS NOTES
[facility]    Labor Progress Note    Glorious Hinders Patient Status:  Inpatient    1985 MRN [id number]   Location [address] Attending Inga Olmedo MD   [facility] Day # 0 PCP Cailin Navarro.  DO Jen       Sub

## 2018-08-31 PROCEDURE — 85025 COMPLETE CBC W/AUTO DIFF WBC: CPT | Performed by: OBSTETRICS & GYNECOLOGY

## 2018-08-31 RX ORDER — SODIUM CHLORIDE, SODIUM LACTATE, POTASSIUM CHLORIDE, CALCIUM CHLORIDE 600; 310; 30; 20 MG/100ML; MG/100ML; MG/100ML; MG/100ML
INJECTION, SOLUTION INTRAVENOUS
Status: DISPENSED
Start: 2018-08-31 | End: 2018-09-01

## 2018-08-31 NOTE — PAYOR COMM NOTE
--------------  ADMISSION REVIEW     Payor: Leonel Paniagua #:  [id number]  Authorization Number: [id number]    Admit date: 8/30/18  Admit time: 9628       Admitting Physician: Dinorah Wilder MD  Attending Physician: 1 Term 10/02/02 40w0d  8 lb 4 oz (3.742 kg) M NORMAL SPONT EPI N BECCA      Past Gyn History:  Reg menses q 30d, Last pap 9/16, no abl, no STDs  Past Medical History:   Past Medical History:   Diagnosis Date   • Gestational diabetes 04/2017   • Obesity, Clas Lancets (BD LANCET ULTRAFINE 33G) Does not apply Misc 1 lancet by other route 4 (four) times daily. Disp: 100 each Rfl: 2 Taking   Acetone, Urine, Test (KETOSTIX) In Vitro Strip Test first morning urine sample once daily.  Disp: 50 strip Rfl: 1 Taking   [place] Short interpreg interval         Pitocin IOL. Follow BS q 4 hrs or prn    Risks, benefits, alternatives and possible complications have been discussed in detail with the patient.  All questions answered, all appropriate consents will be signed at the [facility] Apgars:  1 minute:  8               5 minutes:   9                       10 minutes:       Presentation       Position           Delivery Narrative:     Patient pushed for < 5  minutes prior to delivering a live male infant over intact perineum in [facility]

## 2018-08-31 NOTE — LACTATION NOTE
History of Present Illness  The patient is a 58 year old female who presents today removal of cervical polyp noted at annual gyn exam with her primary care physician.  History:    Past Medical History:   Diagnosis Date   • Alcohol abuse, in remission     sober since    • Asthma    • C. difficile diarrhea 2015    after taking Zithromax   • Depression    • Graves disease    • Insulin resistance    • Multiple thyroid nodules     (2) right. Benign colloid nodule   • Tubular adenoma of colon 2016   • Ulcerative colitis 2016       Family History   Problem Relation Age of Onset   • Depression Mother    • COPD Mother    • Diabetes Father    • Depression Father    • Alcohol/Drug Sister    • Alcohol/Drug Sister    • Depression Sister    • Mult Sclerosis Sister    • Alcohol/Drug Sister    • Depression Sister    • Alcohol/Drug Brother    • Depression Brother    • Heart disease Brother      PTCA   • Stroke Brother      TIA        Past Surgical History:   Procedure Laterality Date   • COLONOSCOPY  2010   • COLONOSCOPY DIAGNOSTIC  2016    Moderate to severe segmental colitis , 1 cm right tubular adenoma, Dr. Zane Alvares   • COLPOSCOPY  2010    LSIL   • COLPOSCOPY  2009   • DEXA BONE DENSITY AXIAL SKELETON  2002   • KNEE ARTHROSCOPY W/ MENISCECTOMY  2014    Berry-WSC-Right   • KNEE SCOPE,DIAGNOSTIC  2012    left knee arthroscopy   • NEVUS EXCISION  2001    right forearm, benign   • REMOVAL OF TONSILS,12+ Y/O  18 years old       OB History    Para Term  AB SAB TAB Ectopic Multiple Living   0 0 0 0 0 0 0 0 0 0             Current Outpatient Prescriptions   Medication Sig Dispense Refill   • meloxicam (MOBIC) 15 MG tablet Take 1 tablet by mouth daily. 60 tablet 2   • traMADol (ULTRAM) 50 MG tablet TAKE 1 TO 2 TABLETS BY MOUTH EVERY 6 HOURS AS NEEDED FOR PAIN 540 tablet 1   • albuterol 108 (90 Base) MCG/ACT inhaler Inhale 2 puffs into the lungs  This note was copied from a baby's chart. Addressed mom's concerns regarding low milk supply. Observed mom hand express colostrum from both breasts. Reviewed handouts and normal  feeding behavior.  Encouraged mom to make an outpatient appointment a every 4 hours as needed for Shortness of Breath or Wheezing. 1 Inhaler 0   • buPROPion (WELLBUTRIN SR) 150 MG 12 hr tablet TAKE 1 TABLET BY MOUTH EVERY MORNING. 90 tablet 0   • VIIBRYD 40 MG Tab TAKE 1 TABLET BY MOUTH DAILY. 90 tablet 3   • azaTHIOprine (IMURAN) 50 MG tablet Take 1 tablet by mouth daily. 90 tablet 3   • ADAlimumab (HUMIRA) 40 MG/0.8ML injection Inject 0.8 mLs into the skin every 14 days. 20 kit 10   • Glucosamine-Chondroitin (GLUCOSAMINE CHONDR COMPLEX) 500-400 MG CAPS Take 2 capsules by mouth daily.     • Omega-3 Fatty Acids (FISH OIL PO) Take 1 capsule by mouth daily.     • Multiple Vitamins-Minerals (MULTIVITAMIN PO) Take 1 tablet by mouth daily.     • zolpidem (AMBIEN) 10 MG tablet TAKE 1 TABLET BY MOUTH AT BEDTIME AS NEEDED 90 tablet 1     No current facility-administered medications for this visit.        ALLERGIES:   Allergen Reactions   • Amoxicillin DIARRHEA       Social History     Social History   • Marital status:      Spouse name: N/A   • Number of children: 0   • Years of education: N/A     Occupational History   •  [employer]     Social History Main Topics   • Smoking status: Former Smoker     Quit date: 1/1/2001   • Smokeless tobacco: Never Used   • Alcohol use No   • Drug use: No   • Sexual activity: Not on file     Other Topics Concern   • Not on file     Social History Narrative         Health Maintenance Summary     Topic Due On Due Status Completed On    MAMMOGRAM - BREAST CANCER SCREENING Feb 6, 2019 Not Due Feb 6, 2017    Colorectal Cancer Screening - Colonoscopy Jun 21, 2026 Not Due Jun 21, 2016    Pap Smear - Cervical Cancer Screening  Mar 2, 2022 Not Due Mar 2, 2017    Immunization - TDAP Pregnancy  Hidden     IMMUNIZATION - DTaP/Tdap/Td Sep 17, 2022 Not Due Sep 17, 2012    Immunization-Influenza  Completed Oct 28, 2016          ROS:    Visit Vitals   • /80   • Pulse 77   • Wt 89.2 kg   • BMI 32.72 kg/m2       Physical Exam:     Vital  Signs:   Visit Vitals   • /80   • Pulse 77   • Wt 89.2 kg   • BMI 32.72 kg/m2      General Appearance: Well groomed.   Neuropsychiatric: Mood and affect appropriate.    Pelvic:  External genitalia:  Normal.  Vagina: Normal appearance.  No discharge.   Cervix: Normal appearance.  No discharge. Small cervical polyp  Uterus: normal  Adnexa: No masses or tenderness.     Assessments & Plan:  Cervical polyp  Cervical polypectomy done  Patient tolerated procedure well

## 2018-08-31 NOTE — PROGRESS NOTES
Post-Partum Note   8/31/2018, 8:27 AM    Subjective:  PPD #1  No complaints. Lochia normal. Voiding normal. Not dizzy. Mood good.        Objective:   08/30/18  1900 08/30/18  2100 08/31/18  0105 08/31/18  0530   BP: 138/77 137/79 120/74 117/67   BP Location

## 2018-08-31 NOTE — LACTATION NOTE
LACTATION NOTE - MOTHER      Evaluation Type: Outpatient Initial    Problems identified  Problems identified: Knowledge deficit;Previous breast surgery  Previous breast surgery: Augmentation  Problems Identified Other: assymmetry prior to breast surgery, p

## 2018-09-01 VITALS
HEART RATE: 81 BPM | HEIGHT: 64 IN | DIASTOLIC BLOOD PRESSURE: 82 MMHG | BODY MASS INDEX: 40.46 KG/M2 | TEMPERATURE: 98 F | SYSTOLIC BLOOD PRESSURE: 120 MMHG | WEIGHT: 237 LBS | RESPIRATION RATE: 14 BRPM

## 2018-09-01 NOTE — PROGRESS NOTES
Post-Partum Note   9/1/2018, 9:01 AM    Subjective:  PPD 2  No complaints. Lochia normal. Voiding normal. Not dizzy. Mood good.    Breast feeding    Objective:   08/31/18  0530 08/31/18  0941 08/31/18  1636 08/31/18  2356   BP: 117/67 120/69 120/74 128/79

## 2018-09-01 NOTE — PROGRESS NOTES
Sat with patient to review plan of care. Discussed analgesic options and anticipated discharge. Answered all questions. VSS. Formula feeding and breastfeeding. Pumping reviewed. Answered all questions. Voiding independently. Lochia is WNL. Uterus is firm.

## 2019-01-12 ENCOUNTER — OFFICE VISIT (OUTPATIENT)
Dept: FAMILY MEDICINE CLINIC | Facility: CLINIC | Age: 34
End: 2019-01-12
Payer: MEDICAID

## 2019-01-12 ENCOUNTER — APPOINTMENT (OUTPATIENT)
Dept: LAB | Age: 34
End: 2019-01-12
Attending: FAMILY MEDICINE
Payer: MEDICAID

## 2019-01-12 VITALS
BODY MASS INDEX: 36.11 KG/M2 | HEART RATE: 77 BPM | DIASTOLIC BLOOD PRESSURE: 73 MMHG | WEIGHT: 211.5 LBS | SYSTOLIC BLOOD PRESSURE: 111 MMHG | HEIGHT: 64 IN

## 2019-01-12 DIAGNOSIS — L63.9 ALOPECIA AREATA: Primary | ICD-10-CM

## 2019-01-12 DIAGNOSIS — L63.9 ALOPECIA AREATA: ICD-10-CM

## 2019-01-12 LAB
CHOLEST SERPL-MCNC: 157 MG/DL (ref 110–200)
FERRITIN SERPL IA-MCNC: 32 NG/ML (ref 11–307)
GLUCOSE SERPL-MCNC: 103 MG/DL (ref 70–99)
HCG SERPL QL: NEGATIVE
HDLC SERPL-MCNC: 58 MG/DL
LDLC SERPL CALC-MCNC: 84 MG/DL (ref 0–99)
NONHDLC SERPL-MCNC: 99 MG/DL
TRIGL SERPL-MCNC: 73 MG/DL (ref 1–149)
TSH SERPL-ACNC: 1.81 UIU/ML (ref 0.45–5.33)

## 2019-01-12 PROCEDURE — 82728 ASSAY OF FERRITIN: CPT

## 2019-01-12 PROCEDURE — 99214 OFFICE O/P EST MOD 30 MIN: CPT | Performed by: FAMILY MEDICINE

## 2019-01-12 PROCEDURE — 36415 COLL VENOUS BLD VENIPUNCTURE: CPT

## 2019-01-12 PROCEDURE — 82947 ASSAY GLUCOSE BLOOD QUANT: CPT

## 2019-01-12 PROCEDURE — 84443 ASSAY THYROID STIM HORMONE: CPT

## 2019-01-12 PROCEDURE — 84703 CHORIONIC GONADOTROPIN ASSAY: CPT

## 2019-01-12 PROCEDURE — 99212 OFFICE O/P EST SF 10 MIN: CPT | Performed by: FAMILY MEDICINE

## 2019-01-12 PROCEDURE — 80061 LIPID PANEL: CPT

## 2019-01-12 RX ORDER — METRONIDAZOLE 10 MG/G
1 GEL TOPICAL 2 TIMES DAILY
Qty: 60 G | Refills: 1 | Status: SHIPPED | OUTPATIENT
Start: 2019-01-12 | End: 2019-05-12

## 2019-01-12 RX ORDER — PHENTERMINE HYDROCHLORIDE 37.5 MG/1
37.5 TABLET ORAL
Qty: 30 TABLET | Refills: 0 | Status: SHIPPED | OUTPATIENT
Start: 2019-01-12 | End: 2019-02-09

## 2019-01-12 NOTE — PATIENT INSTRUCTIONS
myfitnesspal    <100gm carbs or less daily not including fruits and vegetables    Breakfast 1 hardboiled egg one slice of toast with cream cheese

## 2019-01-12 NOTE — PROGRESS NOTES
Blood pressure 111/73, pulse 77, height 5' 4\" (1.626 m), weight 211 lb 8 oz (95.9 kg), currently breastfeeding. Patient presents today complaining of a rash on her face. She has had this since her last pregnancy.   Also complaining of some hair loss in

## 2019-01-15 ENCOUNTER — TELEPHONE (OUTPATIENT)
Dept: FAMILY MEDICINE CLINIC | Facility: CLINIC | Age: 34
End: 2019-01-15

## 2019-01-15 NOTE — TELEPHONE ENCOUNTER
Per pharmacy plan does not cover Metronidazole 1% topical gel tube. Please call plan at [phone] to initiate PA or call/fax pharmacy to change medication.  Patient ID# [id number]

## 2019-01-16 NOTE — TELEPHONE ENCOUNTER
PA for Metronidazole 1% external gel completed with [employer] via CMM response time 3-5 business days [facility].

## 2019-02-09 ENCOUNTER — OFFICE VISIT (OUTPATIENT)
Dept: FAMILY MEDICINE CLINIC | Facility: CLINIC | Age: 34
End: 2019-02-09
Payer: MEDICAID

## 2019-02-09 VITALS
SYSTOLIC BLOOD PRESSURE: 125 MMHG | DIASTOLIC BLOOD PRESSURE: 84 MMHG | RESPIRATION RATE: 18 BRPM | TEMPERATURE: 98 F | HEIGHT: 64 IN | HEART RATE: 88 BPM | WEIGHT: 205 LBS | BODY MASS INDEX: 35 KG/M2

## 2019-02-09 DIAGNOSIS — L63.9 ALOPECIA AREATA: Primary | ICD-10-CM

## 2019-02-09 DIAGNOSIS — R73.01 IMPAIRED FASTING GLUCOSE: ICD-10-CM

## 2019-02-09 PROCEDURE — 99213 OFFICE O/P EST LOW 20 MIN: CPT | Performed by: FAMILY MEDICINE

## 2019-02-09 PROCEDURE — 99212 OFFICE O/P EST SF 10 MIN: CPT | Performed by: FAMILY MEDICINE

## 2019-02-09 RX ORDER — PHENTERMINE HYDROCHLORIDE 37.5 MG/1
37.5 TABLET ORAL
Qty: 30 TABLET | Refills: 2 | Status: SHIPPED | OUTPATIENT
Start: 2019-02-09 | End: 2019-12-05

## 2019-02-09 NOTE — PROGRESS NOTES
Blood pressure 125/84, pulse 88, temperature 98.1 °F (36.7 °C), temperature source Oral, resp. rate 18, height 5' 4\" (1.626 m), weight 205 lb (93 kg), currently breastfeeding.     Patient presents today following up for outpatient with obesity and acne ros

## 2019-02-23 ENCOUNTER — OFFICE VISIT (OUTPATIENT)
Dept: DERMATOLOGY CLINIC | Facility: CLINIC | Age: 34
End: 2019-02-23
Payer: MEDICAID

## 2019-02-23 DIAGNOSIS — B36.0 TINEA VERSICOLOR: ICD-10-CM

## 2019-02-23 DIAGNOSIS — L57.0 AK (ACTINIC KERATOSIS): ICD-10-CM

## 2019-02-23 DIAGNOSIS — L71.9 ROSACEA: Primary | ICD-10-CM

## 2019-02-23 PROCEDURE — 99213 OFFICE O/P EST LOW 20 MIN: CPT | Performed by: DERMATOLOGY

## 2019-02-23 PROCEDURE — 99212 OFFICE O/P EST SF 10 MIN: CPT | Performed by: DERMATOLOGY

## 2019-02-23 RX ORDER — DOXYCYCLINE HYCLATE 50 MG/1
50 CAPSULE ORAL DAILY
Qty: 30 CAPSULE | Refills: 3 | Status: SHIPPED | OUTPATIENT
Start: 2019-02-23 | End: 2019-12-05

## 2019-02-23 RX ORDER — ACETAMINOPHEN AND CODEINE PHOSPHATE 120; 12 MG/5ML; MG/5ML
SOLUTION ORAL
Refills: 12 | COMMUNITY
Start: 2019-02-12 | End: 2019-12-05

## 2019-02-23 RX ORDER — MOMETASONE FUROATE 1 MG/ML
SOLUTION TOPICAL
Qty: 50 ML | Refills: 3 | Status: SHIPPED | OUTPATIENT
Start: 2019-02-23 | End: 2019-12-05

## 2019-02-28 ENCOUNTER — TELEPHONE (OUTPATIENT)
Dept: DERMATOLOGY CLINIC | Facility: CLINIC | Age: 34
End: 2019-02-28

## 2019-02-28 NOTE — TELEPHONE ENCOUNTER
PA needed for metronidazole cream 0.75% cream        [phone]  ID- [id number]    Per KMT- (message on YuanV message encounter)   Yes proceed with pa--for inflammatory rosacea. I have absolutely no clue how long it will take to hear about this.   Pt w

## 2019-03-04 NOTE — PROGRESS NOTES
Maru Valente is a 28year old female.     Patient presents with:  Rosacea: LOV 09/02/17 pt seen for facial rash while pregnant, pt here for eval of possible rosacea pt was given Metronidazole gel for treatment by her PCP due to insurance issues hasnt scalp Disp: 50 mL Rfl: 3   Phentermine HCl 37.5 MG Oral Tab Take 1 tablet (37.5 mg total) by mouth every morning before breakfast. Disp: 30 tablet Rfl: 2   MetroNIDAZOLE 1 % External Gel Apply 1 Application topically 2 (two) times daily.  Disp: 60 g Rfl: 1 abused: Not on file        Physically abused: Not on file        Forced sexual activity: Not on file    Other Topics      Concerns:         Service: Not Asked        Blood Transfusions: Not Asked        Caffeine Concern: Yes          tea        Occ Patient  well-developed well-nourished, alert oriented in no acute distress.   Exam of involved, appropriate areas of skin performed, including scalp, head, neck, face,nails, hair, external eyes, including conjunctival mucosa, eyelids, lips, external ears, contributory discussed. Appears to be familial as well. Lactic acid. Pathophysiology discussed with patient. Therapeutic options reviewed. See  Medications in grid. Instructions reviewed at length. General skin care questions answered.    Surinder Kang

## 2019-03-13 NOTE — TELEPHONE ENCOUNTER
PA form completed and faxed to [facility]. Fax [phone]  Phone:[phone]    Forms placed in PA book.

## 2019-06-14 NOTE — TELEPHONE ENCOUNTER
First trimester screen results reviewed by Dr. Terrance Pino  Down Syndrome risk 1 in 7193  Trisomy 13,18 risk of 1 in > 10,000  [address] contacted by phone to review results. She verbalized understanding.   Report will be scanned into Epic and copy faxed to
Skin normal color for race, warm, dry and intact. No evidence of rash.

## 2019-11-23 ENCOUNTER — PATIENT MESSAGE (OUTPATIENT)
Dept: DERMATOLOGY CLINIC | Facility: CLINIC | Age: 34
End: 2019-11-23

## 2019-12-05 ENCOUNTER — OFFICE VISIT (OUTPATIENT)
Dept: OBGYN CLINIC | Facility: CLINIC | Age: 34
End: 2019-12-05

## 2019-12-05 VITALS
WEIGHT: 213 LBS | SYSTOLIC BLOOD PRESSURE: 105 MMHG | DIASTOLIC BLOOD PRESSURE: 73 MMHG | HEART RATE: 86 BPM | BODY MASS INDEX: 37 KG/M2

## 2019-12-05 DIAGNOSIS — Z01.419 ENCOUNTER FOR WELL WOMAN EXAM WITH ROUTINE GYNECOLOGICAL EXAM: Primary | ICD-10-CM

## 2019-12-05 PROBLEM — O24.414 INSULIN CONTROLLED WHITE CLASSIFICATION A2 GESTATIONAL DIABETES MELLITUS (GDM): Status: RESOLVED | Noted: 2017-06-01 | Resolved: 2019-12-05

## 2019-12-05 PROBLEM — R51.9 HEADACHE IN PREGNANCY: Status: RESOLVED | Noted: 2017-08-07 | Resolved: 2019-12-05

## 2019-12-05 PROBLEM — O24.319 MODIFIED WHITE CLASS B PREGESTATIONAL DIABETES MELLITUS: Status: RESOLVED | Noted: 2017-04-07 | Resolved: 2019-12-05

## 2019-12-05 PROBLEM — Z34.90 PREGNANCY (HCC): Status: RESOLVED | Noted: 2018-08-09 | Resolved: 2019-12-05

## 2019-12-05 PROBLEM — O26.899 HEADACHE IN PREGNANCY: Status: RESOLVED | Noted: 2017-08-07 | Resolved: 2019-12-05

## 2019-12-05 PROBLEM — O24.319 MODIFIED WHITE CLASS B PREGESTATIONAL DIABETES MELLITUS (HCC): Status: RESOLVED | Noted: 2017-04-07 | Resolved: 2019-12-05

## 2019-12-05 PROBLEM — O24.410 DIET CONTROLLED GESTATIONAL DIABETES MELLITUS (GDM) IN THIRD TRIMESTER: Status: RESOLVED | Noted: 2018-06-08 | Resolved: 2019-12-05

## 2019-12-05 PROBLEM — Z34.90 PREGNANCY: Status: RESOLVED | Noted: 2018-08-09 | Resolved: 2019-12-05

## 2019-12-05 PROBLEM — Z34.90 SUPERVISION OF NORMAL PREGNANCY: Status: RESOLVED | Noted: 2017-10-05 | Resolved: 2019-12-05

## 2019-12-05 PROBLEM — R51.9 HEADACHE IN PREGNANCY (HCC): Status: RESOLVED | Noted: 2017-08-07 | Resolved: 2019-12-05

## 2019-12-05 PROBLEM — O24.410 DIET CONTROLLED GESTATIONAL DIABETES MELLITUS (GDM) IN THIRD TRIMESTER (HCC): Status: RESOLVED | Noted: 2018-06-08 | Resolved: 2019-12-05

## 2019-12-05 PROBLEM — N92.0 SPOTTING: Status: RESOLVED | Noted: 2017-09-18 | Resolved: 2019-12-05

## 2019-12-05 PROBLEM — O24.414 INSULIN CONTROLLED WHITE CLASSIFICATION A2 GESTATIONAL DIABETES MELLITUS (GDM) (HCC): Status: RESOLVED | Noted: 2017-06-01 | Resolved: 2019-12-05

## 2019-12-05 PROBLEM — O26.899 HEADACHE IN PREGNANCY (HCC): Status: RESOLVED | Noted: 2017-08-07 | Resolved: 2019-12-05

## 2019-12-05 PROBLEM — Z34.90 SUPERVISION OF NORMAL PREGNANCY (HCC): Status: RESOLVED | Noted: 2017-10-05 | Resolved: 2019-12-05

## 2019-12-05 PROCEDURE — 99395 PREV VISIT EST AGE 18-39: CPT | Performed by: OBSTETRICS & GYNECOLOGY

## 2019-12-05 NOTE — PROGRESS NOTES
HPI:    Patient ID: Jeffy Toussaint is a 27year old year old female. HPI  Well woman  No c/o. Interested in birth control. Currently on ocp. Interested in IUD. Menses regular, monthly, 4 days, not heavy or painful. Provided with literature.   Disc routine gynecological exam  (primary encounter diagnosis)  Plan: THINPREP PAP SMEAR B, HPV HIGH RISK , THIN PREP        COLLECTION        Normal exam.  Pap test/HPV every 3 years when previous normal/-HPV.   Monthly self breast exam.  First mammogram at age

## 2019-12-11 ENCOUNTER — TELEPHONE (OUTPATIENT)
Dept: OBGYN CLINIC | Facility: CLINIC | Age: 34
End: 2019-12-11

## 2019-12-11 NOTE — TELEPHONE ENCOUNTER
MyChart message sent to pt.      ----- Message from Junaid Alexis MD sent at 12/11/2019 10:00 AM CST -----  Please notify by MyChart or letter of normal Pap test and normal HPV cotesting.

## 2019-12-28 ENCOUNTER — PATIENT MESSAGE (OUTPATIENT)
Dept: OBGYN CLINIC | Facility: CLINIC | Age: 34
End: 2019-12-28

## 2019-12-28 RX ORDER — ACETAMINOPHEN AND CODEINE PHOSPHATE 120; 12 MG/5ML; MG/5ML
0.35 SOLUTION ORAL DAILY
Qty: 3 PACKAGE | Refills: 3 | Status: SHIPPED | OUTPATIENT
Start: 2019-12-28 | End: 2020-01-25 | Stop reason: WASHOUT

## 2019-12-28 NOTE — TELEPHONE ENCOUNTER
Quintin Joiner RN 12/28/2019 10:16 AM CST      ----- Message -----  From: Gail Boyer  Sent: 12/28/2019 10:08 AM CST  To: Brandi [facility] Clinical Staff  Subject: Prescription Question     I was just seen in the office for my annual pap.  I was to lesly

## 2019-12-28 NOTE — TELEPHONE ENCOUNTER
Pt requesting Norethindrone 0.35 mg po daily, 90 day supply w 3 refills. Order placed in [facility] [address]. Advised pt to schedule follow up appointment in 3 months. Pt voices understanding.

## 2019-12-28 NOTE — TELEPHONE ENCOUNTER
Can refill the OCP she was on last year Norethindrone 0.35 mg po daily, 90 day supply w 3 refills. This is a progesterone-only minipill as was prescribed by the [facility] ob/gyne group she was with last year.     Alternatively, if she wishes standard OCP for cyc

## 2020-06-26 DIAGNOSIS — Z78.9 PARTICIPANT IN HEALTH AND WELLNESS PLAN: Primary | ICD-10-CM

## 2020-07-02 ENCOUNTER — NURSE ONLY (OUTPATIENT)
Dept: LAB | Age: 35
End: 2020-07-02
Attending: PREVENTIVE MEDICINE

## 2020-07-02 DIAGNOSIS — Z78.9 PARTICIPANT IN HEALTH AND WELLNESS PLAN: ICD-10-CM

## 2020-07-02 PROCEDURE — 86769 SARS-COV-2 COVID-19 ANTIBODY: CPT

## 2020-07-06 LAB — SARS-COV-2 IGG SERPLBLD QL IA.RAPID: NEGATIVE

## 2020-08-11 ENCOUNTER — LAB ENCOUNTER (OUTPATIENT)
Dept: LAB | Facility: HOSPITAL | Age: 35
End: 2020-08-11
Attending: PREVENTIVE MEDICINE
Payer: MEDICAID

## 2020-08-11 ENCOUNTER — TELEPHONE (OUTPATIENT)
Dept: INTERNAL MEDICINE CLINIC | Facility: HOSPITAL | Age: 35
End: 2020-08-11

## 2020-08-11 DIAGNOSIS — Z20.822 SUSPECTED COVID-19 VIRUS INFECTION: Primary | ICD-10-CM

## 2020-08-11 DIAGNOSIS — Z20.822 SUSPECTED COVID-19 VIRUS INFECTION: ICD-10-CM

## 2020-08-11 LAB — SARS-COV-2 RNA RESP QL NAA+PROBE: NOT DETECTED

## 2020-09-08 NOTE — NST
Nonstress Test   Patient: Lincoln Yeager    Gestation: 32w0d    NST: Class B DM, high BMI       Variability: Moderate           Accelerations: Yes           Decelerations: None            Baseline: 135 BPM           Uterine Irritability: No           Con
alert and awake

## 2020-10-12 PROBLEM — F43.9 STRESS: Status: ACTIVE | Noted: 2017-01-01

## 2020-10-12 PROBLEM — E66.813 OBESITY, CLASS III, BMI 40-49.9 (MORBID OBESITY): Status: ACTIVE | Noted: 2017-02-22

## 2020-10-12 PROBLEM — R63.5 WEIGHT GAIN: Status: ACTIVE | Noted: 2017-01-01

## 2020-10-12 PROBLEM — E66.01 OBESITY, CLASS III, BMI 40-49.9 (MORBID OBESITY) (HCC): Status: ACTIVE | Noted: 2017-02-22

## 2020-10-12 NOTE — PROGRESS NOTES
The Wellness and Weight Loss Consultation Note       Patient:  Nzaia Siu  :      1985  MRN:      [id number]    Referring Provider: Dr. Joao Fish       Chief Complaint:  Patient presents with:  Consult  Weight Management      SUBJECTIVE SpO2 98%   BMI 40.11 kg/m²      Patient Medications:    Current Outpatient Medications   Medication Sig Dispense Refill   • Norethindrone 0.35 MG Oral Tab      • Phentermine HCl 37.5 MG Oral Tab Take 1 tablet (37.5 mg total) by mouth every morning before b file        Physically abused: Not on file        Forced sexual activity: Not on file    Other Topics      Concerns:         Service: Not Asked        Blood Transfusions: Not Asked        Caffeine Concern: Yes          tea        Occupational Expos food journal, No drinking 30 minutes before or after meals, Utilize portion control strategies to reduce calorie intake, Identify triggers for eating and manage cues and Eat slowly and take 20 to 30 minutes to complete each meal      ROS:  Constitutional: and vegetable servings to 5-6 per day.       PHENTERMINE: Since the patient would like to try phentermine, and is aware of the potential side effects (hypertension, palpitations, tachycardia, and anxiety), I will give Maru Valente a prescription today t

## 2020-11-11 ENCOUNTER — OFFICE VISIT (OUTPATIENT)
Dept: SURGERY | Facility: CLINIC | Age: 35
End: 2020-11-11
Payer: MEDICAID

## 2020-11-11 VITALS
WEIGHT: 223 LBS | HEART RATE: 96 BPM | SYSTOLIC BLOOD PRESSURE: 110 MMHG | DIASTOLIC BLOOD PRESSURE: 62 MMHG | BODY MASS INDEX: 38.07 KG/M2 | OXYGEN SATURATION: 98 % | HEIGHT: 64 IN

## 2020-11-11 DIAGNOSIS — E66.9 OBESITY (BMI 30-39.9): ICD-10-CM

## 2020-11-11 DIAGNOSIS — F43.9 STRESS: ICD-10-CM

## 2020-11-11 DIAGNOSIS — Z51.81 ENCOUNTER FOR THERAPEUTIC DRUG MONITORING: Primary | ICD-10-CM

## 2020-11-11 PROCEDURE — 99214 OFFICE O/P EST MOD 30 MIN: CPT | Performed by: NURSE PRACTITIONER

## 2020-11-11 PROCEDURE — 3074F SYST BP LT 130 MM HG: CPT | Performed by: NURSE PRACTITIONER

## 2020-11-11 PROCEDURE — 3078F DIAST BP <80 MM HG: CPT | Performed by: NURSE PRACTITIONER

## 2020-11-11 PROCEDURE — 3008F BODY MASS INDEX DOCD: CPT | Performed by: NURSE PRACTITIONER

## 2020-11-11 NOTE — PROGRESS NOTES
[address]  Dept: [phone]       Patient:  Mary Casey  :      1985  MRN:      [id number]    Chief Complaint:  Patient pr Obesity, Class III, BMI 40-49.9 (morbid obesity) ([facility] 2017        Comorbidities:  None     OBJECTIVE     Vitals: /62   Pulse 96   Ht 5' 4\" (1.626 m)   Wt 223 lb (101.2 kg)   LMP 11/23/2019   SpO2 98%   BMI 38.28 kg/m²     Initial weight loss: -10 Gets together: Not on file        Attends Bahai service: Not on file        Active member of club or organization: Not on file        Attends meetings of clubs or organizations: Not on file        Relationship status: Not on file      Intimate partne day  · Length of time it takes to consume a meal:   · # of snacks per day: 0-2   · Type of snacks:  Naked bars   · Amount of soda consumption per day:  None   · Amount of water (in ounces) per day:  Not adequate  · Toughest challenge:    · Sleep: 8 hours Encounter for therapeutic drug monitoring  (primary encounter diagnosis)  Stress  Obesity (bmi 30-39. 9)    PLAN     Given the patient's age, degree of obesity and multiple medical problems outlined above, I do believe that bariatric surgery may prove alfred

## 2020-11-11 NOTE — PATIENT INSTRUCTIONS
Nutritional Goals  Calorie-controlled diet:  1500, Limit carbohydrates to 100-130 g gms per day, Eat 100-200 calories within 1 hour of waking  and Eat 3-4 cups of fresh fruits or vegetables daily    Fooducate foods with labels.

## 2020-11-24 ENCOUNTER — OFFICE VISIT (OUTPATIENT)
Dept: FAMILY MEDICINE CLINIC | Facility: CLINIC | Age: 35
End: 2020-11-24

## 2020-11-24 VITALS
WEIGHT: 226 LBS | RESPIRATION RATE: 16 BRPM | SYSTOLIC BLOOD PRESSURE: 115 MMHG | DIASTOLIC BLOOD PRESSURE: 75 MMHG | BODY MASS INDEX: 38.58 KG/M2 | HEART RATE: 98 BPM | HEIGHT: 64 IN

## 2020-11-24 DIAGNOSIS — M54.9 UPPER BACK PAIN: ICD-10-CM

## 2020-11-24 DIAGNOSIS — Z00.00 ROUTINE GENERAL MEDICAL EXAMINATION AT A HEALTH CARE FACILITY: Primary | ICD-10-CM

## 2020-11-24 DIAGNOSIS — E66.9 CLASS 2 OBESITY WITHOUT SERIOUS COMORBIDITY WITH BODY MASS INDEX (BMI) OF 35.0 TO 35.9 IN ADULT, UNSPECIFIED OBESITY TYPE: ICD-10-CM

## 2020-11-24 PROBLEM — N20.1 URETEROLITHIASIS: Status: RESOLVED | Noted: 2018-03-22 | Resolved: 2020-11-24

## 2020-11-24 PROBLEM — R73.01 IMPAIRED FASTING GLUCOSE: Status: RESOLVED | Noted: 2017-11-21 | Resolved: 2020-11-24

## 2020-11-24 PROBLEM — Z51.81 ENCOUNTER FOR THERAPEUTIC DRUG MONITORING: Status: RESOLVED | Noted: 2019-12-05 | Resolved: 2020-11-24

## 2020-11-24 PROCEDURE — 3078F DIAST BP <80 MM HG: CPT | Performed by: PHYSICIAN ASSISTANT

## 2020-11-24 PROCEDURE — 99385 PREV VISIT NEW AGE 18-39: CPT | Performed by: PHYSICIAN ASSISTANT

## 2020-11-24 PROCEDURE — 3074F SYST BP LT 130 MM HG: CPT | Performed by: PHYSICIAN ASSISTANT

## 2020-11-24 PROCEDURE — 3008F BODY MASS INDEX DOCD: CPT | Performed by: PHYSICIAN ASSISTANT

## 2020-11-24 NOTE — PROGRESS NOTES
HPI:    Patient ID: Omar Mo is a 38year old female. Patient is here for routine physical exam. Acute issues listed below. No significant chronic medical problems. Patient is requesting testing. Diet and exercise have been fair.  Past medical hi /75 (BP Location: Left arm, Patient Position: Sitting, Cuff Size: large)   Pulse 98   Resp 16   Ht 5' 4\" (1.626 m)   Wt 226 lb (102.5 kg)   LMP 11/02/2020 (Exact Date)   BMI 38.79 kg/m²   Body mass index is 38.79 kg/m².   PHYSICAL EXAM:   Physical Ex Neurological: She is alert and oriented to person, place, and time. She displays normal reflexes. No sensory deficit or motor deficit. Gait normal.   Skin: Skin is warm and dry. ASSESSMENT/PLAN:   1.  Routine general medical examination at a he

## 2020-11-25 ENCOUNTER — APPOINTMENT (OUTPATIENT)
Dept: LAB | Age: 35
End: 2020-11-25
Attending: PHYSICIAN ASSISTANT
Payer: MEDICAID

## 2020-11-25 ENCOUNTER — EKG ENCOUNTER (OUTPATIENT)
Dept: LAB | Age: 35
End: 2020-11-25
Attending: INTERNAL MEDICINE
Payer: MEDICAID

## 2020-11-25 DIAGNOSIS — R63.5 WEIGHT GAIN: ICD-10-CM

## 2020-11-25 DIAGNOSIS — F43.9 STRESS: ICD-10-CM

## 2020-11-25 DIAGNOSIS — E66.01 OBESITY, CLASS III, BMI 40-49.9 (MORBID OBESITY) (HCC): ICD-10-CM

## 2020-11-25 DIAGNOSIS — Z00.00 ROUTINE GENERAL MEDICAL EXAMINATION AT A HEALTH CARE FACILITY: ICD-10-CM

## 2020-11-25 PROCEDURE — 84443 ASSAY THYROID STIM HORMONE: CPT | Performed by: PHYSICIAN ASSISTANT

## 2020-11-25 PROCEDURE — 80061 LIPID PANEL: CPT

## 2020-11-25 PROCEDURE — 93010 ELECTROCARDIOGRAM REPORT: CPT | Performed by: INTERNAL MEDICINE

## 2020-11-25 PROCEDURE — 80053 COMPREHEN METABOLIC PANEL: CPT

## 2020-11-25 PROCEDURE — 36415 COLL VENOUS BLD VENIPUNCTURE: CPT

## 2020-11-25 PROCEDURE — 85025 COMPLETE CBC W/AUTO DIFF WBC: CPT

## 2020-11-25 PROCEDURE — 93005 ELECTROCARDIOGRAM TRACING: CPT

## 2020-11-30 ENCOUNTER — TELEPHONE (OUTPATIENT)
Dept: FAMILY MEDICINE CLINIC | Facility: CLINIC | Age: 35
End: 2020-11-30

## 2020-11-30 NOTE — TELEPHONE ENCOUNTER
Office staff please address. The patient stated she left a form for the [facility] insurance. She is asking if it was completed and faxed or does she need to pick it up?

## 2020-12-01 NOTE — TELEPHONE ENCOUNTER
Patient Px form was fax by Ruchi Stinson yesterday and original will be place at the [facility] .

## 2020-12-01 NOTE — TELEPHONE ENCOUNTER
Verified name and . Patient calling to follow up with [facility] form as seen in previous charting note below. Office staff, please assist. Thank you.

## 2020-12-10 RX ORDER — ACETAMINOPHEN AND CODEINE PHOSPHATE 120; 12 MG/5ML; MG/5ML
SOLUTION ORAL
Qty: 84 TABLET | Refills: 0 | Status: SHIPPED | OUTPATIENT
Start: 2020-12-10 | End: 2021-11-15

## 2020-12-16 ENCOUNTER — NURSE ONLY (OUTPATIENT)
Dept: LAB | Facility: HOSPITAL | Age: 35
End: 2020-12-16
Attending: PREVENTIVE MEDICINE
Payer: MEDICAID

## 2020-12-16 ENCOUNTER — TELEPHONE (OUTPATIENT)
Dept: INTERNAL MEDICINE CLINIC | Facility: HOSPITAL | Age: 35
End: 2020-12-16

## 2020-12-16 DIAGNOSIS — Z20.822 SUSPECTED 2019 NOVEL CORONAVIRUS INFECTION: Primary | ICD-10-CM

## 2020-12-16 DIAGNOSIS — Z20.822 SUSPECTED 2019 NOVEL CORONAVIRUS INFECTION: ICD-10-CM

## 2020-12-16 PROCEDURE — 87426 SARSCOV CORONAVIRUS AG IA: CPT

## 2020-12-16 NOTE — PROGRESS NOTES
Employee notified of negative rapid antigen result. See telephone note. Manager notified information via email.

## 2020-12-16 NOTE — TELEPHONE ENCOUNTER
Department:  Janelle Rivero                                 [] [facility]  [][facility]   [x] [facility]    Dept Manager/Supervisor/team or clinical lead: Jonathan Russell    Position:  [] MD     [] RN     [] Respiratory Therapist     [] PCT     [x] Other     What shift do yo workspace? Yes []   No [x]       If yes, with whom? Do you have any family members sick at home? [] Yes    [x] No   If yes, explain:       NOTES: (narrative documentation)          PLAN:   [x]No Known Exposure :  [x] Symptomatic: Test w/ Rapid now.  Jeraldene Pump

## 2020-12-16 NOTE — TELEPHONE ENCOUNTER
Results and RTW guidelines:    COVID RESULT GIVEN:      Test type:    [x] Rapid         []       [x] NEGATIVE     Ordered  retest?  [x]Yes   [] No (skip to RTW)       Date ordered:  12/16/20             Dated to be taken:  12/18/20    If Yes, P

## 2020-12-17 PROBLEM — M15.9 PRIMARY OSTEOARTHRITIS INVOLVING MULTIPLE JOINTS: Status: ACTIVE | Noted: 2020-12-17

## 2020-12-17 PROBLEM — M15.0 PRIMARY OSTEOARTHRITIS INVOLVING MULTIPLE JOINTS: Status: ACTIVE | Noted: 2020-12-17

## 2020-12-17 NOTE — PROGRESS NOTES
[address]  Dept: [phone]       Patient:  Ja Ceron  :      1985  MRN:      [id number]    Chief Complaint:  Patient pr Obesity (BMI 30-39. 9)    • Obesity, Class III, BMI 40-49.9 (morbid obesity) ([facility] 2017        Comorbidities:  None     OBJECTIVE     Vitals: /82 (BP Location: Left arm, Patient Position: Sitting)   Pulse 99   Ht 5' 4\" (1.626 m)   Wt 227 lb (103 kg) per session: Not on file      Stress: Not on file    Relationships      Social connections        Talks on phone: Not on file        Gets together: Not on file        Attends Christian service: Not on file        Active member of club or organization: Not of exercise?  Walk for  3 days/week- 30 minute sessions     Eating Habits  · Patient states the following:  · Eats 3 meal(s) per day  · Length of time it takes to consume a meal:   · # of snacks per day: 0-2   · Type of snacks:  Naked bars   · Amount of sod atraumatic, no cyanosis or edema  Pulses: 2+ and symmetric  Neurologic: Grossly normal    ASSESSMENT     Encounter Diagnosis(ses):   Stress  (primary encounter diagnosis)  Encounter for therapeutic drug monitoring  Weight gain  Obesity (bmi 30-39. 9)  Prima

## 2020-12-18 ENCOUNTER — LAB ENCOUNTER (OUTPATIENT)
Dept: LAB | Facility: HOSPITAL | Age: 35
End: 2020-12-18
Attending: PREVENTIVE MEDICINE
Payer: MEDICAID

## 2020-12-18 DIAGNOSIS — Z20.822 SUSPECTED 2019 NOVEL CORONAVIRUS INFECTION: ICD-10-CM

## 2020-12-19 NOTE — TELEPHONE ENCOUNTER
Results and RTW guidelines:    COVID RESULT GIVEN:      Test type:    [] Rapid         [x]       [x] NEGATIVE     Ordered  retest?  []Yes   [x] No (skip to RTW)       Date ordered:              Dated to be taken:      If Yes, PLACE ORDER NOW Notified/Labor  Notified, if pertinent

## 2020-12-21 ENCOUNTER — APPOINTMENT (OUTPATIENT)
Dept: GENERAL RADIOLOGY | Age: 35
End: 2020-12-21
Attending: NURSE PRACTITIONER
Payer: MEDICAID

## 2020-12-21 ENCOUNTER — HOSPITAL ENCOUNTER (OUTPATIENT)
Age: 35
Discharge: HOME | End: 2020-12-21
Payer: MEDICAID

## 2020-12-21 VITALS
RESPIRATION RATE: 20 BRPM | OXYGEN SATURATION: 100 % | SYSTOLIC BLOOD PRESSURE: 151 MMHG | HEART RATE: 92 BPM | DIASTOLIC BLOOD PRESSURE: 82 MMHG | TEMPERATURE: 98 F

## 2020-12-21 DIAGNOSIS — R05.9 COUGH: Primary | ICD-10-CM

## 2020-12-21 PROCEDURE — 99213 OFFICE O/P EST LOW 20 MIN: CPT | Performed by: NURSE PRACTITIONER

## 2020-12-21 PROCEDURE — 87880 STREP A ASSAY W/OPTIC: CPT | Performed by: NURSE PRACTITIONER

## 2020-12-21 PROCEDURE — 71046 X-RAY EXAM CHEST 2 VIEWS: CPT | Performed by: NURSE PRACTITIONER

## 2020-12-21 RX ORDER — ALBUTEROL SULFATE 90 UG/1
2 AEROSOL, METERED RESPIRATORY (INHALATION) EVERY 4 HOURS PRN
Qty: 1 INHALER | Refills: 0 | Status: SHIPPED | OUTPATIENT
Start: 2020-12-21 | End: 2021-01-20

## 2020-12-21 RX ORDER — AZITHROMYCIN 250 MG/1
TABLET, FILM COATED ORAL
Qty: 1 PACKAGE | Refills: 0 | Status: SHIPPED | OUTPATIENT
Start: 2020-12-21 | End: 2020-12-26

## 2020-12-21 RX ORDER — BENZONATATE 100 MG/1
100 CAPSULE ORAL 3 TIMES DAILY PRN
Qty: 30 CAPSULE | Refills: 0 | Status: SHIPPED | OUTPATIENT
Start: 2020-12-21 | End: 2021-01-20

## 2020-12-21 NOTE — ED PROVIDER NOTES
Patient presents with:  Cough: Entered by patient      HPI:     Lincoln Yeager is a 38year old female who presents for evaluation and management of a chief complaint of a productive cough with green and yellow sputum for the past week.   She did have 2 - quittin.6      Smokeless tobacco: Never Used    Substance and Sexual Activity      Alcohol use: Yes        Frequency: Monthly or less        Comment: occasionally       Drug use: No      Sexual activity: Not on file    Lifestyle      Physical activity (Exact Date)   SpO2 100%   GENERAL: well developed, well nourished, well hydrated, no distress  SKIN: good skin turgor, no obvious rashes  NECK: supple, no adenopathy. No neck stiffness.    CARDIO: RRR without murmur  EXTREMITIES: no cyanosis, edema, IVERSON wi negative. I will place the patient on a Z-Moises, albuterol inhaler, and Tessalon Perles. She will follow-up closely with her primary care doctor.   She is aware for any chest pain, difficulty breathing, or worsening symptoms, to go to the nearest emergency

## 2020-12-21 NOTE — ED INITIAL ASSESSMENT (HPI)
Patient states having dry cough x 10 days, states she has had a runny nose, post nasal drip and sore throat. Patient denies fever, denies body aches or chills. Patient states today she started noticing a whooping noise to her cough.   Patient denies chest

## 2020-12-22 ENCOUNTER — TELEPHONE (OUTPATIENT)
Dept: SURGERY | Facility: CLINIC | Age: 35
End: 2020-12-22

## 2020-12-22 NOTE — TELEPHONE ENCOUNTER
Called patient to let her know that a Prior Approval is needed for Sleep Study Dr. Stefanie Jamil ordered.  I informed her that a request has been submitted with her insurance and may take 5 to 7 business days for approval. I advised patient that I will call once I

## 2020-12-28 NOTE — TELEPHONE ENCOUNTER
12/25/20 @ [facility]  Received faxed Authorization from Greg Barnes for below services:    [facility]#[id number]  CPT 91400 - 1 unit   Effective: 12/22/20  Expiration: 3/22/21    Hard Copy of Auth was sent to [employer].

## 2021-01-05 ENCOUNTER — OFFICE VISIT (OUTPATIENT)
Dept: SLEEP CENTER | Age: 36
End: 2021-01-05
Attending: INTERNAL MEDICINE
Payer: COMMERCIAL

## 2021-01-05 DIAGNOSIS — R06.83 SNORING: ICD-10-CM

## 2021-01-05 DIAGNOSIS — G47.33 OSA (OBSTRUCTIVE SLEEP APNEA): Primary | ICD-10-CM

## 2021-01-05 PROCEDURE — 95806 SLEEP STUDY UNATT&RESP EFFT: CPT

## 2021-01-07 NOTE — PROCEDURES
[facility]  Accredited by the [place] of Sleep [employer] ([facility])    PATIENT'S NAME: Nirmala Eber   ATTENDING PHYSICIAN: Rupinder March.  Jeanette Cool DO   REFERRING PHYSICIAN: Gina Eason MD   PATIENT ACCOUNT #: [de-identified] LOCATION: Sleep sedating drug. 5.   The patient should not drive if at all sleepy. Please do not hesitate to contact me if there is any question whatsoever regarding interpretation of this study.     Dictated By Mya Hernandez MD  d: 01/06/2021 17:42:49  t: 01/06/20

## 2021-01-13 ENCOUNTER — OFFICE VISIT (OUTPATIENT)
Dept: SURGERY | Facility: CLINIC | Age: 36
End: 2021-01-13
Payer: COMMERCIAL

## 2021-01-13 VITALS
WEIGHT: 227 LBS | DIASTOLIC BLOOD PRESSURE: 70 MMHG | OXYGEN SATURATION: 100 % | HEART RATE: 103 BPM | BODY MASS INDEX: 38.76 KG/M2 | SYSTOLIC BLOOD PRESSURE: 120 MMHG | HEIGHT: 64 IN

## 2021-01-13 DIAGNOSIS — G47.33 OSA (OBSTRUCTIVE SLEEP APNEA): Primary | ICD-10-CM

## 2021-01-13 DIAGNOSIS — F43.9 STRESS: ICD-10-CM

## 2021-01-13 DIAGNOSIS — E66.01 OBESITY, CLASS III, BMI 40-49.9 (MORBID OBESITY) (HCC): ICD-10-CM

## 2021-01-13 DIAGNOSIS — M15.9 PRIMARY OSTEOARTHRITIS INVOLVING MULTIPLE JOINTS: ICD-10-CM

## 2021-01-13 DIAGNOSIS — Z51.81 ENCOUNTER FOR THERAPEUTIC DRUG MONITORING: ICD-10-CM

## 2021-01-13 DIAGNOSIS — R63.5 WEIGHT GAIN: ICD-10-CM

## 2021-01-13 PROCEDURE — 3074F SYST BP LT 130 MM HG: CPT | Performed by: INTERNAL MEDICINE

## 2021-01-13 PROCEDURE — 99214 OFFICE O/P EST MOD 30 MIN: CPT | Performed by: INTERNAL MEDICINE

## 2021-01-13 PROCEDURE — 3008F BODY MASS INDEX DOCD: CPT | Performed by: INTERNAL MEDICINE

## 2021-01-13 PROCEDURE — 3078F DIAST BP <80 MM HG: CPT | Performed by: INTERNAL MEDICINE

## 2021-01-13 RX ORDER — PHENTERMINE HYDROCHLORIDE 37.5 MG/1
37.5 TABLET ORAL
Qty: 30 TABLET | Refills: 2 | Status: SHIPPED | OUTPATIENT
Start: 2021-01-13 | End: 2021-03-10

## 2021-01-13 NOTE — PROGRESS NOTES
[address]  Dept: [phone]       Patient:  Yenifer Fan  :      1985  MRN:      [id number]    Chief Complaint:  Patient pr sleep apnea)         Comorbidities:  None     OBJECTIVE     Vitals: /70   Pulse 103   Ht 5' 4\" (1.626 m)   Wt 227 lb (103 kg)   LMP 12/02/2020 (Exact Date)   SpO2 100%   Breastfeeding No   BMI 38.96 kg/m²     Initial weight loss: 0   Total weight lo Monthly or less        Comment: occasionally       Drug use: No      Sexual activity: Not on file    Lifestyle      Physical activity        Days per week: Not on file        Minutes per session: Not on file      Stress: Not on file    Relationships      S Journal  · Reviewed and Discussed:       · Patient has a Food Journal?: no   · Patient is reading nutrition labels? yes  · Average Caloric Intake:     · Average CHO Intake:   · Is patient exercising? yes  · Type of exercise?  Walk for  3 days/week- 30 nathan CVA tenderness.   Lungs: clear to auscultation bilaterally  Heart: S1, S2 normal, no murmur, click, rub or gallop, regular rate and rhythm  Abdomen: soft, obese, non tender  Extremities: extremities normal, atraumatic, no cyanosis or edema  Pulses: 2+ and s

## 2021-02-04 ENCOUNTER — OFFICE VISIT (OUTPATIENT)
Dept: SURGERY | Facility: CLINIC | Age: 36
End: 2021-02-04
Payer: COMMERCIAL

## 2021-02-04 VITALS — BODY MASS INDEX: 39.27 KG/M2 | HEIGHT: 64 IN | WEIGHT: 230 LBS

## 2021-02-04 DIAGNOSIS — E66.01 CLASS 2 SEVERE OBESITY DUE TO EXCESS CALORIES WITH SERIOUS COMORBIDITY AND BODY MASS INDEX (BMI) OF 39.0 TO 39.9 IN ADULT (HCC): Primary | ICD-10-CM

## 2021-02-04 PROCEDURE — 3008F BODY MASS INDEX DOCD: CPT | Performed by: DIETITIAN, REGISTERED

## 2021-02-04 PROCEDURE — 97802 MEDICAL NUTRITION INDIV IN: CPT | Performed by: DIETITIAN, REGISTERED

## 2021-02-04 NOTE — PROGRESS NOTES
[address] [facility]  [place] AND WEIGHT LOSS CLINIC  [address] [place] [address]  Dept: [phone]  Loc: [phone]    02/04/21    Bariatric Initial Nutrition Assessment    Nazia Siu is a 38year old female.     Refe eating    Patient has tried: Weight Watchers    Patient's most successful weight loss attempt was medically supervised (phentermine). Lost 30 lbs  and kept it off 12 months. Patient has tried these medications for weight loss:  Ionamin/Adipex/Phentermine AST 22 11/25/2020    ALT 55 11/25/2020    BILT 1.1 11/25/2020    TP 8.2 11/25/2020    ALB 4.0 11/25/2020    GLOBULIN 4.2 11/25/2020     11/25/2020    K 4.9 11/25/2020     11/25/2020    CO2 28.0 11/25/2020     No results found for: MG  No result Consent (Near Eyelid Margin)/Introductory Paragraph: The rationale for Mohs was explained to the patient and consent was obtained. The risks, benefits and alternatives to therapy were discussed in detail. Specifically, the risks of ectropion or eyelid deformity, infection, scarring, bleeding, prolonged wound healing, incomplete removal, allergy to anesthesia, nerve injury and recurrence were addressed. Prior to the procedure, the treatment site was clearly identified and confirmed by the patient. All components of Universal Protocol/PAUSE Rule completed. Evening Snack   Coffee w/ almond milk, creamer or Premier shake + 2 eggs + 2-3 slices allison + 1 waffle  Grilled chicken w/ salad, Mariah (double cheese burger + small abreu), turkey sandwich (cheese, lettuce, kaminski, mustard) Ice cream bar, chips, chocolate Pasta MVI  2. Coordination of care: attend support group prior to surgery; reminder for importance of multidisciplinary consultations. 3.  Materials given: [facility] Bariatric Manual and Goals Handout    Goals:   1.  Keep food records  2. 60g protein, <100g CHO per

## 2021-02-08 ENCOUNTER — TELEPHONE (OUTPATIENT)
Dept: SURGERY | Facility: CLINIC | Age: 36
End: 2021-02-08

## 2021-02-10 ENCOUNTER — DOCUMENTATION ONLY (OUTPATIENT)
Dept: SURGERY | Facility: CLINIC | Age: 36
End: 2021-02-10

## 2021-02-10 ENCOUNTER — OFFICE VISIT (OUTPATIENT)
Dept: SURGERY | Facility: CLINIC | Age: 36
End: 2021-02-10
Payer: COMMERCIAL

## 2021-02-10 VITALS
BODY MASS INDEX: 39.37 KG/M2 | SYSTOLIC BLOOD PRESSURE: 111 MMHG | HEART RATE: 92 BPM | WEIGHT: 230.63 LBS | DIASTOLIC BLOOD PRESSURE: 80 MMHG | OXYGEN SATURATION: 99 % | HEIGHT: 64 IN

## 2021-02-10 DIAGNOSIS — M65.9 TENOSYNOVITIS, WRIST: ICD-10-CM

## 2021-02-10 DIAGNOSIS — M15.9 PRIMARY OSTEOARTHRITIS INVOLVING MULTIPLE JOINTS: ICD-10-CM

## 2021-02-10 DIAGNOSIS — E66.01 OBESITY, CLASS III, BMI 40-49.9 (MORBID OBESITY) (HCC): ICD-10-CM

## 2021-02-10 DIAGNOSIS — G47.33 OSA (OBSTRUCTIVE SLEEP APNEA): ICD-10-CM

## 2021-02-10 DIAGNOSIS — Z51.81 ENCOUNTER FOR THERAPEUTIC DRUG MONITORING: Primary | ICD-10-CM

## 2021-02-10 NOTE — H&P
[address] [place], [place]  [address]  Dept: [phone]    2/10/2021  Bariatric New Patient Evaluation    Chief Complaint:  morbid obesity     History of Present Ill History:   Procedure Laterality Date   • CHOLECYSTECTOMY  2003   • D & C  04/29/2016   • REMOVAL GALLBLADDER         Family History:    Family History   Problem Relation Age of Onset   • Diabetes Mother    • Kidney Disease Mother    • Hypertension Mother kg/m²   BMI:  Body mass index is 39.58 kg/m².   General: no acute distress, well-nourished  HENT: normocephalic, atraumatic  Lung: breathing comfortably, symmetrical expansion, no wheezing  Heart: regular rate and rhythm  Abdomen: soft, obese, non-tender, n Altered awareness, transient

## 2021-02-10 NOTE — PROGRESS NOTES
Oriented pt to the bariatric program; provided/reviewed bariatric packet of info, time line, referrals,etc. Pt agreed and verbalized understanding; attended seminar on 1/21/21.

## 2021-02-19 RX ORDER — ACETAMINOPHEN AND CODEINE PHOSPHATE 120; 12 MG/5ML; MG/5ML
0.35 SOLUTION ORAL DAILY
COMMUNITY
Start: 2020-12-10

## 2021-02-19 RX ORDER — PHENTERMINE HYDROCHLORIDE 37.5 MG/1
37.5 TABLET ORAL DAILY
COMMUNITY
Start: 2021-01-13

## 2021-02-22 ENCOUNTER — OFFICE VISIT (OUTPATIENT)
Dept: CARDIOLOGY | Age: 36
End: 2021-02-22

## 2021-02-22 VITALS
WEIGHT: 235 LBS | BODY MASS INDEX: 40.12 KG/M2 | HEART RATE: 68 BPM | SYSTOLIC BLOOD PRESSURE: 128 MMHG | HEIGHT: 64 IN | DIASTOLIC BLOOD PRESSURE: 78 MMHG

## 2021-02-22 DIAGNOSIS — Z01.810 PRE-OPERATIVE CARDIOVASCULAR EXAMINATION: Primary | ICD-10-CM

## 2021-02-22 PROCEDURE — 99244 OFF/OP CNSLTJ NEW/EST MOD 40: CPT | Performed by: INTERNAL MEDICINE

## 2021-02-22 PROCEDURE — 93000 ELECTROCARDIOGRAM COMPLETE: CPT | Performed by: INTERNAL MEDICINE

## 2021-02-22 SDOH — HEALTH STABILITY: PHYSICAL HEALTH: ON AVERAGE, HOW MANY DAYS PER WEEK DO YOU ENGAGE IN MODERATE TO STRENUOUS EXERCISE (LIKE A BRISK WALK)?: 2 DAYS

## 2021-02-22 ASSESSMENT — PATIENT HEALTH QUESTIONNAIRE - PHQ9
SUM OF ALL RESPONSES TO PHQ9 QUESTIONS 1 AND 2: 0
CLINICAL INTERPRETATION OF PHQ2 SCORE: NO FURTHER SCREENING NEEDED
1. LITTLE INTEREST OR PLEASURE IN DOING THINGS: NOT AT ALL
CLINICAL INTERPRETATION OF PHQ9 SCORE: NO FURTHER SCREENING NEEDED
SUM OF ALL RESPONSES TO PHQ9 QUESTIONS 1 AND 2: 0
2. FEELING DOWN, DEPRESSED OR HOPELESS: NOT AT ALL

## 2021-02-23 ENCOUNTER — MED REC SCAN ONLY (OUTPATIENT)
Dept: FAMILY MEDICINE CLINIC | Facility: CLINIC | Age: 36
End: 2021-02-23

## 2021-03-04 ENCOUNTER — LAB ENCOUNTER (OUTPATIENT)
Dept: LAB | Facility: HOSPITAL | Age: 36
End: 2021-03-04
Attending: SINGLE SPECIALTY
Payer: COMMERCIAL

## 2021-03-04 DIAGNOSIS — G47.33 OSA (OBSTRUCTIVE SLEEP APNEA): ICD-10-CM

## 2021-03-04 DIAGNOSIS — Z51.81 ENCOUNTER FOR THERAPEUTIC DRUG MONITORING: ICD-10-CM

## 2021-03-04 DIAGNOSIS — E66.01 OBESITY, CLASS III, BMI 40-49.9 (MORBID OBESITY) (HCC): ICD-10-CM

## 2021-03-04 DIAGNOSIS — M65.9 TENOSYNOVITIS, WRIST: ICD-10-CM

## 2021-03-04 DIAGNOSIS — M15.9 PRIMARY OSTEOARTHRITIS INVOLVING MULTIPLE JOINTS: ICD-10-CM

## 2021-03-04 LAB
DEPRECATED HBV CORE AB SER IA-ACNC: 33 NG/ML
FOLATE SERPL-MCNC: 17.4 NG/ML (ref 8.7–?)
IRON SATURATION: 26 %
IRON SERPL-MCNC: 108 UG/DL
TOTAL IRON BINDING CAPACITY: 411 UG/DL (ref 240–450)
TRANSFERRIN SERPL-MCNC: 276 MG/DL (ref 200–360)
VIT B12 SERPL-MCNC: 432 PG/ML (ref 193–986)

## 2021-03-04 PROCEDURE — 82607 VITAMIN B-12: CPT

## 2021-03-04 PROCEDURE — 82728 ASSAY OF FERRITIN: CPT

## 2021-03-04 PROCEDURE — 83540 ASSAY OF IRON: CPT

## 2021-03-04 PROCEDURE — 36415 COLL VENOUS BLD VENIPUNCTURE: CPT

## 2021-03-04 PROCEDURE — 84466 ASSAY OF TRANSFERRIN: CPT

## 2021-03-04 PROCEDURE — 82306 VITAMIN D 25 HYDROXY: CPT

## 2021-03-04 PROCEDURE — 82746 ASSAY OF FOLIC ACID SERUM: CPT

## 2021-03-04 PROCEDURE — 84425 ASSAY OF VITAMIN B-1: CPT

## 2021-03-05 LAB — 25(OH)D3 SERPL-MCNC: 14.6 NG/ML (ref 30–100)

## 2021-03-08 LAB — VITAMIN B1 (THIAMINE), WHOLE B: 122 NMOL/L

## 2021-03-08 RX ORDER — ACETAMINOPHEN AND CODEINE PHOSPHATE 120; 12 MG/5ML; MG/5ML
SOLUTION ORAL
Qty: 84 TABLET | Refills: 0 | OUTPATIENT
Start: 2021-03-08

## 2021-03-10 ENCOUNTER — OFFICE VISIT (OUTPATIENT)
Dept: SURGERY | Facility: CLINIC | Age: 36
End: 2021-03-10
Payer: COMMERCIAL

## 2021-03-10 VITALS
BODY MASS INDEX: 39.73 KG/M2 | SYSTOLIC BLOOD PRESSURE: 124 MMHG | HEIGHT: 64 IN | HEART RATE: 100 BPM | DIASTOLIC BLOOD PRESSURE: 74 MMHG | OXYGEN SATURATION: 99 % | WEIGHT: 232.69 LBS

## 2021-03-10 VITALS
HEART RATE: 96 BPM | HEIGHT: 64 IN | WEIGHT: 233.81 LBS | DIASTOLIC BLOOD PRESSURE: 76 MMHG | SYSTOLIC BLOOD PRESSURE: 112 MMHG | OXYGEN SATURATION: 97 % | BODY MASS INDEX: 39.91 KG/M2

## 2021-03-10 DIAGNOSIS — M65.9 TENOSYNOVITIS, WRIST: ICD-10-CM

## 2021-03-10 DIAGNOSIS — G47.33 OSA (OBSTRUCTIVE SLEEP APNEA): Primary | ICD-10-CM

## 2021-03-10 DIAGNOSIS — G47.33 OSA (OBSTRUCTIVE SLEEP APNEA): ICD-10-CM

## 2021-03-10 DIAGNOSIS — E66.01 MORBID OBESITY WITH BMI OF 40.0-44.9, ADULT (HCC): ICD-10-CM

## 2021-03-10 DIAGNOSIS — Z51.81 ENCOUNTER FOR THERAPEUTIC DRUG MONITORING: Primary | ICD-10-CM

## 2021-03-10 DIAGNOSIS — F43.9 STRESS: ICD-10-CM

## 2021-03-10 DIAGNOSIS — R63.5 WEIGHT GAIN: ICD-10-CM

## 2021-03-10 DIAGNOSIS — M15.9 PRIMARY OSTEOARTHRITIS INVOLVING MULTIPLE JOINTS: ICD-10-CM

## 2021-03-10 PROCEDURE — 3008F BODY MASS INDEX DOCD: CPT | Performed by: NURSE PRACTITIONER

## 2021-03-10 PROCEDURE — 99214 OFFICE O/P EST MOD 30 MIN: CPT | Performed by: NURSE PRACTITIONER

## 2021-03-10 PROCEDURE — 3078F DIAST BP <80 MM HG: CPT | Performed by: NURSE PRACTITIONER

## 2021-03-10 PROCEDURE — 3074F SYST BP LT 130 MM HG: CPT | Performed by: NURSE PRACTITIONER

## 2021-03-10 RX ORDER — ERGOCALCIFEROL 1.25 MG/1
50000 CAPSULE ORAL WEEKLY
Qty: 4 CAPSULE | Refills: 2 | Status: SHIPPED | OUTPATIENT
Start: 2021-03-10 | End: 2021-05-07

## 2021-03-10 NOTE — PROGRESS NOTES
[address]  [place] [facility]  Dept: [phone]    3/10/2021   Bariatric Patient Follow-up Evaluation    Chief Complaint:  morbid obesity     History of Pres Years of education: Not on file      Highest education level: Not on file    Tobacco Use      Smoking status: Former Smoker        Packs/day: 0.10        Years: 1.00        Pack years: .1        Types: Cigarettes        Quit date: 5/17/2014        Years si negative  Gastrointestinal: negative  Genitourinary: negative  Musculoskeletal: negative  Neurological: negative  Psychological: negative  Endocrine: negative  Immunologic: negative  Integumentary: negative      Physical Exam:  Vital Signs:  /74 (BP PM

## 2021-03-10 NOTE — PROGRESS NOTES
[address]  Dept: [phone]       Patient:  Maria Isabel Jerez  :      1985  MRN:      [id number]    Chief Complaint:  Patient pr diagnosed  RECOMMENDATIONS: Continue care with Dr. Galeana Base   • OA (osteoarthritis)    • Obesity (BMI 30-39. 9)    • Obesity, Class III, BMI 40-49.9 (morbid obesity) ([facility] 2017   • BONNIE (obstructive sleep apnea)         Comorbidities:  None     OBJECTIVE     Vit Hazards: Not Asked        Sleep Concern: Not Asked        Stress Concern: Not Asked        Weight Concern: Not Asked        Special Diet: Not Asked        Back Care: Not Asked        Exercise: Not Asked        Bike Helmet: Not Asked        Seat Belt: Not A nutrition labels? yes  · Average Caloric Intake:     · Average CHO Intake:   · Is patient exercising? yes  · Type of exercise?  Walk for  3 days/week- 30 minute sessions     Eating Habits  · Patient states the following:  · Eats 3 meal(s) per day  · Length rub or gallop, regular rate and rhythm  Abdomen: soft, obese, non tender  Extremities: extremities normal, atraumatic, no cyanosis or edema  Pulses: 2+ and symmetric  Neurologic: Grossly normal    ASSESSMENT     Encounter Diagnosis(ses):   Encounter for th

## 2021-03-12 ENCOUNTER — ORDER TRANSCRIPTION (OUTPATIENT)
Dept: SLEEP CENTER | Age: 36
End: 2021-03-12

## 2021-03-12 ENCOUNTER — LAB ENCOUNTER (OUTPATIENT)
Dept: LAB | Age: 36
End: 2021-03-12
Attending: NURSE PRACTITIONER
Payer: COMMERCIAL

## 2021-03-12 DIAGNOSIS — Z51.81 ENCOUNTER FOR THERAPEUTIC DRUG MONITORING: ICD-10-CM

## 2021-03-12 DIAGNOSIS — G47.33 OSA (OBSTRUCTIVE SLEEP APNEA): ICD-10-CM

## 2021-03-12 DIAGNOSIS — M15.9 PRIMARY OSTEOARTHRITIS INVOLVING MULTIPLE JOINTS: ICD-10-CM

## 2021-03-12 DIAGNOSIS — M65.9 TENOSYNOVITIS, WRIST: ICD-10-CM

## 2021-03-12 DIAGNOSIS — E66.01 MORBID OBESITY WITH BMI OF 40.0-44.9, ADULT (HCC): ICD-10-CM

## 2021-03-12 DIAGNOSIS — F43.9 STRESS: ICD-10-CM

## 2021-03-12 DIAGNOSIS — G47.33 OBSTRUCTIVE SLEEP APNEA (ADULT) (PEDIATRIC): Primary | ICD-10-CM

## 2021-03-12 DIAGNOSIS — R63.5 WEIGHT GAIN: ICD-10-CM

## 2021-03-12 LAB
EST. AVERAGE GLUCOSE BLD GHB EST-MCNC: 123 MG/DL (ref 68–126)
HBA1C MFR BLD HPLC: 5.9 % (ref ?–5.7)

## 2021-03-12 PROCEDURE — 36415 COLL VENOUS BLD VENIPUNCTURE: CPT

## 2021-03-12 PROCEDURE — 83036 HEMOGLOBIN GLYCOSYLATED A1C: CPT

## 2021-03-18 ENCOUNTER — OFFICE VISIT (OUTPATIENT)
Dept: SURGERY | Facility: CLINIC | Age: 36
End: 2021-03-18
Payer: COMMERCIAL

## 2021-03-18 VITALS — WEIGHT: 234 LBS | BODY MASS INDEX: 39.95 KG/M2 | HEIGHT: 64 IN

## 2021-03-18 DIAGNOSIS — E66.01 CLASS 3 SEVERE OBESITY DUE TO EXCESS CALORIES WITH SERIOUS COMORBIDITY AND BODY MASS INDEX (BMI) OF 40.0 TO 44.9 IN ADULT (HCC): Primary | ICD-10-CM

## 2021-03-18 PROCEDURE — 3008F BODY MASS INDEX DOCD: CPT | Performed by: DIETITIAN, REGISTERED

## 2021-03-18 PROCEDURE — 97803 MED NUTRITION INDIV SUBSEQ: CPT | Performed by: DIETITIAN, REGISTERED

## 2021-03-18 PROCEDURE — 0358T BIA WHOLE BODY: CPT | Performed by: DIETITIAN, REGISTERED

## 2021-03-18 NOTE — PROGRESS NOTES
[facility]  [facility]  [address] [address]  Dept: [phone]  Loc: [phone]    03/18/21      Bariatric Follow-up Nutrition Session    Mary Linder is a 38year old female.      As [id number] [id number] 03/04/2021     Lab Results   Component Value Date    VITD 14.6 (L) 03/04/2021     Lab Results   Component Value Date/Time    THIAMINE 122 03/04/2021 09:11 AM      No results found for: VITB1  Lab Results   Component Value Date/Time    FOLIC 17. 4 Reviewed the importance of monitoring kcal/protein/carb intake and having balanced meals. Water intake has improved and pt has switched to decaf coffee. Reviewed bariatric MV's, where to purchase, and how to take.  Also reviewed appropriate protein shakes f

## 2021-03-18 NOTE — PROGRESS NOTES
Body Composition Analysis #1     1. Weight 231.1 lbs     2. BMI 39.7     3. BMR 1566 kcal     4. Percent body fat 47.2% (Goal <30%)     5. Visceral fat level 20 (Goal <10)     6. ECW/TBW 0.375     7.  SMM (skeletal muscle mass) 68.8 lbs                  Lise

## 2021-03-31 ENCOUNTER — LAB ENCOUNTER (OUTPATIENT)
Dept: LAB | Facility: HOSPITAL | Age: 36
End: 2021-03-31
Attending: INTERNAL MEDICINE
Payer: COMMERCIAL

## 2021-03-31 DIAGNOSIS — G47.33 OBSTRUCTIVE SLEEP APNEA (ADULT) (PEDIATRIC): ICD-10-CM

## 2021-04-03 ENCOUNTER — OFFICE VISIT (OUTPATIENT)
Dept: SLEEP CENTER | Age: 36
End: 2021-04-03
Attending: INTERNAL MEDICINE
Payer: COMMERCIAL

## 2021-04-03 DIAGNOSIS — Z76.89 SLEEP CONCERN: Primary | ICD-10-CM

## 2021-04-03 DIAGNOSIS — G47.33 OBSTRUCTIVE SLEEP APNEA (ADULT) (PEDIATRIC): ICD-10-CM

## 2021-04-03 PROCEDURE — 95811 POLYSOM 6/>YRS CPAP 4/> PARM: CPT

## 2021-04-07 ENCOUNTER — OFFICE VISIT (OUTPATIENT)
Dept: SURGERY | Facility: CLINIC | Age: 36
End: 2021-04-07
Payer: COMMERCIAL

## 2021-04-07 VITALS
OXYGEN SATURATION: 97 % | HEIGHT: 64 IN | SYSTOLIC BLOOD PRESSURE: 104 MMHG | WEIGHT: 233.88 LBS | DIASTOLIC BLOOD PRESSURE: 70 MMHG | BODY MASS INDEX: 39.93 KG/M2 | HEART RATE: 99 BPM

## 2021-04-07 DIAGNOSIS — G47.33 OSA (OBSTRUCTIVE SLEEP APNEA): Primary | ICD-10-CM

## 2021-04-07 DIAGNOSIS — E66.01 MORBID OBESITY WITH BMI OF 40.0-44.9, ADULT (HCC): ICD-10-CM

## 2021-04-07 DIAGNOSIS — E66.9 OBESITY (BMI 30-39.9): ICD-10-CM

## 2021-04-07 RX ORDER — PHENTERMINE HYDROCHLORIDE 37.5 MG/1
TABLET ORAL
COMMUNITY
Start: 2021-03-10 | End: 2021-04-08

## 2021-04-07 NOTE — PROGRESS NOTES
[address], [address]  [facility]  Dept: [phone]    4/7/2021    Bariatric Patient Follow-up Evaluation    Chief Complaint:  morbid obesity     History of Pres Not on file      Years of education: Not on file      Highest education level: Not on file    Tobacco Use      Smoking status: Former Smoker        Packs/day: 0.10        Years: 1.00        Pack years: .1        Types: Cigarettes        Quit date: 5/17/201 negative  HEENT: negative  Respiratory: negative  Cardiovascular: negative  Gastrointestinal: negative  Genitourinary: negative  Musculoskeletal: negative  Neurological: negative  Psychological: negative  Endocrine: negative  Immunologic: negative  Integum to proceed. Possibility of encountering a significant hiatal hernia that would require repair with associated risk of re-herniation discussed as well.   On the other hand, a hiatal hernia evaluated at the time of laparoscopy might not be repaired if felt t

## 2021-04-08 ENCOUNTER — OFFICE VISIT (OUTPATIENT)
Dept: SURGERY | Facility: CLINIC | Age: 36
End: 2021-04-08
Payer: COMMERCIAL

## 2021-04-08 VITALS
WEIGHT: 232.81 LBS | DIASTOLIC BLOOD PRESSURE: 74 MMHG | HEIGHT: 64 IN | SYSTOLIC BLOOD PRESSURE: 130 MMHG | HEART RATE: 84 BPM | BODY MASS INDEX: 39.75 KG/M2 | OXYGEN SATURATION: 98 %

## 2021-04-08 DIAGNOSIS — M15.9 PRIMARY OSTEOARTHRITIS INVOLVING MULTIPLE JOINTS: ICD-10-CM

## 2021-04-08 DIAGNOSIS — G47.33 OSA (OBSTRUCTIVE SLEEP APNEA): Primary | ICD-10-CM

## 2021-04-08 DIAGNOSIS — M65.9 TENOSYNOVITIS, WRIST: ICD-10-CM

## 2021-04-08 DIAGNOSIS — R06.83 SNORING: ICD-10-CM

## 2021-04-08 DIAGNOSIS — F43.9 STRESS: ICD-10-CM

## 2021-04-08 DIAGNOSIS — E66.01 MORBID OBESITY WITH BMI OF 40.0-44.9, ADULT (HCC): ICD-10-CM

## 2021-04-08 PROCEDURE — 3075F SYST BP GE 130 - 139MM HG: CPT | Performed by: NURSE PRACTITIONER

## 2021-04-08 PROCEDURE — 3078F DIAST BP <80 MM HG: CPT | Performed by: NURSE PRACTITIONER

## 2021-04-08 PROCEDURE — 99214 OFFICE O/P EST MOD 30 MIN: CPT | Performed by: NURSE PRACTITIONER

## 2021-04-08 PROCEDURE — 3008F BODY MASS INDEX DOCD: CPT | Performed by: NURSE PRACTITIONER

## 2021-04-08 NOTE — PROCEDURES
[address] [facility]  Accredited by the [facility] of Sleep Medicine ([facility])    PATIENT'S NAME: Alley Hoang   ATTENDING PHYSICIAN: Yousuf Hanson. Jonathan Puri MD   REFERRING PHYSICIAN: Yousuf Hanson.  Jonathan Puri MD   PATIENT ACCOUNT #: [de-identified] LOCATION: Sleep Center

## 2021-04-08 NOTE — PROGRESS NOTES
[address]  Dept: [phone]       Patient:  Shena Stovall  :      1985  MRN:      [id number]    Chief Complaint:  Patient pr 40-49.9 (morbid obesity) ([facility] 2017   • BONNIE (obstructive sleep apnea)         Comorbidities:  OA, BONNIE     OBJECTIVE     Vitals: /74   Pulse 84   Ht 5' 4\" (1.626 m)   Wt 232 lb 12.8 oz (105.6 kg)   LMP 12/02/2020 (Exact Date)   SpO2 98%   BMI 39.96 k Special Diet: Not Asked        Back Care: Not Asked        Exercise: Not Asked        Bike Helmet: Not Asked        Seat Belt: Not Asked        Self-Exams: Not Asked        Grew up on a farm: Not Asked        History of tanning: Not Asked        Outdoor exercise?  Walk for  3 days/week- 30 minute sessions     Eating Habits  · Patient states the following:  · Eats 3 meal(s) per day  · Length of time it takes to consume a meal:   · # of snacks per day: 0-2   · Type of snacks:  Naked bars   · Amount of soda c no cyanosis or edema  Pulses: 2+ and symmetric  Neurologic: Grossly normal    ASSESSMENT     Encounter Diagnosis(ses):   Andrew (obstructive sleep apnea)  (primary encounter diagnosis)  Primary osteoarthritis involving multiple joints  Stress  Tenosynovitis,

## 2021-04-08 NOTE — PROGRESS NOTES
[address]  Dept: [phone]       Patient:  Jasper Orozco  :      1985  MRN:      [id number]    Chief Complaint:  Patient pr diagnosed  RECOMMENDATIONS: Continue care with Dr. Hermelinda Maloney   • OA (osteoarthritis)    • Obesity (BMI 30-39. 9)    • Obesity, Class III, BMI 40-49.9 (morbid obesity) ([facility] 2017   • BONNIE (obstructive sleep apnea)         Comorbidities:  None     OBJECTIVE     Vit Sleep Concern: Not Asked        Stress Concern: Not Asked        Weight Concern: Not Asked        Special Diet: Not Asked        Back Care: Not Asked        Exercise: Not Asked        Bike Helmet: Not Asked        Seat Belt: Not Asked        Self-Exams: No yes  · Average Caloric Intake:     · Average CHO Intake:   · Is patient exercising? yes  · Type of exercise?  Walk for  3 days/week- 30 minute sessions     Eating Habits  · Patient states the following:  · Eats 3 meal(s) per day  · Length of time it takes t regular rate and rhythm  Abdomen: soft, obese, non tender  Extremities: extremities normal, atraumatic, no cyanosis or edema  Pulses: 2+ and symmetric  Neurologic: Grossly normal    ASSESSMENT     Encounter Diagnosis(ses):   No diagnosis found.     PLAN

## 2021-04-13 ENCOUNTER — IMMUNIZATION (OUTPATIENT)
Dept: LAB | Age: 36
End: 2021-04-13
Attending: HOSPITALIST
Payer: COMMERCIAL

## 2021-04-13 DIAGNOSIS — Z23 NEED FOR VACCINATION: Primary | ICD-10-CM

## 2021-04-13 PROCEDURE — 0001A SARSCOV2 VAC 30MCG/0.3ML IM: CPT

## 2021-04-29 ENCOUNTER — OFFICE VISIT (OUTPATIENT)
Dept: SURGERY | Facility: CLINIC | Age: 36
End: 2021-04-29
Payer: COMMERCIAL

## 2021-04-29 VITALS — HEIGHT: 64 IN | BODY MASS INDEX: 39.78 KG/M2 | WEIGHT: 233 LBS

## 2021-04-29 DIAGNOSIS — E66.01 CLASS 2 SEVERE OBESITY DUE TO EXCESS CALORIES WITH SERIOUS COMORBIDITY AND BODY MASS INDEX (BMI) OF 39.0 TO 39.9 IN ADULT (HCC): Primary | ICD-10-CM

## 2021-04-29 PROCEDURE — 97803 MED NUTRITION INDIV SUBSEQ: CPT | Performed by: DIETITIAN, REGISTERED

## 2021-04-29 PROCEDURE — 3008F BODY MASS INDEX DOCD: CPT | Performed by: DIETITIAN, REGISTERED

## 2021-04-29 NOTE — PROGRESS NOTES
[address]  [address] [address]  Dept: [phone]  Loc: [phone]    04/29/21    Bariatric Liquid Protein Nutrition Session    Mary Casey is a 38year old female    A 08/31/2018     08/30/2018        Diabetes:    HGBA1C:    Lab Results   Component Value Date    A1C 5.9 (H) 03/12/2021    A1C 5.3 09/26/2017    A1C 5.3 05/04/2017     03/12/2021       Lipid Panel:  Lab Results   Component Value Date    CHOLEST the LPD 5/24/2021 and drink 5 [employer] 64 oz or more of clear liquids in between. Pt also instructed to monitor energy levels and adjust activity, as needed. Pt agreeable and verbalized understanding, will f/u 2-6 weeks post-op.     Lashaeo

## 2021-05-04 ENCOUNTER — IMMUNIZATION (OUTPATIENT)
Dept: LAB | Age: 36
End: 2021-05-04
Attending: HOSPITALIST
Payer: COMMERCIAL

## 2021-05-04 DIAGNOSIS — Z23 NEED FOR VACCINATION: Primary | ICD-10-CM

## 2021-05-04 PROCEDURE — 0002A SARSCOV2 VAC 30MCG/0.3ML IM: CPT

## 2021-05-07 RX ORDER — ERGOCALCIFEROL 1.25 MG/1
50000 CAPSULE ORAL WEEKLY
Qty: 12 CAPSULE | Refills: 1 | Status: ON HOLD | OUTPATIENT
Start: 2021-05-07 | End: 2021-06-08

## 2021-05-12 ENCOUNTER — OFFICE VISIT (OUTPATIENT)
Dept: SURGERY | Facility: CLINIC | Age: 36
End: 2021-05-12
Payer: COMMERCIAL

## 2021-05-12 VITALS
HEIGHT: 64 IN | OXYGEN SATURATION: 97 % | DIASTOLIC BLOOD PRESSURE: 70 MMHG | WEIGHT: 233 LBS | HEART RATE: 91 BPM | BODY MASS INDEX: 39.78 KG/M2 | SYSTOLIC BLOOD PRESSURE: 110 MMHG

## 2021-05-12 DIAGNOSIS — G47.33 OSA (OBSTRUCTIVE SLEEP APNEA): ICD-10-CM

## 2021-05-12 DIAGNOSIS — E66.01 MORBID OBESITY WITH BMI OF 40.0-44.9, ADULT (HCC): Primary | ICD-10-CM

## 2021-05-12 NOTE — PROGRESS NOTES
[address] [place] [employer], [place]  [address]  Dept: [phone]    5/12/2021   Bariatric Patient Follow-up Evaluation    Chief Complaint:  morbid obesity     History of Pres Not on file      Years of education: Not on file      Highest education level: Not on file    Tobacco Use      Smoking status: Former Smoker        Packs/day: 0.10        Years: 1.00        Pack years: .1        Types: Cigarettes        Quit date: 5/17/201 affect  Skin: warm, dry    Assessment: 38year old female with chronic morbid obesity, BMI 40.15, BONNIE, gestational DM, and osteoarthritis who would benefit from significant and sustained weight loss.   MBSC predicts weight of 151 lbs at 1 year which is in l 5    BMI - <40 - 1  40-49 - 2  50-59 - 3  60+ - 4    Male - 2  Smoking history - 2  OR time > 3 hours - 2  Prior history of VTE - 5    Total Score  7    0-14 --> Low risk --> standard prophylaxis  15-19 --> Medium risk --> protocol for postop chemoprophyla

## 2021-05-18 ENCOUNTER — NURSE ONLY (OUTPATIENT)
Dept: LAB | Age: 36
End: 2021-05-18
Attending: PREVENTIVE MEDICINE

## 2021-05-18 ENCOUNTER — TELEPHONE (OUTPATIENT)
Dept: INTERNAL MEDICINE CLINIC | Facility: HOSPITAL | Age: 36
End: 2021-05-18

## 2021-05-18 DIAGNOSIS — Z20.822 SUSPECTED COVID-19 VIRUS INFECTION: Primary | ICD-10-CM

## 2021-05-18 DIAGNOSIS — Z20.822 SUSPECTED COVID-19 VIRUS INFECTION: ICD-10-CM

## 2021-05-18 NOTE — TELEPHONE ENCOUNTER
Results and RTW guidelines:    COVID RESULT GIVEN:      Test type:    [x] Rapid         [] Alinity      [x] NEGATIVE     Ordered Alinity retest?  []Yes   [x] No (skip to RTW)       Date ordered:               Dated to be taken:      If Yes, PLACE ORDER S/S of worsening infection/condition and importance of prompt medical re-evaluation including when to seek emergency care    Estimated RTW date:  5/19/2021     [x] The employee voiced understanding of above plan/instructions  [x] Manager Notified

## 2021-05-18 NOTE — TELEPHONE ENCOUNTER
Department: HND - IC registration                                 [] [id number] [facility]  []NOVA   [x] [facility]    Dept Manager/Supervisor/team or clinical lead:  Makeda Louie    Position:  [] MD     [] RN     [] Respiratory Therapist     [] PCT     [x] Other PSR    HAV [phone]    Did you have close contact with someone on your unit while not wearing a mask? (e.g., during meal breaks):  Yes []   No [x]    If yes, who:   Do you share a workspace? Yes []   No [x]       If yes, with whom?    Do you have any family members results  [x]  Quarantine instructions  [x]  S/S of worsening infection/condition and importance of prompt medical re-evaluation including when to seek emergency care.    [x] The employee voiced understanding

## 2021-06-04 ENCOUNTER — LAB ENCOUNTER (OUTPATIENT)
Dept: LAB | Facility: HOSPITAL | Age: 36
End: 2021-06-04
Attending: SINGLE SPECIALTY
Payer: COMMERCIAL

## 2021-06-04 DIAGNOSIS — Z01.818 PREOP TESTING: ICD-10-CM

## 2021-06-04 PROCEDURE — 86900 BLOOD TYPING SEROLOGIC ABO: CPT

## 2021-06-04 PROCEDURE — 36415 COLL VENOUS BLD VENIPUNCTURE: CPT

## 2021-06-04 PROCEDURE — 86901 BLOOD TYPING SEROLOGIC RH(D): CPT

## 2021-06-04 PROCEDURE — 86850 RBC ANTIBODY SCREEN: CPT

## 2021-06-07 ENCOUNTER — ANESTHESIA EVENT (OUTPATIENT)
Dept: SURGERY | Facility: HOSPITAL | Age: 36
DRG: 621 | End: 2021-06-07
Payer: COMMERCIAL

## 2021-06-07 ENCOUNTER — ANESTHESIA (OUTPATIENT)
Dept: SURGERY | Facility: HOSPITAL | Age: 36
DRG: 621 | End: 2021-06-07
Payer: COMMERCIAL

## 2021-06-07 ENCOUNTER — HOSPITAL ENCOUNTER (INPATIENT)
Facility: HOSPITAL | Age: 36
LOS: 1 days | Discharge: HOME | DRG: 621 | End: 2021-06-08
Attending: SINGLE SPECIALTY | Admitting: SINGLE SPECIALTY
Payer: COMMERCIAL

## 2021-06-07 DIAGNOSIS — G47.33 OSA (OBSTRUCTIVE SLEEP APNEA): ICD-10-CM

## 2021-06-07 DIAGNOSIS — Z01.818 PREOP TESTING: Primary | ICD-10-CM

## 2021-06-07 DIAGNOSIS — E66.9 OBESITY (BMI 30-39.9): ICD-10-CM

## 2021-06-07 PROBLEM — E66.01 MORBID (SEVERE) OBESITY DUE TO EXCESS CALORIES (HCC): Status: ACTIVE | Noted: 2021-06-07

## 2021-06-07 PROBLEM — E66.01 MORBID OBESITY (HCC): Status: ACTIVE | Noted: 2017-02-22

## 2021-06-07 PROCEDURE — 0DB64ZZ EXCISION OF STOMACH, PERCUTANEOUS ENDOSCOPIC APPROACH: ICD-10-PCS | Performed by: SINGLE SPECIALTY

## 2021-06-07 PROCEDURE — 0DJ08ZZ INSPECTION OF UPPER INTESTINAL TRACT, VIA NATURAL OR ARTIFICIAL OPENING ENDOSCOPIC: ICD-10-PCS | Performed by: SINGLE SPECIALTY

## 2021-06-07 PROCEDURE — 3E0T3BZ INTRODUCTION OF ANESTHETIC AGENT INTO PERIPHERAL NERVES AND PLEXI, PERCUTANEOUS APPROACH: ICD-10-PCS | Performed by: SINGLE SPECIALTY

## 2021-06-07 PROCEDURE — 99232 SBSQ HOSP IP/OBS MODERATE 35: CPT | Performed by: HOSPITALIST

## 2021-06-07 PROCEDURE — 0D164ZA BYPASS STOMACH TO JEJUNUM, PERCUTANEOUS ENDOSCOPIC APPROACH: ICD-10-PCS | Performed by: SINGLE SPECIALTY

## 2021-06-07 RX ORDER — ACETAMINOPHEN 160 MG/5ML
1000 SOLUTION ORAL EVERY 8 HOURS
Status: DISCONTINUED | OUTPATIENT
Start: 2021-06-07 | End: 2021-06-08

## 2021-06-07 RX ORDER — OXYCODONE HYDROCHLORIDE 5 MG/1
5 TABLET ORAL EVERY 6 HOURS PRN
Status: DISCONTINUED | OUTPATIENT
Start: 2021-06-07 | End: 2021-06-08

## 2021-06-07 RX ORDER — HYDROMORPHONE HYDROCHLORIDE 1 MG/ML
0.6 INJECTION, SOLUTION INTRAMUSCULAR; INTRAVENOUS; SUBCUTANEOUS EVERY 5 MIN PRN
Status: DISCONTINUED | OUTPATIENT
Start: 2021-06-07 | End: 2021-06-07 | Stop reason: HOSPADM

## 2021-06-07 RX ORDER — ENOXAPARIN SODIUM 100 MG/ML
40 INJECTION SUBCUTANEOUS DAILY
Status: DISCONTINUED | OUTPATIENT
Start: 2021-06-08 | End: 2021-06-08

## 2021-06-07 RX ORDER — ONDANSETRON 2 MG/ML
4 INJECTION INTRAMUSCULAR; INTRAVENOUS ONCE AS NEEDED
Status: DISCONTINUED | OUTPATIENT
Start: 2021-06-07 | End: 2021-06-07 | Stop reason: HOSPADM

## 2021-06-07 RX ORDER — KETOROLAC TROMETHAMINE 30 MG/ML
30 INJECTION, SOLUTION INTRAMUSCULAR; INTRAVENOUS EVERY 6 HOURS
Status: DISCONTINUED | OUTPATIENT
Start: 2021-06-07 | End: 2021-06-08

## 2021-06-07 RX ORDER — SODIUM CHLORIDE, SODIUM LACTATE, POTASSIUM CHLORIDE, CALCIUM CHLORIDE 600; 310; 30; 20 MG/100ML; MG/100ML; MG/100ML; MG/100ML
INJECTION, SOLUTION INTRAVENOUS CONTINUOUS
Status: DISCONTINUED | OUTPATIENT
Start: 2021-06-07 | End: 2021-06-08

## 2021-06-07 RX ORDER — GLYCOPYRROLATE 0.2 MG/ML
INJECTION, SOLUTION INTRAMUSCULAR; INTRAVENOUS AS NEEDED
Status: DISCONTINUED | OUTPATIENT
Start: 2021-06-07 | End: 2021-06-07 | Stop reason: SURG

## 2021-06-07 RX ORDER — CELECOXIB 200 MG/1
400 CAPSULE ORAL ONCE
Status: DISCONTINUED | OUTPATIENT
Start: 2021-06-07 | End: 2021-06-07 | Stop reason: HOSPADM

## 2021-06-07 RX ORDER — PHENYLEPHRINE HCL 10 MG/ML
VIAL (ML) INJECTION AS NEEDED
Status: DISCONTINUED | OUTPATIENT
Start: 2021-06-07 | End: 2021-06-07 | Stop reason: SURG

## 2021-06-07 RX ORDER — FAMOTIDINE 20 MG/1
20 TABLET ORAL ONCE
Status: DISCONTINUED | OUTPATIENT
Start: 2021-06-07 | End: 2021-06-07 | Stop reason: HOSPADM

## 2021-06-07 RX ORDER — MORPHINE SULFATE 4 MG/ML
4 INJECTION, SOLUTION INTRAMUSCULAR; INTRAVENOUS EVERY 2 HOUR PRN
Status: DISCONTINUED | OUTPATIENT
Start: 2021-06-07 | End: 2021-06-08

## 2021-06-07 RX ORDER — ACETAMINOPHEN 500 MG
1000 TABLET ORAL ONCE
Status: DISCONTINUED | OUTPATIENT
Start: 2021-06-07 | End: 2021-06-07

## 2021-06-07 RX ORDER — HYDROCODONE BITARTRATE AND ACETAMINOPHEN 5; 325 MG/1; MG/1
1 TABLET ORAL AS NEEDED
Status: DISCONTINUED | OUTPATIENT
Start: 2021-06-07 | End: 2021-06-07 | Stop reason: HOSPADM

## 2021-06-07 RX ORDER — HEPARIN SODIUM 5000 [USP'U]/ML
5000 INJECTION, SOLUTION INTRAVENOUS; SUBCUTANEOUS ONCE
Status: DISCONTINUED | OUTPATIENT
Start: 2021-06-07 | End: 2021-06-07 | Stop reason: HOSPADM

## 2021-06-07 RX ORDER — PROCHLORPERAZINE EDISYLATE 5 MG/ML
5 INJECTION INTRAMUSCULAR; INTRAVENOUS ONCE AS NEEDED
Status: DISCONTINUED | OUTPATIENT
Start: 2021-06-07 | End: 2021-06-07 | Stop reason: HOSPADM

## 2021-06-07 RX ORDER — MORPHINE SULFATE 4 MG/ML
4 INJECTION, SOLUTION INTRAMUSCULAR; INTRAVENOUS EVERY 10 MIN PRN
Status: DISCONTINUED | OUTPATIENT
Start: 2021-06-07 | End: 2021-06-07 | Stop reason: HOSPADM

## 2021-06-07 RX ORDER — MORPHINE SULFATE 2 MG/ML
2 INJECTION, SOLUTION INTRAMUSCULAR; INTRAVENOUS EVERY 2 HOUR PRN
Status: DISCONTINUED | OUTPATIENT
Start: 2021-06-07 | End: 2021-06-08

## 2021-06-07 RX ORDER — EPHEDRINE SULFATE 50 MG/ML
INJECTION, SOLUTION INTRAVENOUS AS NEEDED
Status: DISCONTINUED | OUTPATIENT
Start: 2021-06-07 | End: 2021-06-07 | Stop reason: SURG

## 2021-06-07 RX ORDER — METOCLOPRAMIDE 10 MG/1
10 TABLET ORAL ONCE
Status: DISCONTINUED | OUTPATIENT
Start: 2021-06-07 | End: 2021-06-07 | Stop reason: HOSPADM

## 2021-06-07 RX ORDER — PANTOPRAZOLE SODIUM 40 MG/1
40 TABLET, DELAYED RELEASE ORAL
Status: DISCONTINUED | OUTPATIENT
Start: 2021-06-08 | End: 2021-06-08

## 2021-06-07 RX ORDER — HYDROCODONE BITARTRATE AND ACETAMINOPHEN 5; 325 MG/1; MG/1
2 TABLET ORAL AS NEEDED
Status: DISCONTINUED | OUTPATIENT
Start: 2021-06-07 | End: 2021-06-07 | Stop reason: HOSPADM

## 2021-06-07 RX ORDER — ACETAMINOPHEN 500 MG
1000 TABLET ORAL ONCE
Status: DISCONTINUED | OUTPATIENT
Start: 2021-06-07 | End: 2021-06-07 | Stop reason: HOSPADM

## 2021-06-07 RX ORDER — LIDOCAINE HYDROCHLORIDE 10 MG/ML
INJECTION, SOLUTION EPIDURAL; INFILTRATION; INTRACAUDAL; PERINEURAL AS NEEDED
Status: DISCONTINUED | OUTPATIENT
Start: 2021-06-07 | End: 2021-06-07 | Stop reason: SURG

## 2021-06-07 RX ORDER — GABAPENTIN 300 MG/1
300 CAPSULE ORAL ONCE
Status: DISCONTINUED | OUTPATIENT
Start: 2021-06-07 | End: 2021-06-07 | Stop reason: HOSPADM

## 2021-06-07 RX ORDER — MORPHINE SULFATE 10 MG/ML
6 INJECTION, SOLUTION INTRAMUSCULAR; INTRAVENOUS EVERY 10 MIN PRN
Status: DISCONTINUED | OUTPATIENT
Start: 2021-06-07 | End: 2021-06-07 | Stop reason: HOSPADM

## 2021-06-07 RX ORDER — MORPHINE SULFATE 2 MG/ML
1 INJECTION, SOLUTION INTRAMUSCULAR; INTRAVENOUS EVERY 2 HOUR PRN
Status: DISCONTINUED | OUTPATIENT
Start: 2021-06-07 | End: 2021-06-08

## 2021-06-07 RX ORDER — ONDANSETRON 2 MG/ML
4 INJECTION INTRAMUSCULAR; INTRAVENOUS EVERY 6 HOURS PRN
Status: DISCONTINUED | OUTPATIENT
Start: 2021-06-07 | End: 2021-06-08

## 2021-06-07 RX ORDER — HYDROMORPHONE HYDROCHLORIDE 1 MG/ML
0.2 INJECTION, SOLUTION INTRAMUSCULAR; INTRAVENOUS; SUBCUTANEOUS EVERY 5 MIN PRN
Status: DISCONTINUED | OUTPATIENT
Start: 2021-06-07 | End: 2021-06-07 | Stop reason: HOSPADM

## 2021-06-07 RX ORDER — KETOROLAC TROMETHAMINE 15 MG/ML
15 INJECTION, SOLUTION INTRAMUSCULAR; INTRAVENOUS EVERY 6 HOURS
Status: DISCONTINUED | OUTPATIENT
Start: 2021-06-07 | End: 2021-06-08

## 2021-06-07 RX ORDER — BUPIVACAINE HYDROCHLORIDE AND EPINEPHRINE 2.5; 5 MG/ML; UG/ML
INJECTION, SOLUTION INFILTRATION; PERINEURAL AS NEEDED
Status: DISCONTINUED | OUTPATIENT
Start: 2021-06-07 | End: 2021-06-07

## 2021-06-07 RX ORDER — NALOXONE HYDROCHLORIDE 0.4 MG/ML
80 INJECTION, SOLUTION INTRAMUSCULAR; INTRAVENOUS; SUBCUTANEOUS AS NEEDED
Status: DISCONTINUED | OUTPATIENT
Start: 2021-06-07 | End: 2021-06-07 | Stop reason: HOSPADM

## 2021-06-07 RX ORDER — DEXTROSE MONOHYDRATE 25 G/50ML
50 INJECTION, SOLUTION INTRAVENOUS
Status: DISCONTINUED | OUTPATIENT
Start: 2021-06-07 | End: 2021-06-07 | Stop reason: HOSPADM

## 2021-06-07 RX ORDER — ROCURONIUM BROMIDE 10 MG/ML
INJECTION, SOLUTION INTRAVENOUS AS NEEDED
Status: DISCONTINUED | OUTPATIENT
Start: 2021-06-07 | End: 2021-06-07 | Stop reason: SURG

## 2021-06-07 RX ORDER — HYDROMORPHONE HYDROCHLORIDE 1 MG/ML
0.4 INJECTION, SOLUTION INTRAMUSCULAR; INTRAVENOUS; SUBCUTANEOUS EVERY 5 MIN PRN
Status: DISCONTINUED | OUTPATIENT
Start: 2021-06-07 | End: 2021-06-07 | Stop reason: HOSPADM

## 2021-06-07 RX ORDER — ONDANSETRON 2 MG/ML
INJECTION INTRAMUSCULAR; INTRAVENOUS AS NEEDED
Status: DISCONTINUED | OUTPATIENT
Start: 2021-06-07 | End: 2021-06-07 | Stop reason: SURG

## 2021-06-07 RX ORDER — MORPHINE SULFATE 2 MG/ML
INJECTION, SOLUTION INTRAMUSCULAR; INTRAVENOUS
Status: DISPENSED
Start: 2021-06-07 | End: 2021-06-08

## 2021-06-07 RX ORDER — SODIUM CHLORIDE, SODIUM LACTATE, POTASSIUM CHLORIDE, CALCIUM CHLORIDE 600; 310; 30; 20 MG/100ML; MG/100ML; MG/100ML; MG/100ML
INJECTION, SOLUTION INTRAVENOUS CONTINUOUS
Status: DISCONTINUED | OUTPATIENT
Start: 2021-06-07 | End: 2021-06-07 | Stop reason: HOSPADM

## 2021-06-07 RX ORDER — CEFAZOLIN SODIUM/WATER 2 G/20 ML
2 SYRINGE (ML) INTRAVENOUS ONCE
Status: COMPLETED | OUTPATIENT
Start: 2021-06-07 | End: 2021-06-07

## 2021-06-07 RX ORDER — DEXAMETHASONE SODIUM PHOSPHATE 4 MG/ML
VIAL (ML) INJECTION AS NEEDED
Status: DISCONTINUED | OUTPATIENT
Start: 2021-06-07 | End: 2021-06-07 | Stop reason: SURG

## 2021-06-07 RX ORDER — MORPHINE SULFATE 2 MG/ML
2 INJECTION, SOLUTION INTRAMUSCULAR; INTRAVENOUS EVERY 10 MIN PRN
Status: DISCONTINUED | OUTPATIENT
Start: 2021-06-07 | End: 2021-06-07 | Stop reason: HOSPADM

## 2021-06-07 RX ADMIN — ROCURONIUM BROMIDE 10 MG: 10 INJECTION, SOLUTION INTRAVENOUS at 14:43:00

## 2021-06-07 RX ADMIN — ROCURONIUM BROMIDE 40 MG: 10 INJECTION, SOLUTION INTRAVENOUS at 14:07:00

## 2021-06-07 RX ADMIN — EPHEDRINE SULFATE 5 MG: 50 INJECTION, SOLUTION INTRAVENOUS at 14:19:00

## 2021-06-07 RX ADMIN — SODIUM CHLORIDE, SODIUM LACTATE, POTASSIUM CHLORIDE, CALCIUM CHLORIDE: 600; 310; 30; 20 INJECTION, SOLUTION INTRAVENOUS at 14:00:00

## 2021-06-07 RX ADMIN — LIDOCAINE HYDROCHLORIDE 25 MG: 10 INJECTION, SOLUTION EPIDURAL; INFILTRATION; INTRACAUDAL; PERINEURAL at 14:03:00

## 2021-06-07 RX ADMIN — SODIUM CHLORIDE, SODIUM LACTATE, POTASSIUM CHLORIDE, CALCIUM CHLORIDE: 600; 310; 30; 20 INJECTION, SOLUTION INTRAVENOUS at 15:35:00

## 2021-06-07 RX ADMIN — PHENYLEPHRINE HCL 100 MCG: 10 MG/ML VIAL (ML) INJECTION at 14:21:00

## 2021-06-07 RX ADMIN — GLYCOPYRROLATE 0.1 MG: 0.2 INJECTION, SOLUTION INTRAMUSCULAR; INTRAVENOUS at 14:06:00

## 2021-06-07 RX ADMIN — CEFAZOLIN SODIUM/WATER 2 G: 2 G/20 ML SYRINGE (ML) INTRAVENOUS at 14:03:00

## 2021-06-07 RX ADMIN — DEXAMETHASONE SODIUM PHOSPHATE 4 MG: 4 MG/ML VIAL (ML) INJECTION at 14:13:00

## 2021-06-07 RX ADMIN — SODIUM CHLORIDE, SODIUM LACTATE, POTASSIUM CHLORIDE, CALCIUM CHLORIDE: 600; 310; 30; 20 INJECTION, SOLUTION INTRAVENOUS at 15:14:00

## 2021-06-07 RX ADMIN — LIDOCAINE HYDROCHLORIDE 25 MG: 10 INJECTION, SOLUTION EPIDURAL; INFILTRATION; INTRACAUDAL; PERINEURAL at 14:06:00

## 2021-06-07 RX ADMIN — ONDANSETRON 4 MG: 2 INJECTION INTRAMUSCULAR; INTRAVENOUS at 15:34:00

## 2021-06-07 NOTE — OPERATIVE REPORT
Primo Ards / [place] Look  Gerardo Martinez / Bari Bell / Sharlene Cunningham / Kolby Ellis / [facility] - [phone]  Answering Service [phone]        Date: 6/7/2021  Preop Diagnosis: Morbid Obesity secondary to excess calories   Postop Ruth Carver was taken to the operating room and placed in a supine fashion on the table. A general anesthetic and the patient was intubated without incident. Pre-operative antibiotics were given.   The abdomen was prepped and draped in the usual sterile fashion and a green loads with seamguard. Following that a small approximately 4 oz pouch was created using sequential 60 mm blue loads. The chris limb was brought up without tension and an enterotomy made to allow access for a 25 EEA stapler.   This was brought through

## 2021-06-07 NOTE — H&P
Melanie Del Toro / Elfego Gentile / Yancy Carlisle / Shivani Abbasi / Amanda Stark / [facility] - [phone]  Answering Service [phone]      Yenifer Fan Patient Status:  Inpatient    1985 MRN [id number]   Location EL 40-49.9 (morbid obesity) ([facility] [address] 2017   • BONNIE (obstructive sleep apnea)    • Sleep apnea       Past Surgical History:   Procedure Laterality Date   • CHOLECYSTECTOMY  2003   • D & C  04/29/2016   • REMOVAL GALLBLADDER        Multiple Vitamin (MULTIVITAMIN TA nerves grossly intact  Psych: alert and oriented, normal affect  Skin: warm, dry    Data Review: CBC:   Lab Results   Component Value Date    WBC 9.4 11/25/2020    RBC 4.65 11/25/2020     BMP:   Lab Results   Component Value Date    CO2 28.0 11/25/2020 drug monitoring         Date Noted: 12/05/2019      Leukocytosis, unspecified type         Date Noted: 03/23/2018      Tenosynovitis, wrist         Date Noted: 11/21/2017      Morbid obesity with BMI of 40.0-44.9, adult [facility])         Date Noted: 02/22/2017 2014        Years since quittin.0      Smokeless tobacco: Never Used    Alcohol use: Yes      Comment: occasionally      Family History   Problem Relation Age of Onset   • Diabetes Mother    • Kidney Disease Mother    • Hypertension Mother    • Ps medical problems and not to obtain a specific body mass index. She understands the risks and benefits and wishes to proceed with the procedure. She has signed a consent form. Date of Surgery Update (To be completed by Attending Surgeon day of surgery. )

## 2021-06-07 NOTE — RESPIRATORY THERAPY NOTE
BONNIE ASSESSMENT:    Pt does have a previous diagnosis of BONNIE. Pt does routinely use a CPAP device at home. This pt is suspected to be at high risk for BONNIE and sleep lab packet was not provided to patient for outpatient follow-up.     CPAP INITIATION:    Pt t

## 2021-06-07 NOTE — PROGRESS NOTES
[facility]    Progress Note    Angel Casper Patient Status:  Inpatient    1985 MRN [id number]   Location [address] UNIT Attending Anastasia Quintero, [address] [facility] Day # 0 PCP Redd Paez.  DO Jen     HPI/Grove HGB 14.7 11/25/2020    HCT 43.6 11/25/2020    .0 11/25/2020    CREATSERUM 0.89 11/25/2020    BUN 11 11/25/2020     11/25/2020    K 4.9 11/25/2020     11/25/2020    CO2 28.0 11/25/2020     (H) 11/25/2020    CA 9.5 11/25/2020    ALB

## 2021-06-07 NOTE — ANESTHESIA PROCEDURE NOTES
Airway  Urgency: Elective      General Information and Staff    Patient location during procedure: OR  Anesthesiologist: Enrique Echeverria MD  Resident/CRNA: Caprice Turcios CRNA  Performed: CRNA     Indications and Patient Condition  Indications for ai

## 2021-06-07 NOTE — ANESTHESIA PREPROCEDURE EVALUATION
Anesthesia PreOp Note    HPI:     Maru Valente is a 38year old female who presents for preoperative consultation requested by:  America Shah MD    Date of Surgery: 6/7/2021    Procedure(s):  laparoscopic possible open chris-en-y gastric bypass  Indicati diabetes, newly diagnosed  RECOMMENDATIONS: Continue care with Dr. Roderick Peralta   • OA (osteoarthritis)    • Obesity (BMI 30-39. 9)    • Obesity, Class III, BMI 40-49.9 (morbid obesity) ([facility] 2017   • BONNIE (obstructive sleep apnea)    • Sleep apnea        Past Surgi Highest education level: Not on file    Occupational History      Not on file    Tobacco Use      Smoking status: Former Smoker        Packs/day: 0.10        Years: 1.00        Pack years: .1        Types: Cigarettes        Quit date: 5/17/2014        Seng Machado Organizations:       Attends Club or Organization Meetings:       Marital Status:   Intimate Partner Violence:       Fear of Current or Ex-Partner:       Emotionally Abused:       Physically Abused:       Sexually Abused:     Available pre-op labs reviewed

## 2021-06-08 ENCOUNTER — DOCUMENTATION ONLY (OUTPATIENT)
Dept: SURGERY | Facility: CLINIC | Age: 36
End: 2021-06-08

## 2021-06-08 VITALS
DIASTOLIC BLOOD PRESSURE: 65 MMHG | WEIGHT: 222.81 LBS | SYSTOLIC BLOOD PRESSURE: 125 MMHG | BODY MASS INDEX: 38.04 KG/M2 | OXYGEN SATURATION: 98 % | HEART RATE: 112 BPM | HEIGHT: 64 IN | TEMPERATURE: 98 F | RESPIRATION RATE: 18 BRPM

## 2021-06-08 PROCEDURE — 99239 HOSP IP/OBS DSCHRG MGMT >30: CPT | Performed by: HOSPITALIST

## 2021-06-08 RX ORDER — OXYCODONE HYDROCHLORIDE 5 MG/1
5 TABLET ORAL EVERY 6 HOURS PRN
Qty: 10 TABLET | Refills: 0 | Status: SHIPPED | OUTPATIENT
Start: 2021-06-08 | End: 2021-06-30

## 2021-06-08 RX ORDER — ACETAMINOPHEN 160 MG/5ML
1000 SOLUTION ORAL EVERY 8 HOURS
Qty: 473 ML | Refills: 0 | Status: SHIPPED | OUTPATIENT
Start: 2021-06-08 | End: 2021-10-26 | Stop reason: ALTCHOICE

## 2021-06-08 RX ORDER — PANTOPRAZOLE SODIUM 40 MG/1
40 TABLET, DELAYED RELEASE ORAL
Qty: 90 TABLET | Refills: 0 | Status: SHIPPED | OUTPATIENT
Start: 2021-06-09 | End: 2021-10-26 | Stop reason: ALTCHOICE

## 2021-06-08 NOTE — DISCHARGE SUMMARY
[facility] - [facility]    Discharge Summary    Mary Danyelllaura Patient Status:  Inpatient    1985 MRN [id number]   Location [facility] 4W/SW/SE Attending No att. providers found   [address] Day # 1 PCP Letty Soto.  Jen,      Date of Adm Laparoscopic Guanako-en-Y Gastric Bypass, s/p Resection of gastric mass EGD, TAP block    Complications: n/a    Discharge Condition: Good    Discharge Medications:      Discharge Medications      START taking these medications      Instructions Prescription d

## 2021-06-08 NOTE — PROGRESS NOTES
Nadir Hirschist / Lisa Pat / Ayaka Holm / Will Haywood / Contreras Ventura / Chasity Santoss - [phone]  Answering Service [phone]      Progress Note    Rafael Beavers Patient Status:  Inpatient    1985 MRN [id number] 11/25/2020    CO2 28.0 11/25/2020    BUN 11 11/25/2020     (H) 11/25/2020       Imaging:  No results found. Assessment/Plan:  S/p gastric bypass looks great. Home this am.  WBC reactive.   F/u dr Angelic Wrighthouse 2 weeks    Za Casey MD  6/8/2021  7:23

## 2021-06-08 NOTE — BH NUTRITION NOTE
Bariatric Inpatient Nutrition Note      Omar Mo is a 38year old female.     Procedure: Laparoscopic gastric bypass surgery  Surgery Date: 6/7/2021  Medical Diagnosis: Morbid obesity    Height:  Ht Readings from Last 1 Encounters:  06/07/21 : 5' infusion, , Intravenous, Continuous  Enoxaparin Sodium (LOVENOX) 40 MG/0.4ML injection 40 mg, 40 mg, Subcutaneous, Daily  Pantoprazole Sodium (PROTONIX) EC tab 40 mg, 40 mg, Oral, QAM AC  acetaminophen (TYLENOL) 160 MG/5ML oral liquid 1,000 mg, 1,000 mg, O

## 2021-06-08 NOTE — PLAN OF CARE
Sharonda is doing well. She is ambulating with supervision. No nausea or vomiting. Room air but wears CPAP at night. She is voiding well. Her pain is managed with scheduled meds. She is planning to discharge home today once medically cleared.

## 2021-06-16 ENCOUNTER — OFFICE VISIT (OUTPATIENT)
Dept: SURGERY | Facility: CLINIC | Age: 36
End: 2021-06-16
Payer: COMMERCIAL

## 2021-06-16 ENCOUNTER — DOCUMENTATION ONLY (OUTPATIENT)
Dept: SURGERY | Facility: CLINIC | Age: 36
End: 2021-06-16

## 2021-06-16 VITALS — BODY MASS INDEX: 36.02 KG/M2 | HEART RATE: 102 BPM | WEIGHT: 211 LBS | OXYGEN SATURATION: 99 % | HEIGHT: 64 IN

## 2021-06-16 DIAGNOSIS — Z98.84 S/P GASTRIC BYPASS: ICD-10-CM

## 2021-06-16 DIAGNOSIS — E66.01 MORBID (SEVERE) OBESITY DUE TO EXCESS CALORIES (HCC): Primary | ICD-10-CM

## 2021-06-16 NOTE — L&D DELIVERY NOTE
Last vitals:   Vitals:    01/10/20 0837   BP: 94/55   Pulse: 78   Resp: 14   Temp: 36.5  C (97.7  F)   SpO2: 100%     Pt brought to PACU on 10L facemask. Monitors applied. VSS upon arrival.    Patient's level of consciousness is drowsy  Spontaneous respirations: yes  Maintains airway independently: yes  Dentition unchanged: yes  Oropharynx: oropharynx clear of all foreign objects    QCDR Measures:  ASA# 20 - Surgical Safety Checklist: WHO surgical safety checklist completed prior to induction    PQRS# 430 - Adult PONV Prevention: 4558F - Pt received => 2 anti-emetic agents (different classes) preop & intraop  ASA# 8 - Peds PONV Prevention: NA - Not pediatric patient, not GA or 2 or more risk factors NOT present  PQRS# 424 - Lashanda-op Temp Management: 4559F - At least one body temp DOCUMENTED => 35.5C or 95.9F within required timeframe  PQRS# 426 - PACU Transfer Protocol: - Transfer of care checklist used  ASA# 14 - Acute Post-op Pain: ASA14B - Patient did NOT experience pain >= 7 out of 10  
[facility] - [facility]    Vaginal Delivery Note    Radha Kunz Patient Status:  Inpatient    1985 MRN [id number]   Location [unfilled] Attending Belén Prater MD   Hosp Day # 0 PCP Magali Dodd.  Shey Escobar DO     Delivery     Infant  Date of
IV discontinued, cath removed intact

## 2021-06-16 NOTE — PROGRESS NOTES
Provided/reviewed bariatric post-op orientation and Bariatric HELP card; instructed pt to aim for 64 ozs fluids/day; taking Bariatric Choice 4x/day; meds to take/avoid; activities/allowed/avoid; signs and symptoms of concern; contact info; also instructed

## 2021-06-17 PROBLEM — Z98.84 S/P GASTRIC BYPASS: Status: ACTIVE | Noted: 2021-06-17

## 2021-06-17 PROBLEM — Z98.84 S/P LAPAROSCOPIC SLEEVE GASTRECTOMY: Status: RESOLVED | Noted: 2021-06-17 | Resolved: 2021-06-17

## 2021-06-17 PROBLEM — Z98.84 S/P LAPAROSCOPIC SLEEVE GASTRECTOMY: Status: ACTIVE | Noted: 2021-06-17

## 2021-06-17 NOTE — PROGRESS NOTES
[address] [facility], [address]  [address] [facility] [address]  Dept: [phone]    6/17/2021   Bariatric Patient Follow-up Evaluation    Chief Complaint:  morbid obesity, preop 233, rygb/ma Onset   • Diabetes Mother    • Kidney Disease Mother    • Hypertension Mother    • Psychiatric Mother         Depression   • Diabetes Maternal Grandmother        Social History:  Social History    Socioeconomic History      Marital status: Single      Spou Breastfeeding No   BMI 36.22 kg/m²   BMI:  Body mass index is 36.22 kg/m².   General: no acute distress, well-nourished  HENT: normocephalic, atraumatic  Lung: breathing comfortably, symmetrical expansion, no wheezing  Heart: regular rate and rhythm  Abdo

## 2021-06-28 ENCOUNTER — TELEPHONE (OUTPATIENT)
Dept: SURGERY | Facility: CLINIC | Age: 36
End: 2021-06-28

## 2021-06-30 ENCOUNTER — OFFICE VISIT (OUTPATIENT)
Dept: SURGERY | Facility: CLINIC | Age: 36
End: 2021-06-30
Payer: COMMERCIAL

## 2021-06-30 ENCOUNTER — HOSPITAL ENCOUNTER (OUTPATIENT)
Facility: HOSPITAL | Age: 36
Discharge: HOME | End: 2021-06-30
Attending: SINGLE SPECIALTY | Admitting: SINGLE SPECIALTY
Payer: COMMERCIAL

## 2021-06-30 VITALS
HEIGHT: 64 IN | DIASTOLIC BLOOD PRESSURE: 83 MMHG | SYSTOLIC BLOOD PRESSURE: 125 MMHG | OXYGEN SATURATION: 98 % | HEART RATE: 83 BPM | WEIGHT: 208 LBS | BODY MASS INDEX: 35.51 KG/M2

## 2021-06-30 DIAGNOSIS — Z98.84 S/P GASTRIC BYPASS: Primary | ICD-10-CM

## 2021-06-30 RX ORDER — SODIUM CHLORIDE 9 MG/ML
1000 INJECTION, SOLUTION INTRAVENOUS ONCE
Status: COMPLETED | OUTPATIENT
Start: 2021-06-30 | End: 2021-06-30

## 2021-06-30 NOTE — PROGRESS NOTES
[address] [facility], [address]  [address] [address] [address]  Dept: [phone]    6/30/2021   Bariatric Patient Follow-up Evaluation    Chief Complaint:  morbid obesity, preop 233, rygb/ma Age of Onset   • Diabetes Mother    • Kidney Disease Mother    • Hypertension Mother    • Psychiatric Mother         Depression   • Diabetes Maternal Grandmother        Social History:  Social History    Socioeconomic History      Marital status: Single LMP 05/12/2021 (Exact Date)   SpO2 98%   Breastfeeding No   BMI 35.70 kg/m²   BMI:  Body mass index is 35.7 kg/m².   General: no acute distress, well-nourished  HENT: normocephalic, atraumatic  Lung: breathing comfortably, symmetrical expansion, no wheezing

## 2021-06-30 NOTE — PLAN OF CARE
REc'd pt via w/c for iv fluids, iv access obtained and pt given 1 liter over two hours. Pt states she is feeling better. Iv removed and pt discharged in good condition.

## 2021-07-06 ENCOUNTER — OFFICE VISIT (OUTPATIENT)
Dept: SURGERY | Facility: CLINIC | Age: 36
End: 2021-07-06
Payer: COMMERCIAL

## 2021-07-06 VITALS — WEIGHT: 202.31 LBS | BODY MASS INDEX: 34.54 KG/M2 | HEIGHT: 64 IN

## 2021-07-06 DIAGNOSIS — E66.09 CLASS 1 OBESITY DUE TO EXCESS CALORIES WITH SERIOUS COMORBIDITY AND BODY MASS INDEX (BMI) OF 34.0 TO 34.9 IN ADULT: Primary | ICD-10-CM

## 2021-07-06 PROCEDURE — 97803 MED NUTRITION INDIV SUBSEQ: CPT

## 2021-07-06 PROCEDURE — 3008F BODY MASS INDEX DOCD: CPT

## 2021-07-06 NOTE — PROGRESS NOTES
[address] [facility]  [place] AND WEIGHT LOSS CLINIC  [address] [place] [address]  Dept: [phone]  Loc: [phone]    07/06/21      Bariatric Follow-up Nutrition Session    Yenifer Fan is a 39year old female.      As B12 432 03/04/2021     Lab Results   Component Value Date    VITD 14.6 (L) 03/04/2021     Lab Results   Component Value Date/Time    THIAMINE 122 03/04/2021 09:11 AM      No results found for: VITB1  Lab Results   Component Value Date/Time    FOLIC 77.8 03 diet: no, Pt downgraded diet to fulls due to pain levels, inability to make additional foods such as pureed/softs due to lack of energy. Food intolerances:  None at this time.    Vitamin/mineral supplements:  Bariatric Choice  Protein supplements:  [employer]? Continue to be seen due to issues with meeting fluids and constipation issues. Patient understands protein requirements? Yes, meeting needs via protein shakes. Patient understand fluid requirements (amount and method of intake)?  Yes, currently

## 2021-07-08 ENCOUNTER — OFFICE VISIT (OUTPATIENT)
Dept: SURGERY | Facility: CLINIC | Age: 36
End: 2021-07-08
Payer: COMMERCIAL

## 2021-07-08 VITALS
SYSTOLIC BLOOD PRESSURE: 128 MMHG | DIASTOLIC BLOOD PRESSURE: 88 MMHG | OXYGEN SATURATION: 99 % | HEART RATE: 87 BPM | BODY MASS INDEX: 34.49 KG/M2 | HEIGHT: 64 IN | WEIGHT: 202 LBS

## 2021-07-08 DIAGNOSIS — E66.9 OBESITY (BMI 30-39.9): ICD-10-CM

## 2021-07-08 DIAGNOSIS — Z98.84 S/P GASTRIC BYPASS: Primary | ICD-10-CM

## 2021-07-08 PROCEDURE — 3008F BODY MASS INDEX DOCD: CPT | Performed by: INTERNAL MEDICINE

## 2021-07-08 PROCEDURE — 3079F DIAST BP 80-89 MM HG: CPT | Performed by: INTERNAL MEDICINE

## 2021-07-08 PROCEDURE — 99214 OFFICE O/P EST MOD 30 MIN: CPT | Performed by: INTERNAL MEDICINE

## 2021-07-08 PROCEDURE — 3074F SYST BP LT 130 MM HG: CPT | Performed by: INTERNAL MEDICINE

## 2021-07-08 NOTE — PROGRESS NOTES
[address]  Dept: [phone]    Patient:  Jasper Orozco  :      1985  MRN:      [id number]    Referring Provider: Thom Ruiz 2014        Years since quittin.1      Smokeless tobacco: Never Used    Vaping Use      Vaping Use: Never used    Alcohol use: Yes      Comment: occasionally     Drug use: No    Surgical History:    Past Surgical History:   Procedure Laterality Da morelia    Taking liquid pain med with some relief.   Scheduled to see Dr Kp Glez next week  Aware to come or call if pain worsens      Keeping up with fluids and protein  Continue MVI    Blood work due In October      Bruce Suggs MD

## 2021-07-14 ENCOUNTER — OFFICE VISIT (OUTPATIENT)
Dept: SURGERY | Facility: CLINIC | Age: 36
End: 2021-07-14
Payer: COMMERCIAL

## 2021-07-14 VITALS
BODY MASS INDEX: 33.86 KG/M2 | WEIGHT: 198.31 LBS | DIASTOLIC BLOOD PRESSURE: 84 MMHG | HEART RATE: 89 BPM | SYSTOLIC BLOOD PRESSURE: 121 MMHG | HEIGHT: 64 IN | OXYGEN SATURATION: 97 %

## 2021-07-14 NOTE — PROGRESS NOTES
[address] , [address]  [facility] [address]  Dept: [phone]    7/14/2021   Bariatric Patient Follow-up Evaluation    Chief Complaint:  morbid obesity, preop 233, rygb/ma C  04/29/2016   • LAP GASTRIC BYPASS/EMERSON-EN-Y  06/07/2021    Dr Severo Meeter, [facility]   • REMOVAL GALLBLADDER         Family History:    Family History   Problem Relation Age of Onset   • Diabetes Mother    • Kidney Disease Mother    • Hypertension Moth negative  Endocrine: negative  Immunologic: negative  Integumentary: negative      Physical Exam:  Vital Signs:  /84   Pulse 89   Ht 5' 4\" (1.626 m)   Wt 198 lb 4.8 oz (89.9 kg)   LMP 05/12/2021 (Exact Date)   SpO2 97%   BMI 34.04 kg/m²   BMI:  Body

## 2021-09-22 ENCOUNTER — OFFICE VISIT (OUTPATIENT)
Dept: SURGERY | Facility: CLINIC | Age: 36
End: 2021-09-22
Payer: COMMERCIAL

## 2021-09-22 VITALS
OXYGEN SATURATION: 98 % | BODY MASS INDEX: 32.1 KG/M2 | HEART RATE: 76 BPM | SYSTOLIC BLOOD PRESSURE: 122 MMHG | HEIGHT: 64 IN | WEIGHT: 188 LBS | DIASTOLIC BLOOD PRESSURE: 76 MMHG

## 2021-09-22 DIAGNOSIS — G47.33 OSA (OBSTRUCTIVE SLEEP APNEA): Primary | ICD-10-CM

## 2021-09-22 DIAGNOSIS — Z98.84 S/P GASTRIC BYPASS: ICD-10-CM

## 2021-09-22 DIAGNOSIS — E66.01 MORBID (SEVERE) OBESITY DUE TO EXCESS CALORIES (HCC): ICD-10-CM

## 2021-09-22 NOTE — PROGRESS NOTES
[address] [facility], [address]  [address] [address] [address]  Dept: [phone]    9/22/2021   Bariatric Patient Follow-up Evaluation    Chief Complaint:  morbid obesity, preop 233, rygb/ma History   Problem Relation Age of Onset   • Diabetes Mother    • Kidney Disease Mother    • Hypertension Mother    • Psychiatric Mother         Depression   • Diabetes Maternal Grandmother        Social History:  Social History    Socioeconomic History kg/m².   General: no acute distress, well-nourished  HENT: normocephalic, atraumatic  Lung: breathing comfortably, symmetrical expansion, no wheezing  Heart: regular rate and rhythm  Abdomen: soft, nontender, mildly retracted left lateral incision, non-dist

## 2021-10-07 ENCOUNTER — TELEPHONE (OUTPATIENT)
Dept: SURGERY | Facility: CLINIC | Age: 36
End: 2021-10-07

## 2021-10-07 NOTE — TELEPHONE ENCOUNTER
Appointment audit/chart review phone call per protocol. Per records, patient has appointments as follows:  Surgeon 09/22/2021  Dietician 7/09/2021  [address] [facility] 07/08/2021  Patient has not  been hospitalized or visited any ED since surgery.

## 2021-10-20 ENCOUNTER — OFFICE VISIT (OUTPATIENT)
Dept: SURGERY | Facility: CLINIC | Age: 36
End: 2021-10-20
Payer: COMMERCIAL

## 2021-10-20 VITALS — BODY MASS INDEX: 31.56 KG/M2 | HEIGHT: 64 IN | WEIGHT: 184.88 LBS

## 2021-10-20 DIAGNOSIS — G47.33 OSA (OBSTRUCTIVE SLEEP APNEA): ICD-10-CM

## 2021-10-20 DIAGNOSIS — E66.9 OBESITY (BMI 30-39.9): Primary | ICD-10-CM

## 2021-10-20 DIAGNOSIS — Z98.84 S/P GASTRIC BYPASS: ICD-10-CM

## 2021-10-20 DIAGNOSIS — M15.9 PRIMARY OSTEOARTHRITIS INVOLVING MULTIPLE JOINTS: ICD-10-CM

## 2021-10-20 PROCEDURE — 97803 MED NUTRITION INDIV SUBSEQ: CPT

## 2021-10-20 PROCEDURE — 3008F BODY MASS INDEX DOCD: CPT

## 2021-10-20 NOTE — PROGRESS NOTES
[address] [place] [address] AND WEIGHT LOSS CLINIC  [address] [place] [address]  Dept: [phone]  Loc: [phone]    10/20/21      Bariatric Follow-up Nutrition Session    Jesus Mejia is a 39year old female.      As Value Date    VITD 14.6 (L) 03/04/2021     Lab Results   Component Value Date/Time    THIAMINE 122 03/04/2021 09:11 AM      No results found for: VITB1  Lab Results   Component Value Date/Time    FOLIC 52.5 68/91/3865 20:70 AM        Meds:     Current Outp dinner)   · Exery other day doing exercise vids with dumbells. · Duration: 30-45 minutes  · Frequency: per day    Other:  Pt is about 4 months post op rygb/mass resection 6/7/21. Pt is finding her limit and taking her time and knowing portion sizes.  Pt h

## 2021-10-20 NOTE — PATIENT INSTRUCTIONS
Recommendations/goals:    1. Track as needed to stay on track. 2. Continue to aim for >60 grams of protein a day, adequate ~64 oz of water. 3. Switch up eating vs walking, try walking first and then eating. 4. Continue with exercise as well.  Continue

## 2021-10-26 ENCOUNTER — OFFICE VISIT (OUTPATIENT)
Dept: SURGERY | Facility: CLINIC | Age: 36
End: 2021-10-26
Payer: COMMERCIAL

## 2021-10-26 VITALS
WEIGHT: 184.69 LBS | OXYGEN SATURATION: 98 % | HEART RATE: 76 BPM | HEIGHT: 64 IN | DIASTOLIC BLOOD PRESSURE: 73 MMHG | SYSTOLIC BLOOD PRESSURE: 102 MMHG | BODY MASS INDEX: 31.53 KG/M2

## 2021-10-26 DIAGNOSIS — R73.09 ABNORMAL BLOOD SUGAR: ICD-10-CM

## 2021-10-26 DIAGNOSIS — E88.81 INSULIN RESISTANCE: Primary | ICD-10-CM

## 2021-10-26 DIAGNOSIS — Z98.84 S/P GASTRIC BYPASS: ICD-10-CM

## 2021-10-26 DIAGNOSIS — E55.9 VITAMIN D DEFICIENCY: ICD-10-CM

## 2021-10-26 DIAGNOSIS — E66.9 OBESITY (BMI 30-39.9): ICD-10-CM

## 2021-10-26 PROBLEM — E88.819 INSULIN RESISTANCE: Status: ACTIVE | Noted: 2021-10-26

## 2021-10-26 PROCEDURE — 99214 OFFICE O/P EST MOD 30 MIN: CPT | Performed by: INTERNAL MEDICINE

## 2021-10-26 PROCEDURE — 3074F SYST BP LT 130 MM HG: CPT | Performed by: INTERNAL MEDICINE

## 2021-10-26 PROCEDURE — 3078F DIAST BP <80 MM HG: CPT | Performed by: INTERNAL MEDICINE

## 2021-10-26 PROCEDURE — 3008F BODY MASS INDEX DOCD: CPT | Performed by: INTERNAL MEDICINE

## 2021-11-15 ENCOUNTER — OFFICE VISIT (OUTPATIENT)
Dept: FAMILY MEDICINE CLINIC | Facility: CLINIC | Age: 36
End: 2021-11-15
Payer: COMMERCIAL

## 2021-11-15 VITALS
OXYGEN SATURATION: 98 % | RESPIRATION RATE: 18 BRPM | BODY MASS INDEX: 30.9 KG/M2 | HEIGHT: 64 IN | SYSTOLIC BLOOD PRESSURE: 111 MMHG | WEIGHT: 181 LBS | DIASTOLIC BLOOD PRESSURE: 75 MMHG | HEART RATE: 92 BPM

## 2021-11-15 DIAGNOSIS — N63.0 BREAST LUMP: ICD-10-CM

## 2021-11-15 DIAGNOSIS — Z30.41 SURVEILLANCE FOR BIRTH CONTROL, ORAL CONTRACEPTIVES: ICD-10-CM

## 2021-11-15 DIAGNOSIS — Z00.00 ROUTINE GENERAL MEDICAL EXAMINATION AT A HEALTH CARE FACILITY: Primary | ICD-10-CM

## 2021-11-15 PROBLEM — E66.01 MORBID OBESITY (HCC): Status: RESOLVED | Noted: 2017-02-22 | Resolved: 2021-11-15

## 2021-11-15 PROBLEM — Z98.84 S/P GASTRIC BYPASS: Status: RESOLVED | Noted: 2021-06-17 | Resolved: 2021-11-15

## 2021-11-15 PROBLEM — R63.5 WEIGHT GAIN: Status: RESOLVED | Noted: 2017-01-01 | Resolved: 2021-11-15

## 2021-11-15 PROCEDURE — 3074F SYST BP LT 130 MM HG: CPT | Performed by: PHYSICIAN ASSISTANT

## 2021-11-15 PROCEDURE — 3008F BODY MASS INDEX DOCD: CPT | Performed by: PHYSICIAN ASSISTANT

## 2021-11-15 PROCEDURE — 99395 PREV VISIT EST AGE 18-39: CPT | Performed by: PHYSICIAN ASSISTANT

## 2021-11-15 PROCEDURE — 3078F DIAST BP <80 MM HG: CPT | Performed by: PHYSICIAN ASSISTANT

## 2021-11-15 RX ORDER — ACETAMINOPHEN AND CODEINE PHOSPHATE 120; 12 MG/5ML; MG/5ML
0.35 SOLUTION ORAL DAILY
Qty: 84 TABLET | Refills: 1 | Status: SHIPPED | OUTPATIENT
Start: 2021-11-15

## 2021-11-15 NOTE — PROGRESS NOTES
HPI:    Patient ID: Sharon Marrufo is a 39year old female. Patient is here for routine physical exam. No acute issues. No significant chronic medical problems. Patient is requesting testing. Diet and exercise have been fair.  Past medical history, fam Vitamins-Minerals (BARIATRIC MULTIVITAMINS/IRON OR) Take 1 tablet by mouth 4 (four) times daily.        Allergies:No Known Allergies   /75 (BP Location: Right arm, Patient Position: Sitting, Cuff Size: adult)   Pulse 92   Resp 18   Ht 5' 4\" (1.626 m) Palpations: Abdomen is soft. Tenderness: There is no abdominal tenderness. There is no guarding or rebound. Musculoskeletal:         General: Normal range of motion. Cervical back: Normal range of motion and neck supple.    Lymphadenopathy: Encounter      Comp Metabolic Panel (14)      CBC With Differential With Platelet      Lipid Panel      TSH W Reflex To Free T4      Meds This Visit:  Requested Prescriptions     Signed Prescriptions Disp Refills   • Norethindrone 0.35 MG Oral Tab 84 table

## 2021-11-27 ENCOUNTER — LAB ENCOUNTER (OUTPATIENT)
Dept: LAB | Facility: HOSPITAL | Age: 36
End: 2021-11-27
Attending: INTERNAL MEDICINE
Payer: COMMERCIAL

## 2021-11-27 DIAGNOSIS — E55.9 VITAMIN D DEFICIENCY: ICD-10-CM

## 2021-11-27 DIAGNOSIS — E66.9 OBESITY (BMI 30-39.9): ICD-10-CM

## 2021-11-27 DIAGNOSIS — Z98.84 S/P GASTRIC BYPASS: ICD-10-CM

## 2021-11-27 DIAGNOSIS — Z00.00 ROUTINE GENERAL MEDICAL EXAMINATION AT A HEALTH CARE FACILITY: ICD-10-CM

## 2021-11-27 DIAGNOSIS — R73.09 ABNORMAL BLOOD SUGAR: ICD-10-CM

## 2021-11-27 DIAGNOSIS — E88.81 INSULIN RESISTANCE: ICD-10-CM

## 2021-11-27 PROCEDURE — 82607 VITAMIN B-12: CPT

## 2021-11-27 PROCEDURE — 36415 COLL VENOUS BLD VENIPUNCTURE: CPT | Performed by: PHYSICIAN ASSISTANT

## 2021-11-27 PROCEDURE — 84425 ASSAY OF VITAMIN B-1: CPT

## 2021-11-27 PROCEDURE — 80053 COMPREHEN METABOLIC PANEL: CPT

## 2021-11-27 PROCEDURE — 85025 COMPLETE CBC W/AUTO DIFF WBC: CPT

## 2021-11-27 PROCEDURE — 83036 HEMOGLOBIN GLYCOSYLATED A1C: CPT

## 2021-11-27 PROCEDURE — 84443 ASSAY THYROID STIM HORMONE: CPT | Performed by: PHYSICIAN ASSISTANT

## 2021-11-27 PROCEDURE — 80061 LIPID PANEL: CPT

## 2021-11-27 PROCEDURE — 82306 VITAMIN D 25 HYDROXY: CPT

## 2021-11-30 ENCOUNTER — PATIENT MESSAGE (OUTPATIENT)
Dept: FAMILY MEDICINE CLINIC | Facility: CLINIC | Age: 36
End: 2021-11-30

## 2021-11-30 NOTE — TELEPHONE ENCOUNTER
From: Florecita Orlando  To: Bella Betts PA-C  Sent: 11/30/2021 8:35 AM CST  Subject: Test Results Question    Good morning, following up on my employee wellness screening. Just like to make sure it'll be faxed in before the deadline.  Not sure if it's due

## 2021-12-01 ENCOUNTER — TELEPHONE (OUTPATIENT)
Dept: FAMILY MEDICINE CLINIC | Facility: CLINIC | Age: 36
End: 2021-12-01

## 2021-12-01 NOTE — TELEPHONE ENCOUNTER
Patient notified that  Her Health Provider form was completed and fax to . [facility]   No further action needed

## 2021-12-22 ENCOUNTER — OFFICE VISIT (OUTPATIENT)
Dept: INTEGRATIVE MEDICINE | Facility: CLINIC | Age: 36
End: 2021-12-22

## 2021-12-22 PROCEDURE — 99199 UNLISTED SPECIAL SVC PX/RPRT: CPT

## 2021-12-22 NOTE — PROGRESS NOTES
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 She had several adhesions in her upper back muscles.   She enjoyed the benefits of a full body massage and said that she would schedule again a

## 2021-12-24 ENCOUNTER — NURSE ONLY (OUTPATIENT)
Dept: LAB | Facility: HOSPITAL | Age: 36
End: 2021-12-24
Attending: PREVENTIVE MEDICINE

## 2021-12-24 ENCOUNTER — TELEPHONE (OUTPATIENT)
Dept: INTERNAL MEDICINE CLINIC | Facility: HOSPITAL | Age: 36
End: 2021-12-24

## 2021-12-24 DIAGNOSIS — Z20.822 SUSPECTED COVID-19 VIRUS INFECTION: Primary | ICD-10-CM

## 2021-12-24 DIAGNOSIS — Z20.822 SUSPECTED COVID-19 VIRUS INFECTION: ICD-10-CM

## 2021-12-24 LAB — SARS-COV-2 RNA RESP QL NAA+PROBE: NOT DETECTED

## 2021-12-24 NOTE — TELEPHONE ENCOUNTER
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 (e.g., during meal breaks):  Yes []   No [x]    If yes, who:   Do you share a workspace? Yes [x]   No []       If yes, with whom?   coworkers  Do you have any family members sick at home Alinity    Date test is to be taken:    12/24/21    []  Outside testing       [x] Manager notified    INSTRUCTIONS PROVIDED:     [x] Employee will schedule testing via [employer] or call Central Scheduling at [phone].   Instructed to remain in their vehic

## 2021-12-28 NOTE — TELEPHONE ENCOUNTER
Results and RTW guidelines:    COVID RESULT:    [x] Viewed by employee in [employer]. RTW plan and instructions as indicated on triage call. Manager notified. Estimated RTW date: 12/26/21  [] Discussed with employee   [] Unable to reach by phone.   Sent v

## 2022-01-10 ENCOUNTER — TELEPHONE (OUTPATIENT)
Dept: SURGERY | Facility: CLINIC | Age: 37
End: 2022-01-10

## 2022-01-10 NOTE — TELEPHONE ENCOUNTER
Appointment audit/chart review phone call per protocol. Per records, patient has appointments as follows:    Surgeon 9/22/21, needs 6 month visit. Dietician 11/30/21  Bariatrician 10/26/21, needs 6 month visit.      LVM for pt to schedule for follow u

## 2022-05-19 RX ORDER — ACETAMINOPHEN AND CODEINE PHOSPHATE 120; 12 MG/5ML; MG/5ML
0.35 SOLUTION ORAL DAILY
Qty: 84 TABLET | Refills: 1 | Status: SHIPPED | OUTPATIENT
Start: 2022-05-19

## 2022-05-19 NOTE — TELEPHONE ENCOUNTER
Refill passed per [address] [place] [address] protocol. Last pap 12/5/2019- normal.   Requested Prescriptions   Pending Prescriptions Disp Refills    NORETHINDRONE 0.35 MG Oral Tab [Pharmacy Med Name: NORETHINDRONE 0.35MG TABLETS 28S] 84 tablet 1     Sig: TAKE 1 TABLET(0.35 MG) BY MOUTH DAILY        Gynecology Medication Protocol Failed - 5/19/2022  1:23 PM        Failed - Pass dependent on manual look-up of last PAP and patient compliance with PAP follow up recommendations        Passed - Appointment in past 12 or next 3 months             Recent Outpatient Visits              4 months ago Suspected COVID-19 virus infection    [facility]    Nurse Only    4 months ago     [employer], [facility], [address] [facility] [place]    Office Visit    6 months ago Routine general medical examination at a health care facility    [address] [place] [address], [address] [facility], [employer]    Office Visit    6 months ago Insulin resistance    Zully Maguire MD    Office Visit    7 months ago Obesity (BMI 30-39. 5)    [address] [facility]    Office Visit           Future Appointments         Provider Department Appt Notes    In 3 months Patrick Loomis MD [facility] Ophthalmology NP,EE

## 2022-05-20 ENCOUNTER — HOSPITAL ENCOUNTER (OUTPATIENT)
Age: 37
Discharge: HOME | End: 2022-05-20
Payer: COMMERCIAL

## 2022-05-20 VITALS
HEIGHT: 64 IN | DIASTOLIC BLOOD PRESSURE: 78 MMHG | SYSTOLIC BLOOD PRESSURE: 115 MMHG | OXYGEN SATURATION: 100 % | BODY MASS INDEX: 30.73 KG/M2 | RESPIRATION RATE: 18 BRPM | WEIGHT: 180 LBS | HEART RATE: 75 BPM | TEMPERATURE: 98 F

## 2022-05-20 DIAGNOSIS — Z20.822 ENCOUNTER FOR SCREENING LABORATORY TESTING FOR COVID-19 VIRUS: ICD-10-CM

## 2022-05-20 DIAGNOSIS — R09.89 RUNNY NOSE: Primary | ICD-10-CM

## 2022-05-20 LAB — SARS-COV-2 RNA RESP QL NAA+PROBE: NOT DETECTED

## 2022-05-20 NOTE — ED INITIAL ASSESSMENT (HPI)
Patient is here with a runny nose and slight fatigue that started yesterday.   She wants a covid test.

## 2022-08-05 ENCOUNTER — HOSPITAL ENCOUNTER (OUTPATIENT)
Age: 37
Discharge: HOME | End: 2022-08-05
Payer: COMMERCIAL

## 2022-08-05 DIAGNOSIS — Z20.822 ENCOUNTER FOR SCREENING LABORATORY TESTING FOR COVID-19 VIRUS: Primary | ICD-10-CM

## 2022-08-05 DIAGNOSIS — U07.1 COVID: ICD-10-CM

## 2022-08-05 LAB — SARS-COV-2 RNA RESP QL NAA+PROBE: DETECTED

## 2022-08-05 PROCEDURE — 99213 OFFICE O/P EST LOW 20 MIN: CPT | Performed by: NURSE PRACTITIONER

## 2022-08-05 PROCEDURE — U0002 COVID-19 LAB TEST NON-CDC: HCPCS | Performed by: NURSE PRACTITIONER

## 2022-08-05 NOTE — ED INITIAL ASSESSMENT (HPI)
Pt c/o sore throat, runny nose, cough, fatigue x3 days. States family member tested positive at home today. No fever.

## 2022-08-06 ENCOUNTER — TELEPHONE (OUTPATIENT)
Dept: INTERNAL MEDICINE CLINIC | Facility: HOSPITAL | Age: 37
End: 2022-08-06

## 2022-09-08 ENCOUNTER — OFFICE VISIT (OUTPATIENT)
Dept: OPHTHALMOLOGY | Facility: CLINIC | Age: 37
End: 2022-09-08
Payer: COMMERCIAL

## 2022-09-08 DIAGNOSIS — H52.203 MYOPIA WITH ASTIGMATISM, BILATERAL: Primary | ICD-10-CM

## 2022-09-08 DIAGNOSIS — H52.13 MYOPIA WITH ASTIGMATISM, BILATERAL: Primary | ICD-10-CM

## 2022-09-08 PROCEDURE — 92015 DETERMINE REFRACTIVE STATE: CPT | Performed by: OPHTHALMOLOGY

## 2022-09-08 PROCEDURE — 92004 COMPRE OPH EXAM NEW PT 1/>: CPT | Performed by: OPHTHALMOLOGY

## 2022-10-25 ENCOUNTER — LAB ENCOUNTER (OUTPATIENT)
Dept: LAB | Facility: HOSPITAL | Age: 37
End: 2022-10-25
Attending: PREVENTIVE MEDICINE
Payer: COMMERCIAL

## 2022-10-25 ENCOUNTER — TELEPHONE (OUTPATIENT)
Dept: INTERNAL MEDICINE CLINIC | Facility: HOSPITAL | Age: 37
End: 2022-10-25

## 2022-10-25 DIAGNOSIS — Z20.822 SUSPECTED COVID-19 VIRUS INFECTION: ICD-10-CM

## 2022-10-25 DIAGNOSIS — Z20.822 SUSPECTED COVID-19 VIRUS INFECTION: Primary | ICD-10-CM

## 2022-10-25 LAB — SARS-COV-2 RNA RESP QL NAA+PROBE: NOT DETECTED

## 2022-10-26 NOTE — TELEPHONE ENCOUNTER
Results and RTW guidelines:    COVID RESULT:    [x] Viewed by employee in [address] RTW plan and instructions as indicated on triage call. Manager notified. Estimated RTW date:  10/25/2022  [] Discussed with employee   [] Unable to reach by phone.   Sent via [employer] message      Test type:    [x] Rapid         [] Alinity         [] Outside test:       [x] NEGATIVE     Ordered Alinity retest?  []Yes   [x] No (skip to RTW)   Ordered Rapid retest?   []Yes   [x] No (skip to RTW)           Dated to be taken:      If Yes, PLACE ORDER NOW and instruct the following:  -Originally Symptomatic or Now Symptoms:   -RTW when sx improve- fever free for 24 hours w/o medications, Diarrhea/Vomiting for 24 hours w/o medications     -Originally  Asymptomatic       -Asymptomatic AND Vaccinated or Unvaccinated or Prior infection in past 90 days:                                           -May work and continue to monitor symptoms for the next 14 days.                                            -Rapid test day 2, rapid test day 5 (day 0 - exposure)

## 2022-11-22 ENCOUNTER — IMMUNIZATION (OUTPATIENT)
Dept: LAB | Facility: HOSPITAL | Age: 37
End: 2022-11-22
Attending: PREVENTIVE MEDICINE
Payer: COMMERCIAL

## 2022-11-22 DIAGNOSIS — Z23 NEED FOR VACCINATION: Primary | ICD-10-CM

## 2022-11-22 PROCEDURE — 90471 IMMUNIZATION ADMIN: CPT

## 2022-12-15 ENCOUNTER — LAB ENCOUNTER (OUTPATIENT)
Dept: LAB | Facility: HOSPITAL | Age: 37
End: 2022-12-15
Attending: PREVENTIVE MEDICINE

## 2022-12-15 ENCOUNTER — TELEPHONE (OUTPATIENT)
Dept: INTERNAL MEDICINE CLINIC | Facility: HOSPITAL | Age: 37
End: 2022-12-15

## 2022-12-15 DIAGNOSIS — Z20.822 SUSPECTED COVID-19 VIRUS INFECTION: Primary | ICD-10-CM

## 2022-12-15 DIAGNOSIS — Z20.822 SUSPECTED COVID-19 VIRUS INFECTION: ICD-10-CM

## 2022-12-15 LAB — SARS-COV-2 RNA RESP QL NAA+PROBE: NOT DETECTED

## 2022-12-15 NOTE — TELEPHONE ENCOUNTER
Results and RTW guidelines:    COVID RESULT:    [] Viewed by employee in [facility]. RTW plan and instructions as indicated on triage call. Manager notified. Estimated RTW date:   [x] Discussed with employee   [] Unable to reach by phone. Sent via [employer] message      Test type:    [x] Rapid         [] Alinity         [] Outside test:       [x] NEGATIVE     Ordered Alinity retest?  []Yes   [x] No (skip to RTW)   Ordered Rapid retest?   []Yes   [x] No (skip to RTW)           Dated to be taken:      If Yes, PLACE ORDER NOW and instruct the following:  -Originally Symptomatic or Now Symptoms:   -RTW when sx improve- fever free for 24 hours w/o medications, Diarrhea/Vomiting for 24 hours w/o medications    -Originally  Asymptomatic  -Asymptomatic AND Vaccinated or Unvaccinated or Prior infection in past 90 days:   -May work and continue to monitor symptoms for the next 14 days.                                         -Rapid test day 2, rapid test day 5 (day 0 - exposure)    [] Positive     - Employee should quarantine at home for at least 5 days (day 1 is day after sx onset) , follow the CDC guidelines for cleaning and                              quarantining; see CDC.gov   -This employee may RTW on day 6 if asymptomatic or mildly symptomatic (with improving symptoms). Call Employee Health on day 5 if unable to return on day 6 after                      symptom onset.    -This employee needs to call Employee Health on day 5 after symptom onset. The employee needs to be cleared by Employee Health. - Monitor symptoms and temperature                 - Notify PCP of result                 - Seek emergent care with worsening symptoms   - If employee is still experiencing severe symptoms on day 5 must make a RTW appt with Employee Health, Employee will not be cleared if:    1. Has consistent cough, shortness of breath or fatigue that restricts your physical activities    2. Is still feeling \"unwell\"    3.  Within 15 days of hospitalization for COVID    4. Within 20 days of intubation for COVID    5. Still has a fever, vomiting or diarrhea   - Keep communication open with management about RTW and if symptoms worsen                - If outside testing completed, bring a copy of result to RTW appointment           Notes:     RTW PLAN:    []  If COVID positive results, off work minimum of 5 days from positive test or onset of symptoms (day 0)        On day 5, if asymptomatic or mildly symptomatic (with improving symptoms) may return to work day 6          On day 5, if symptomatic, call Employee Health for RTW screening        []  COVID positive result - call Employee Health on day 5 after symptom onset. The employee needs to be cleared by Employee Health to RTW. [x] RTW immediately, continue to monitor for sx  [] RTW when sx improve; must be fever free for 24 hours w/o medications, Diarrhea/Vomiting free for 24 hours w/o medications  [] Alinity ordered; continue to monitor sx and call for new/worsening sx.   Discuss RTW guidelines with manager  [] May continue to work  [] Follow up with PCP  [] Home until further instruction from hotline with Alinity results  INSTRUCTIONS PROVIDED:  [x]  Plan as noted above  []  Length of time to obtain results   []  Quarantine instructions  []  Masking protocol   []  S/S of worsening infection/condition and importance of prompt medical re-evaluation including when to seek emergency care  [] If symptoms develop, stay home and call hotline for rapid test order    Estimated RTW date:      [x] The employee voiced understanding of above plan/instructions  [x] Manager Notified

## 2023-03-15 ENCOUNTER — TELEPHONE (OUTPATIENT)
Dept: SURGERY | Facility: CLINIC | Age: 38
End: 2023-03-15

## 2023-03-15 NOTE — TELEPHONE ENCOUNTER
Attempt to call regarding scheduling appointment for annual visits with providers. No answer. Will send letter.

## 2023-03-21 ENCOUNTER — LAB ENCOUNTER (OUTPATIENT)
Dept: LAB | Age: 38
End: 2023-03-21
Attending: PREVENTIVE MEDICINE
Payer: COMMERCIAL

## 2023-03-21 ENCOUNTER — TELEPHONE (OUTPATIENT)
Dept: INTERNAL MEDICINE CLINIC | Facility: HOSPITAL | Age: 38
End: 2023-03-21

## 2023-03-21 DIAGNOSIS — Z20.822 SUSPECTED 2019 NOVEL CORONAVIRUS INFECTION: ICD-10-CM

## 2023-03-21 DIAGNOSIS — Z20.822 SUSPECTED 2019 NOVEL CORONAVIRUS INFECTION: Primary | ICD-10-CM

## 2023-03-21 LAB — SARS-COV-2 RNA RESP QL NAA+PROBE: NOT DETECTED

## 2023-03-21 NOTE — TELEPHONE ENCOUNTER
Results and RTW guidelines:    COVID RESULT:    [x] Viewed by employee in [address] RTW plan and instructions as indicated on triage call. Manager notified. Estimated RTW date:   [] Discussed with employee   [x] Unable to reach by phone. Sent via [email or web address] message      Test type:    [x] Rapid         [] Alinity         [] Outside test:       [x] NEGATIVE     Ordered Alinity retest?  []Yes   [x] No (skip to RTW)   Ordered Rapid retest?   []Yes   [x] No (skip to RTW)                  Notes:     RTW PLAN:    []  If COVID positive results, off work minimum of 5 days from positive test or onset of symptoms (day 0)        On day 5, if asymptomatic or mildly symptomatic (with improving symptoms) may return to work day 6          On day 5, if symptomatic, call Employee Health for RTW screening        []  COVID positive result - call Employee Health on day 5 after symptom onset. The employee needs to be cleared by Employee Health to RTW. [x] RTW immediately, continue to monitor for sx  [] RTW when sx improve; must be fever free for 24 hours w/o medications, Diarrhea/Vomiting free for 24 hours w/o medications  [] Alinity ordered; continue to monitor sx and call for new/worsening sx.   Discuss RTW guidelines with manager  [] May continue to work  [] Follow up with PCP  [] Home until further instruction from hotline with Alinity results  INSTRUCTIONS PROVIDED:  [x]  Plan as noted above  []  Length of time to obtain results   []  Quarantine instructions  []  Masking protocol   []  S/S of worsening infection/condition and importance of prompt medical re-evaluation including when to seek emergency care  [] If symptoms develop, stay home and call hotline for rapid test order    Estimated RTW date:      [] The employee voiced understanding of above plan/instructions  [x] Manager Notified

## 2023-03-23 ENCOUNTER — APPOINTMENT (OUTPATIENT)
Dept: CT IMAGING | Facility: HOSPITAL | Age: 38
End: 2023-03-23
Attending: EMERGENCY MEDICINE
Payer: COMMERCIAL

## 2023-03-23 ENCOUNTER — HOSPITAL ENCOUNTER (EMERGENCY)
Facility: HOSPITAL | Age: 38
Discharge: HOME | End: 2023-03-23
Attending: EMERGENCY MEDICINE
Payer: COMMERCIAL

## 2023-03-23 VITALS
RESPIRATION RATE: 18 BRPM | HEIGHT: 64 IN | HEART RATE: 76 BPM | BODY MASS INDEX: 30.73 KG/M2 | OXYGEN SATURATION: 98 % | DIASTOLIC BLOOD PRESSURE: 79 MMHG | WEIGHT: 180 LBS | TEMPERATURE: 98 F | SYSTOLIC BLOOD PRESSURE: 122 MMHG

## 2023-03-23 DIAGNOSIS — R10.84 ABDOMINAL PAIN, GENERALIZED: Primary | ICD-10-CM

## 2023-03-23 LAB
ALBUMIN SERPL-MCNC: 4 G/DL (ref 3.4–5)
ALBUMIN/GLOB SERPL: 1 {RATIO} (ref 1–2)
ALP LIVER SERPL-CCNC: 63 U/L
ALT SERPL-CCNC: 30 U/L
ANION GAP SERPL CALC-SCNC: 3 MMOL/L (ref 0–18)
AST SERPL-CCNC: 16 U/L (ref 15–37)
B-HCG UR QL: NEGATIVE
BASOPHILS # BLD AUTO: 0.03 X10(3) UL (ref 0–0.2)
BASOPHILS NFR BLD AUTO: 0.3 %
BILIRUB SERPL-MCNC: 0.9 MG/DL (ref 0.1–2)
BILIRUB UR QL STRIP.AUTO: NEGATIVE
BUN BLD-MCNC: 10 MG/DL (ref 7–18)
CALCIUM BLD-MCNC: 9.6 MG/DL (ref 8.5–10.1)
CHLORIDE SERPL-SCNC: 107 MMOL/L (ref 98–112)
CLARITY UR REFRACT.AUTO: CLEAR
CO2 SERPL-SCNC: 30 MMOL/L (ref 21–32)
COLOR UR AUTO: YELLOW
CREAT BLD-MCNC: 0.76 MG/DL
EOSINOPHIL # BLD AUTO: 0.06 X10(3) UL (ref 0–0.7)
EOSINOPHIL NFR BLD AUTO: 0.7 %
ERYTHROCYTE [DISTWIDTH] IN BLOOD BY AUTOMATED COUNT: 12.4 %
GFR SERPLBLD BASED ON 1.73 SQ M-ARVRAT: 103 ML/MIN/1.73M2 (ref 60–?)
GLOBULIN PLAS-MCNC: 4.1 G/DL (ref 2.8–4.4)
GLUCOSE BLD-MCNC: 113 MG/DL (ref 70–99)
GLUCOSE UR STRIP.AUTO-MCNC: NEGATIVE MG/DL
HCT VFR BLD AUTO: 42.5 %
HGB BLD-MCNC: 14.6 G/DL
IMM GRANULOCYTES # BLD AUTO: 0.01 X10(3) UL (ref 0–1)
IMM GRANULOCYTES NFR BLD: 0.1 %
LIPASE SERPL-CCNC: 44 U/L (ref 13–75)
LYMPHOCYTES # BLD AUTO: 2.42 X10(3) UL (ref 1–4)
LYMPHOCYTES NFR BLD AUTO: 27.1 %
MCH RBC QN AUTO: 32.3 PG (ref 26–34)
MCHC RBC AUTO-ENTMCNC: 34.4 G/DL (ref 31–37)
MCV RBC AUTO: 94 FL
MONOCYTES # BLD AUTO: 0.56 X10(3) UL (ref 0.1–1)
MONOCYTES NFR BLD AUTO: 6.3 %
NEUTROPHILS # BLD AUTO: 5.84 X10 (3) UL (ref 1.5–7.7)
NEUTROPHILS # BLD AUTO: 5.84 X10(3) UL (ref 1.5–7.7)
NEUTROPHILS NFR BLD AUTO: 65.5 %
NITRITE UR QL STRIP.AUTO: NEGATIVE
OSMOLALITY SERPL CALC.SUM OF ELEC: 290 MOSM/KG (ref 275–295)
PH UR STRIP.AUTO: 6 [PH] (ref 5–8)
PLATELET # BLD AUTO: 266 10(3)UL (ref 150–450)
POTASSIUM SERPL-SCNC: 4.2 MMOL/L (ref 3.5–5.1)
PROT SERPL-MCNC: 8.1 G/DL (ref 6.4–8.2)
PROT UR STRIP.AUTO-MCNC: NEGATIVE MG/DL
RBC # BLD AUTO: 4.52 X10(6)UL
RBC UR QL AUTO: NEGATIVE
SODIUM SERPL-SCNC: 140 MMOL/L (ref 136–145)
SP GR UR STRIP.AUTO: 1.02 (ref 1–1.03)
UROBILINOGEN UR STRIP.AUTO-MCNC: 4 MG/DL
WBC # BLD AUTO: 8.9 X10(3) UL (ref 4–11)

## 2023-03-23 PROCEDURE — 87086 URINE CULTURE/COLONY COUNT: CPT | Performed by: EMERGENCY MEDICINE

## 2023-03-23 PROCEDURE — 96361 HYDRATE IV INFUSION ADD-ON: CPT

## 2023-03-23 PROCEDURE — 99284 EMERGENCY DEPT VISIT MOD MDM: CPT

## 2023-03-23 PROCEDURE — 74177 CT ABD & PELVIS W/CONTRAST: CPT | Performed by: EMERGENCY MEDICINE

## 2023-03-23 PROCEDURE — 81025 URINE PREGNANCY TEST: CPT

## 2023-03-23 PROCEDURE — 80053 COMPREHEN METABOLIC PANEL: CPT

## 2023-03-23 PROCEDURE — 80053 COMPREHEN METABOLIC PANEL: CPT | Performed by: EMERGENCY MEDICINE

## 2023-03-23 PROCEDURE — 85025 COMPLETE CBC W/AUTO DIFF WBC: CPT

## 2023-03-23 PROCEDURE — 81001 URINALYSIS AUTO W/SCOPE: CPT | Performed by: EMERGENCY MEDICINE

## 2023-03-23 PROCEDURE — 85025 COMPLETE CBC W/AUTO DIFF WBC: CPT | Performed by: EMERGENCY MEDICINE

## 2023-03-23 PROCEDURE — 83690 ASSAY OF LIPASE: CPT | Performed by: EMERGENCY MEDICINE

## 2023-03-23 PROCEDURE — 96374 THER/PROPH/DIAG INJ IV PUSH: CPT

## 2023-03-23 PROCEDURE — 83690 ASSAY OF LIPASE: CPT

## 2023-03-23 RX ORDER — KETOROLAC TROMETHAMINE 15 MG/ML
15 INJECTION, SOLUTION INTRAMUSCULAR; INTRAVENOUS ONCE
Status: COMPLETED | OUTPATIENT
Start: 2023-03-23 | End: 2023-03-23

## 2023-03-23 RX ORDER — ONDANSETRON 4 MG/1
4 TABLET, ORALLY DISINTEGRATING ORAL EVERY 4 HOURS PRN
Qty: 10 TABLET | Refills: 0 | Status: SHIPPED | OUTPATIENT
Start: 2023-03-23 | End: 2023-03-30

## 2023-03-23 NOTE — DISCHARGE INSTRUCTIONS
Zofran for nausea  Tylenol for pain  Diet as tolerated  Return if worse  Follow-up with primary care physician early next week

## 2023-08-31 ENCOUNTER — OFFICE VISIT (OUTPATIENT)
Dept: FAMILY MEDICINE CLINIC | Facility: CLINIC | Age: 38
End: 2023-08-31
Payer: COMMERCIAL

## 2023-08-31 VITALS
SYSTOLIC BLOOD PRESSURE: 120 MMHG | WEIGHT: 193 LBS | OXYGEN SATURATION: 99 % | HEART RATE: 94 BPM | DIASTOLIC BLOOD PRESSURE: 80 MMHG | HEIGHT: 64 IN | RESPIRATION RATE: 16 BRPM | BODY MASS INDEX: 32.95 KG/M2

## 2023-08-31 DIAGNOSIS — Z11.3 SCREEN FOR STD (SEXUALLY TRANSMITTED DISEASE): ICD-10-CM

## 2023-08-31 DIAGNOSIS — Z30.41 SURVEILLANCE FOR BIRTH CONTROL, ORAL CONTRACEPTIVES: Primary | ICD-10-CM

## 2023-08-31 LAB
CONTROL LINE PRESENT WITH A CLEAR BACKGROUND (YES/NO): YES YES/NO
KIT LOT #: NORMAL NUMERIC
PREGNANCY TEST, URINE: NEGATIVE
T PALLIDUM AB SER QL IA: NONREACTIVE

## 2023-08-31 PROCEDURE — 87491 CHLMYD TRACH DNA AMP PROBE: CPT | Performed by: STUDENT IN AN ORGANIZED HEALTH CARE EDUCATION/TRAINING PROGRAM

## 2023-08-31 PROCEDURE — 87389 HIV-1 AG W/HIV-1&-2 AB AG IA: CPT | Performed by: STUDENT IN AN ORGANIZED HEALTH CARE EDUCATION/TRAINING PROGRAM

## 2023-08-31 PROCEDURE — 87591 N.GONORRHOEAE DNA AMP PROB: CPT | Performed by: STUDENT IN AN ORGANIZED HEALTH CARE EDUCATION/TRAINING PROGRAM

## 2023-08-31 PROCEDURE — 86780 TREPONEMA PALLIDUM: CPT | Performed by: STUDENT IN AN ORGANIZED HEALTH CARE EDUCATION/TRAINING PROGRAM

## 2023-08-31 RX ORDER — ACETAMINOPHEN AND CODEINE PHOSPHATE 120; 12 MG/5ML; MG/5ML
0.35 SOLUTION ORAL DAILY
Qty: 84 TABLET | Refills: 1 | Status: SHIPPED | OUTPATIENT
Start: 2023-08-31

## 2023-09-01 LAB
C TRACH DNA SPEC QL NAA+PROBE: NEGATIVE
N GONORRHOEA DNA SPEC QL NAA+PROBE: NEGATIVE

## 2023-09-28 ENCOUNTER — TELEPHONE (OUTPATIENT)
Dept: FAMILY MEDICINE CLINIC | Facility: CLINIC | Age: 38
End: 2023-09-28

## 2023-11-14 ENCOUNTER — TELEPHONE (OUTPATIENT)
Dept: INTERNAL MEDICINE CLINIC | Facility: HOSPITAL | Age: 38
End: 2023-11-14

## 2023-11-14 ENCOUNTER — LAB ENCOUNTER (OUTPATIENT)
Dept: LAB | Age: 38
End: 2023-11-14
Attending: PREVENTIVE MEDICINE
Payer: COMMERCIAL

## 2023-11-14 DIAGNOSIS — Z20.822 SUSPECTED COVID-19 VIRUS INFECTION: Primary | ICD-10-CM

## 2023-11-14 DIAGNOSIS — Z20.822 SUSPECTED COVID-19 VIRUS INFECTION: ICD-10-CM

## 2023-11-14 LAB — SARS-COV-2 RNA RESP QL NAA+PROBE: NOT DETECTED

## 2023-11-14 NOTE — TELEPHONE ENCOUNTER
[] [address] [facility]  []NOVA   [x] [facility]  Manager : Ashley Starkey    HAVE YOU RECEIVED THE COVID-19 Vaccine? Yes [x]    No []          If yes, date(s) received:    04/13/2021; 05/04/2021        Which vaccine:  Pfizer [x]     Derrick Patel []    J&J []      SYMPTOMS (reported via dashboard):  [] asymptomatic  [x] symptomatic  [] GI symptoms only    Symptom onset date: 11/13/2023  Fever   > 100F             Yes []      Cough                          Yes [x]      Shortness of breath  Yes []      Congestion                 Yes [x]      Runny nose                Yes [x]        Loss of Smell              Yes []        Loss of Taste             Yes []       Sore throat                 Yes [x]       Fatigue                        Yes [x]       Body Aches                Yes []        Chills                           Yes []        Headache                   Yes []             GI symptoms             Yes []     No [x]                     Nausea   []          Vomiting            []                                    Diarrhea  []          Upset stomach []      Employee has positive COVID Exposure? Yes []     No [x]    Date of exposure:   []  Coworker                       [] patient                        [] Family/friend    Employee has a history of Covid?   Yes [x]     No []   If Yes, when: 08/2022    When was the last shift you worked?: 11/14/2023      PLAN:     COVID-19 testing ordered: [x] Rapid    [] Alinity              Date test is to be taken:   11/14/2023    []  Outside testing                           Notes:    INSTRUCTIONS PROVIDED:    [x]  Employee was instructed to call Central scheduling at [phone] or use [email or web address] to make an appointment for their testing   [x]  May return to work if employee views negative result in [address] and remains fever, vomiting, and diarrhea free  []  May continue to work if remains asymptomatic and views negative result in [address]  []  Follow up for condition update when resulting  []  If symptoms develop, stay home and call hotline for rapid test order  []  If COVID positive results, off work minimum of 5 days from positive test or onset of symptoms (day 0)     [x]  Plan noted above  [x]  Length of time to obtain results  []  Quarantine instructions  [x]  S/S of worsening infection/condition and importance of prompt medical re-evaluation including when to seek emergency care.    [x] The employee voiced understanding No

## 2023-11-14 NOTE — TELEPHONE ENCOUNTER
Results and RTW guidelines:    COVID RESULT:    [x] Viewed by employee in [address] RTW plan and instructions as indicated on triage call. Manager notified. Estimated RTW date: 11/14/2023  [] Discussed with employee   [] Unable to reach by phone.   Sent via [employer] message      Test type:    [x] Rapid         [] Alinity         [] Outside test:       [x] NEGATIVE     Ordered Alinity retest?  []Yes   [x] No (skip to RTW)   Ordered Rapid retest?   []Yes   [x] No (skip to RTW)           Dated to be taken:      If Yes, PLACE ORDER NOW and instruct the following:  -Originally Symptomatic or Now Symptoms:   -RTW when sx improve- fever free for 24 hours w/o medications, Diarrhea/Vomiting for 24 hours w/o medications    -Originally  Asymptomatic  -Asymptomatic AND Vaccinated or Unvaccinated or Prior infection in past 90 days:   -May work and continue to monitor symptoms for the next 10 days.                                         -Alinity test day 2, Alinity test day 5 (day 0 - exposure)

## 2024-02-19 DIAGNOSIS — Z30.41 SURVEILLANCE FOR BIRTH CONTROL, ORAL CONTRACEPTIVES: ICD-10-CM

## 2024-02-19 RX ORDER — ACETAMINOPHEN AND CODEINE PHOSPHATE 120; 12 MG/5ML; MG/5ML
0.35 SOLUTION ORAL DAILY
Qty: 84 TABLET | Refills: 1 | Status: SHIPPED | OUTPATIENT
Start: 2024-02-19

## 2024-02-19 NOTE — TELEPHONE ENCOUNTER
Last office visit: 8/31/23    Protocol: Pass  Requested medication(s) are due for refill today: Yes-pharrmacy change  Requested medication(s) are on the active medication list same strength, form, dose/ sig: Yes/Requested medication(s) are managed by provider: Yes  Patient has already received a courtsey refill: No}  NOV:none

## 2024-07-12 ENCOUNTER — TELEPHONE (OUTPATIENT)
Dept: INTERNAL MEDICINE CLINIC | Facility: HOSPITAL | Age: 39
End: 2024-07-12

## 2024-07-12 ENCOUNTER — LAB ENCOUNTER (OUTPATIENT)
Dept: LAB | Age: 39
End: 2024-07-12
Attending: PREVENTIVE MEDICINE
Payer: COMMERCIAL

## 2024-07-12 DIAGNOSIS — Z20.822 SUSPECTED COVID-19 VIRUS INFECTION: ICD-10-CM

## 2024-07-12 DIAGNOSIS — Z20.822 SUSPECTED COVID-19 VIRUS INFECTION: Primary | ICD-10-CM

## 2024-07-12 LAB — SARS-COV-2 RNA RESP QL NAA+PROBE: NOT DETECTED

## 2024-07-12 NOTE — TELEPHONE ENCOUNTER
[x] EH  []NOVA   [] [facility]  Manager : Ermelinda Kohler    [] Direct Patient Care  [x]Indirect Patient Contact   [] Non-Clinical/No Patient Contact    High Risk Area:    [] Yes   [x] No    For Direct Patient Care ONLY: Have you been fitted with an N95 mask? [] Yes  [x]No      HAVE YOU RECEIVED THE COVID-19 Vaccine? Yes [x]    No []          If yes, date(s) received: 04/13/2021 05/04/2021            Which vaccine:  Pfizer [x]     Moderna []    J&J []      SYMPTOMS (reported via dashboard):  [] asymptomatic  [x] symptomatic  [] GI symptoms only    Symptom onset date: 07/11/2024  Fever   > 100F             Yes []      Cough                          Yes []      Shortness of breath  Yes []      Congestion                 Yes [x]      Runny nose                Yes [x]        Loss of Smell              Yes []        Loss of Taste             Yes []       Sore throat                 Yes [x]       Fatigue                        Yes [x]       Body Aches                Yes []        Chills                           Yes []        Headache                   Yes [x]             GI symptoms             Yes []     No [x]                     Nausea   []          Vomiting            []                                    Diarrhea  []          Upset stomach []      Employee reported COVID Exposure?  Yes []     No []    Date of exposure:   []  Coworker                       [] patient                        [] Family/friend    PPE:   [] N95 Mask/PAPR  [] Standard Mask  [] Eyewear  [] None    Within 6 feet for >15 minutes? [] Yes []  No    Is this a true exposure? []  Yes []  No    When was the last shift you worked?: 07/11/2024    Employee has a history of Covid?  Yes [x]     No []   If Yes, when: 08/2023    PLAN:     COVID-19 testing ordered:   [x] Rapid      [] Alinity              Date test is to be taken:   07/12/2024    []  No testing required at this time  []  Outside testing                           Notes:    INSTRUCTIONS  PROVIDED:    [x]  Employee was instructed to call Central scheduling at [phone] or use [employer] to make an appointment for their testing   [x]  May return to work if employee views negative result in MyChart and remains fever, vomiting, and diarrhea free  []  May continue to work if remains asymptomatic and views negative result in MyChart  []  Follow up for condition update when resulting  []  If symptoms develop, stay home and call hotline for rapid test order  []  If COVID positive results, off work minimum of 5 days from positive test or onset of symptoms (day 0)     [x]  Plan noted above  [x]  Length of time to obtain results  []  Quarantine instructions  [x]  S/S of worsening infection/condition and importance of prompt medical re-evaluation including when to seek emergency care.   [x] The employee voiced understanding

## 2024-07-15 NOTE — TELEPHONE ENCOUNTER
Results and RTW guidelines:    COVID RESULT:    [x] Viewed by employee in [employer].  RTW plan and instructions as indicated on triage call.  Manager notified.  Estimated RTW date: 07/12/2024  [] Discussed with employee   [] Unable to reach by phone.  Sent via [employer] message      Test type:    [x] Rapid         [] Alinity         [] Outside test:       [x] NEGATIVE     Ordered Alinity retest?  []Yes   [x] No (skip to RTW)   Ordered Rapid retest?   []Yes   [x] No (skip to RTW)           Dated to be taken:      If Yes, PLACE ORDER NOW and instruct the following:  -Originally Symptomatic or Now Symptoms:   -RTW when sx improve- fever free for 24 hours w/o medications, Diarrhea/Vomiting for 24 hours w/o medications    -Originally  Asymptomatic  -Asymptomatic AND Vaccinated or Unvaccinated or Prior infection in past 90 days:   -May work and continue to monitor symptoms for the next 10 days.                                         -Alinity test day 2, Alinity test day 5 (day 0 - exposure)

## 2024-08-13 DIAGNOSIS — Z30.41 SURVEILLANCE FOR BIRTH CONTROL, ORAL CONTRACEPTIVES: ICD-10-CM

## 2024-08-13 RX ORDER — ACETAMINOPHEN AND CODEINE PHOSPHATE 120; 12 MG/5ML; MG/5ML
0.35 SOLUTION ORAL DAILY
Qty: 84 TABLET | Refills: 1 | Status: SHIPPED | OUTPATIENT
Start: 2024-08-13

## 2024-08-13 NOTE — TELEPHONE ENCOUNTER
Last office visit: 8/31/2023   Protocol: pass  Requested medication(s) are due for refill today: yes  Requested medication(s) are on the active medication list same strength, form, dose/ sig: yes  Requested medication(s) are managed by provider: yes  Patient has already received a courtsey refill: no     NOV: none   Last Labs: 8/31/2023  Asked to Return: n/a

## 2024-08-30 ENCOUNTER — TELEPHONE (OUTPATIENT)
Dept: INTERNAL MEDICINE CLINIC | Facility: HOSPITAL | Age: 39
End: 2024-08-30

## 2024-08-30 ENCOUNTER — LAB ENCOUNTER (OUTPATIENT)
Dept: LAB | Age: 39
End: 2024-08-30
Attending: PREVENTIVE MEDICINE
Payer: COMMERCIAL

## 2024-08-30 DIAGNOSIS — Z20.822 SUSPECTED COVID-19 VIRUS INFECTION: Primary | ICD-10-CM

## 2024-08-30 DIAGNOSIS — Z20.822 SUSPECTED COVID-19 VIRUS INFECTION: ICD-10-CM

## 2024-08-30 LAB — SARS-COV-2 RNA RESP QL NAA+PROBE: DETECTED

## 2024-08-30 NOTE — TELEPHONE ENCOUNTER
[x] EH  []NOVA   [] [facility]  Manager : Sheri Kohler    [] Direct Patient Care  [x]Indirect Patient Contact   [] Non-Clinical/No Patient Contact    For Direct Patient Care ONLY: Have you been fitted with an N95 mask within the last 12 months? [] Yes  [x]No      High Risk Area:    [] Yes   [x] No    HAVE YOU RECEIVED THE COVID-19 Vaccine? Yes [x]    No []          If yes, date(s) received: 04/13/2021 05/04/2021           Which vaccine:  Pfizer [x]     Moderna []    J&J []      SYMPTOMS (reported via dashboard):  [] asymptomatic  [x] symptomatic  [] GI symptoms only    Symptom onset date: 08/29/2024  Fever   > 100F             Yes []      Cough                          Yes [x]      Shortness of breath  Yes []      Congestion                 Yes [x]      Runny nose                Yes [x]        Loss of Smell              Yes []        Loss of Taste             Yes []       Sore throat                 Yes [x]       Fatigue                        Yes [x]       Body Aches                Yes [x]        Chills                           Yes []        Headache                   Yes [x]             GI symptoms             Yes []     No [x]                     Nausea   []          Vomiting            []                                    Diarrhea  []          Upset stomach []      Employee reported COVID Exposure?  Yes []     No [x]    Date of exposure:   []  Coworker                       [] patient                        [] Family/friend    PPE:   [] N95 Mask/PAPR  [] Standard Mask  [] Eyewear  [] None    Within 6 feet for >15 minutes? [] Yes []  No    Is this a true exposure? []  Yes []  No    When was the last shift you worked?: 08/29/2024    Employee has a history of Covid?  Yes [x]     No []   If Yes, when: 08/2022    Employee is immunocompromised?  Yes []     No [x]      PLAN:     COVID-19 testing ordered:   [x] Rapid      [] Alinity              Date test is to be taken:   08/30/2024    []  No testing required at this  time  []  Outside testing                           Notes:    INSTRUCTIONS PROVIDED:    [x]  Employee was instructed to call Central scheduling at [phone] or use [employer] (remove any insurance information listed) to make an appointment for their testing   [x]  May return to work if employee views negative result in MyChart and remains fever, vomiting, and diarrhea free  []  May continue to work if remains asymptomatic and views negative result in MyChart  []  Follow up for condition update when resulting  []  If symptoms develop, stay home and call hotline for rapid test order  []  If COVID positive results, off work minimum of 5 days from positive test or onset of symptoms (day 0)     [x]  Plan noted above  [x]  Length of time to obtain results  []  Quarantine instructions  [x]  S/S of worsening infection/condition and importance of prompt medical re-evaluation including when to seek emergency care.   [x] The employee voiced understanding

## 2024-08-31 NOTE — TELEPHONE ENCOUNTER
Results and RTW guidelines:    COVID RESULT:    [x] Viewed by employee in [employer].  RTW plan and instructions as indicated on triage call.  Manager notified.  Estimated RTW date:   [x] Discussed with employee   [] Unable to reach by phone.  Sent via [employer] message      Test type:    [x] Rapid         [] Alinity         [] Outside test:       [x] Positive     - Employee should quarantine at home for at least 5 days (day 1 is day after sx onset) , follow the CDC guidelines for cleaning and                              quarantining; see CDC.gov   -This employee may RTW on day 6 if asymptomatic or mildly symptomatic (with improving symptoms).  Call Employee Health on day 5 if unable to return on day 6 after                      symptom onset.    -This employee needs to call Employee Health on day 5 after symptom onset.  The employee needs to be cleared by Employee Health.  - If employee is still experiencing severe symptoms on day 5 must make a RTW appt with Employee Health, Employee will not be cleared if:    1. Has consistent cough, shortness of breath or fatigue that restricts your physical activities    2. Is still feeling \"unwell\"    3. Within 15 days of hospitalization for COVID    4. Within 20 days of intubation for COVID    5. Still has a fever, vomiting or diarrhea   - Keep communication open with management about RTW and if symptoms worsen  - Monitor symptoms and temperature                 - Notify PCP of result                 - Seek emergent care with worsening symptoms                - If outside testing completed, bring a copy of result to RTW appointment           Notes:     RTW PLAN:    [x]  If COVID positive results, off work minimum of 5 days from positive test or onset of symptoms (day 0)        On day 5, if asymptomatic or mildly symptomatic (with improving symptoms) may return to work day 6          On day 5, if symptomatic, call Employee Health for RTW screening        []  COVID positive result  - call Employee Health on day 5 after symptom onset.  The employee needs to be cleared by Employee Health to RTW.  [] RTW immediately, continue to monitor for sx  [] RTW when sx improve; must be fever free for 24 hours w/o medications, Diarrhea/Vomiting free for 24 hours w/o medications  [] Alinity ordered; continue to monitor sx and call for new/worsening sx.  Discuss RTW guidelines with manager  [] May continue to work  [] Follow up with PCP  [] Home until further instruction from hotline with Alinity results  INSTRUCTIONS PROVIDED:  [x]  Plan as noted above  []  Length of time to obtain results   [x]  Quarantine instructions  [x]  Masking protocol   [x]  S/S of worsening infection/condition and importance of prompt medical re-evaluation including when to seek emergency care  [] If symptoms develop, stay home and call hotline for rapid test order    Estimated RTW date: 9/4     [x] The employee voiced understanding of above plan/instructions  [x] Manager Notified

## 2024-11-13 ENCOUNTER — TELEPHONE (OUTPATIENT)
Dept: SURGERY | Facility: CLINIC | Age: 39
End: 2024-11-13

## 2025-01-29 DIAGNOSIS — Z30.41 SURVEILLANCE FOR BIRTH CONTROL, ORAL CONTRACEPTIVES: ICD-10-CM

## 2025-01-29 NOTE — TELEPHONE ENCOUNTER
Last office visit: 8/31/23   Protocol: fail  Requested medication(s) are due for refill today:yes  Requested medication(s) are on the active medication list same strength, form, dose/ sig: yes  Requested medication(s) are managed by provider: yes  Patient has already received a courtsey refill: yes    NOV: none    Asked to Return: not stated

## 2025-02-06 ENCOUNTER — OFFICE VISIT (OUTPATIENT)
Dept: FAMILY MEDICINE CLINIC | Facility: CLINIC | Age: 40
End: 2025-02-06
Payer: COMMERCIAL

## 2025-02-06 VITALS
BODY MASS INDEX: 35.68 KG/M2 | SYSTOLIC BLOOD PRESSURE: 122 MMHG | DIASTOLIC BLOOD PRESSURE: 80 MMHG | HEART RATE: 77 BPM | HEIGHT: 64 IN | RESPIRATION RATE: 16 BRPM | OXYGEN SATURATION: 99 % | WEIGHT: 209 LBS

## 2025-02-06 DIAGNOSIS — Z00.00 LABORATORY EXAMINATION ORDERED AS PART OF A ROUTINE GENERAL MEDICAL EXAMINATION: ICD-10-CM

## 2025-02-06 DIAGNOSIS — M54.9 CHRONIC BILATERAL BACK PAIN, UNSPECIFIED BACK LOCATION: ICD-10-CM

## 2025-02-06 DIAGNOSIS — G89.29 CHRONIC BILATERAL BACK PAIN, UNSPECIFIED BACK LOCATION: ICD-10-CM

## 2025-02-06 DIAGNOSIS — Z12.31 ENCOUNTER FOR SCREENING MAMMOGRAM FOR MALIGNANT NEOPLASM OF BREAST: ICD-10-CM

## 2025-02-06 DIAGNOSIS — Z30.41 SURVEILLANCE FOR BIRTH CONTROL, ORAL CONTRACEPTIVES: ICD-10-CM

## 2025-02-06 DIAGNOSIS — Z00.00 ANNUAL PHYSICAL EXAM: Primary | ICD-10-CM

## 2025-02-06 LAB
CONTROL LINE PRESENT WITH A CLEAR BACKGROUND (YES/NO): YES YES/NO
KIT LOT #: NORMAL NUMERIC
PREGNANCY TEST, URINE: NEGATIVE

## 2025-02-06 RX ORDER — ACETAMINOPHEN AND CODEINE PHOSPHATE 120; 12 MG/5ML; MG/5ML
0.35 SOLUTION ORAL DAILY
Qty: 84 TABLET | Refills: 3 | Status: SHIPPED | OUTPATIENT
Start: 2025-02-06

## 2025-02-06 NOTE — PROGRESS NOTES
[facility] Office Note    HPI:     Dora Deal is a 39 year old female presenting for annual exam and back pain.    Patient states she has noted off and on back pains, mostly mid-back to lower back. Present for several years. States when it occurs it can vary in duration, seems to be activity dependent (prolonged standing or heavy lifting can trigger pain). Described as achy pain. Not associated with particular movement. Denies numbness, weakness, tingling.     Requesting refill of birth control.     Diet: trying to do better  Exercise: not as regular   Tobacco: denies   Alcohol: 2 drinks per week  Other Drugs: denies     Aspirin use? (age 50-59 with ASCVD risk 10% or greater): not indicated  Breast cancer (biennial screening mammography for women aged 40 to 74 years): mammogram ordered  Cervical cancer screen? (q3 yrs age 21-29, q3 or q5 w/HPV cotesting age 30-65): needs referral   Colonoscopy screen? (age 45-75 or earlier based on risk): not indicated  Depression screen? (PHQ-2 or PHQ-9): PHQ-2 Score of 0  Diabetes screen? (age 35 to 70 who are overweight or obese): A1c ordered  Fall Prevention? (age 65 and older): not indicated  Lipid panel? (age 20-45 with increased risk of CHD or older than 45): ordered  Lung cancer screen if 50-80 w/ 20 pack year smoking history and currently smoking or quit in last 15 yrs? not indicated  Osteoporosis screen? (DEXA in postmenopausal 65 or older at increased risk): not indicated    HISTORY:  Past Medical History:    Diet controlled gestational diabetes mellitus (GDM) in third trimester (HCC)    Gestational diabetes (HCC)    History of [place]-en-Y gastric bypass    for weight loss    Insulin controlled White classification A2 gestational diabetes mellitus (GDM) (HCC)    Modified White class B pregestational diabetes mellitus (HCC)    MFM Consult.  Level 2 U/S and Fetal ECHO.  HbA1C, 24 hour Urine for Protein and CrCl.  Weekly NSTs starting @ 32  weeks.    IMPRESSION: IUP at 13w6d based on her LMP dating which is consistent with her ultrasound today (as well as her early US) Normal NT ultrasound Maternal morbid obesity complicating pregnancy (BMI 40) Type II diabetes, newly diagnosed  RECOMMENDATIONS: Continue care with Dr. Rose    OA (osteoarthritis)    Obesity (BMI 30-39.9)    Obesity, Class III, BMI 40-49.9 (morbid obesity) (HCC)    BONNIE (obstructive sleep apnea)    Osteoarthritis    Sleep apnea    CPAP       Past Surgical History:   Procedure Laterality Date    Cholecystectomy  2003    D & c  04/29/2016    Lap gastric bypass/chris-en-y  06/07/2021    Dr Leonardo, [facility]    Removal gallbladder        Family History   Problem Relation Age of Onset    Diabetes Mother     Kidney Disease Mother     Hypertension Mother     Psychiatric Mother         Depression    Other (BDR- had one eye removed) Mother     Diabetes Maternal Grandmother     Glaucoma Neg     Macular degeneration Neg       Social History:   Social History     Socioeconomic History    Marital status: Single   Tobacco Use    Smoking status: Former     Current packs/day: 0.00     Average packs/day: 0.1 packs/day for 1 year (0.1 ttl pk-yrs)     Types: Cigarettes     Start date: 5/17/2013     Quit date: 5/17/2014     Years since quitting: 10.7    Smokeless tobacco: Never   Vaping Use    Vaping status: Never Used   Substance and Sexual Activity    Alcohol use: Yes     Comment: occasionally     Drug use: No   Other Topics Concern    Caffeine Concern Yes     Comment: tea    Pt has a pacemaker No    Pt has a defibrillator No    Reaction to local anesthetic No        Medications (Active prior to today's visit):  Current Outpatient Medications   Medication Sig Dispense Refill    Norethindrone 0.35 MG Oral Tab Take 1 tablet (0.35 mg total) by mouth daily. 84 tablet 3    Multiple Vitamins-Minerals (BARIATRIC MULTIVITAMINS/IRON OR) Take 1 tablet by mouth 4 (four) times daily.          Allergies:  Allergies[1]      ROS:   Review of Systems   Constitutional:  Negative for chills and fever.   Musculoskeletal:         +on and off back pain     Otherwise see HPI    PHYSICAL EXAM:   /80 (BP Location: Left arm, Patient Position: Sitting, Cuff Size: adult)   Pulse 77   Resp 16   Ht 5' 4\" (1.626 m)   Wt 209 lb (94.8 kg)   LMP 01/21/2025 (Approximate)   SpO2 99%   BMI 35.87 kg/m²  Estimated body mass index is 35.87 kg/m² as calculated from the following:    Height as of this encounter: 5' 4\" (1.626 m).    Weight as of this encounter: 209 lb (94.8 kg).   Vital signs reviewed.Appears stated age, well groomed.    Physical Exam  Constitutional:       General: She is not in acute distress.  HENT:      Nose: Nose normal.      Mouth/Throat:      Mouth: Mucous membranes are moist.      Pharynx: Oropharynx is clear.   Eyes:      Conjunctiva/sclera: Conjunctivae normal.      Pupils: Pupils are equal, round, and reactive to light.   Cardiovascular:      Rate and Rhythm: Normal rate and regular rhythm.      Heart sounds: Normal heart sounds.   Pulmonary:      Effort: Pulmonary effort is normal.      Breath sounds: Normal breath sounds.   Abdominal:      General: Bowel sounds are normal.      Palpations: Abdomen is soft.      Tenderness: There is no abdominal tenderness. There is no guarding or rebound.   Musculoskeletal:      Comments: +No significant tenderness to spinal or paraspinal regions. ROM intact at spine.     Neurological:      Mental Status: She is alert.           ASSESSMENT/PLAN:     39 year old female presenting for annual exam and back pain issues.       1. Annual physical exam  - encourage well-balanced diet with fruits/vegetables, limited fried/fatty foods, and limited take-out   - encourage at least 150 minutes of moderate intensity aerobic activity weekly     2. Laboratory examination ordered as part of a routine general medical examination  - CBC With Differential With Platelet;  Future  - Comp Metabolic Panel (14); Future  - Hemoglobin A1C; Future  - TSH W Reflex To Free T4; Future  - Lipid Panel; Future    3. Encounter for screening mammogram for malignant neoplasm of breast  - [facility] MARLEN 2D+3D SCREENING BILAT (CPT=77067/43893); Future    4. Chronic bilateral back pain, unspecified back location  - likely more muscular strain  - provided handout with home physical therapy exercises   - Physical Therapy Referral - [facility]    5. Surveillance for birth control, oral contraceptives  - Norethindrone 0.35 MG Oral Tab; Take 1 tablet (0.35 mg total) by mouth daily.  Dispense: 84 tablet; Refill: 3  - Urine Preg Test (negative)      Follow-up: in 1 year for next annual or sooner as needed     Outcome: Patient verbalizes understanding. Patient is notified to call with any questions, complications, allergies, or worsening or changing symptoms.  Patient is to call with any side effects or complications from the treatments as a result of today.     Total length of visit/charting: approximately 23 min    Kelechi Hubbard MD, 02/06/25, 3:57 PM      Please note that portions of this note may have been completed with a voice recognition program. Efforts were made to edit the dictations but occasionally words are mis-transcribed.         [1] No Known Allergies

## 2025-02-13 ENCOUNTER — TELEPHONE (OUTPATIENT)
Dept: PHYSICAL THERAPY | Age: 40
End: 2025-02-13

## 2025-02-14 RX ORDER — NORETHINDRONE 0.35 MG/1
0.35 TABLET ORAL DAILY
Qty: 84 TABLET | Refills: 1 | OUTPATIENT
Start: 2025-02-14

## 2025-08-29 ENCOUNTER — TELEPHONE (OUTPATIENT)
Dept: INTERNAL MEDICINE CLINIC | Facility: HOSPITAL | Age: 40
End: 2025-08-29

## 2025-08-29 ENCOUNTER — LAB ENCOUNTER (OUTPATIENT)
Dept: LAB | Age: 40
End: 2025-08-29
Attending: PREVENTIVE MEDICINE

## 2025-08-29 DIAGNOSIS — Z20.822 SUSPECTED COVID-19 VIRUS INFECTION: Primary | ICD-10-CM

## 2025-08-29 DIAGNOSIS — Z20.822 SUSPECTED COVID-19 VIRUS INFECTION: ICD-10-CM

## 2025-08-29 LAB — SARS-COV-2 RNA RESP QL NAA+PROBE: NOT DETECTED

## (undated) DEVICE — SUTURE PDS II 1 CT-1

## (undated) DEVICE — SOL  .9 1000ML BTL

## (undated) DEVICE — WOUND RETRACTOR AND PROTECTOR: Brand: ALEXIS WOUND PROTECTOR-RETRACTOR

## (undated) DEVICE — HOVERMATT 34IN SINGLE USE

## (undated) DEVICE — ENDOPATH ECHELON ENDOSCOPIC LINEAR CUTTER RELOADS, WHITE, 60MM: Brand: ECHELON ENDOPATH

## (undated) DEVICE — ENDOSTITCH SURGIDAC 0

## (undated) DEVICE — ENDOSCOPY PORT CONNECTOR FOR OLYMPUS® SCOPES: Brand: ERBE

## (undated) DEVICE — TROCARS: Brand: KII® BALLOON BLUNT TIP SYSTEM

## (undated) DEVICE — SUTURE PASSOR WITH GUIDE

## (undated) DEVICE — SUTURE ENDOSTITCH 2-0 VIO 48\"

## (undated) DEVICE — DERMABOND LIQUID ADHESIVE

## (undated) DEVICE — TROCAR: Brand: KII FIOS FIRST ENTRY

## (undated) DEVICE — GOWN SURG AERO BLUE PERF XLG

## (undated) DEVICE — STAPLER EEA XL 25MM

## (undated) DEVICE — Device: Brand: JELCO

## (undated) DEVICE — VIOLET BRAIDED (POLYGLACTIN 910), SYNTHETIC ABSORBABLE SUTURE: Brand: COATED VICRYL

## (undated) DEVICE — HYBRID TUBING/CAP SET FOR OLYMPUS® SCOPES: Brand: ERBE

## (undated) DEVICE — SPCMN DTCHBLE POUCH 5X7 500ML

## (undated) DEVICE — SUTURE SILK 2-0 SH

## (undated) DEVICE — 3M™ STERI-DRAPE™ INSTRUMENT POUCH 1018L: Brand: STERI-DRAPE™

## (undated) DEVICE — [HIGH FLOW INSUFFLATOR,  DO NOT USE IF PACKAGE IS DAMAGED,  KEEP DRY,  KEEP AWAY FROM SUNLIGHT,  PROTECT FROM HEAT AND RADIOACTIVE SOURCES.]: Brand: PNEUMOSURE

## (undated) DEVICE — SOL  .9 1000ML BAG

## (undated) DEVICE — ENCORE® LATEX MICRO SIZE 7, STERILE LATEX POWDER-FREE SURGICAL GLOVE: Brand: ENCORE

## (undated) DEVICE — LAP CHOLE: Brand: MEDLINE INDUSTRIES, INC.

## (undated) DEVICE — ECHELON FLEX POWERED PLUS LONG ARTICULATING ENDOSCOPIC LINEAR CUTTER, 60MM: Brand: ECHELON FLEX

## (undated) DEVICE — ENDOPATH ECHELON ENDOSCOPIC LINEAR CUTTER RELOADS, BLUE, 60MM: Brand: ECHELON ENDOPATH

## (undated) DEVICE — UNDYED BRAIDED (POLYGLACTIN 910), SYNTHETIC ABSORBABLE SUTURE: Brand: COATED VICRYL

## (undated) DEVICE — DRAPE SHEET LG

## (undated) DEVICE — MEDI-VAC NON-CONDUCTIVE SUCTION TUBING: Brand: CARDINAL HEALTH

## (undated) DEVICE — SUTURING DEVICE: Brand: ENDO STITCH

## (undated) DEVICE — ENCORE® LATEX MICRO SIZE 7.5, STERILE LATEX POWDER-FREE SURGICAL GLOVE: Brand: ENCORE

## (undated) DEVICE — DISSECTOR SONICISION CORDLESS

## (undated) DEVICE — PROXIMATE SKIN STAPLERS (35 WIDE) CONTAINS 35 STAINLESS STEEL STAPLES (FIXED HEAD): Brand: PROXIMATE

## (undated) DEVICE — ENDOPATH ECHELON ENDOSCOPIC LINEAR CUTTER RELOADS, GREEN, 60MM: Brand: ECHELON ENDOPATH

## (undated) DEVICE — STERILE SURGICAL LUBRICANT, METAL TUBE: Brand: SURGILUBE

## (undated) NOTE — IP AVS SNAPSHOT
[facility]            (For Outpatient Use Only) Initial Admit Date: 6/28/2017   Inpt/Obs Admit Date: Inpt: N/A / Obs: N/A   Discharge Date:    Hospital Acct:  [de-identified]   MRN: [de-identified]   CSN: [id number]        ENCOUNTER  [facility] Class: [facility] Financial Class: [facility]    June 28, 2017

## (undated) NOTE — IP AVS SNAPSHOT
Patient Demographics     Address  [address] [place] [address] Phone  [phone] [facility]) *Preferred* E-mail Address  [email or web address]      Emergency Contact(s)     Name Relation Home Work 1 [facility] [phone]      Yelena Teresa Commonly known as:  HUMULIN N,NOVOLIN N      Inject 4 Units into the skin nightly. Nabila Soliz MD   [    ]   [    ]   [    ]   [    ]     PRENATAL VITAMINS PLUS 27-1 MG Tabs      Take 1 tablet by mouth daily.     [    ]   [    ]   [    ]   [    ] 9/7/2017 10:00 AM [facility] NST TRIAGE  [facility]    9/20/2017 1:30 PM Katty Sunshine [address] [facility]  [address]

## (undated) NOTE — Clinical Note
Continue care with Dr. Chan Wooten Weekly NST at 32 weeks, increase to twice weekly at 34 weeks Monthly growth US in the third trimester Fetal echocardiogram at 22-24 weeks Consider referral for a sleep study (BMI 40, snoring and frequent waking) She was inst

## (undated) NOTE — IP AVS SNAPSHOT
[facility]            (For Outpatient Use Only) Initial Admit Date: 7/19/2017   Inpt/Obs Admit Date: Inpt: N/A / Obs: N/A   Discharge Date:    Hospital Acct:  [de-identified]   MRN: [de-identified]   CSN: [id number]        ENCOUNTER  [facility] Class[facility] Class: [facility]    July 19, 2017

## (undated) NOTE — IP AVS SNAPSHOT
Patient Demographics     Address Phone E-mail Address    [address] [id number] [phone] [facility]) *Preferred* [email or web address]      Emergency Contact(s)     Name Relation Home Work 1 [facility] [phone]      Yelena Teresa Chiquis Ferguson     [    ]    [    ]    [    ]    [    ]       PRENATAL VITAMINS PLUS 27-1 MG Tabs        Take 1 tablet by mouth daily.       [    ]    [    ]    [    ]    [    ]            Where to Get Your Medications      These medications were sent to 9/20/2017 1:30 PM Katty Sunshine [address] [facility]  [address]

## (undated) NOTE — Clinical Note
Hi can you confirm that patient needs bmi > 35 due to BONNIE and OA documented in chart or does she have to be BMI > 40?  I stopped wt loss med in case, but she is set for 6/7 and I don't want to hold her back from additional weight loss prior if I don't have

## (undated) NOTE — MR AVS SNAPSHOT
[facility]  [address] [place] [id number] [phone]               Thank you for choosing us for your health care visit with Ray Szymanski.  Kota Meza MD.  We are glad to serve you and happy to provide you with this summary of your visit POC Urinalysis, Manual Dip without microscopy [77260]    Complete by:  As directed    Assoc Dx: Other normal pregnancy, not first, first trimester [Z34.81]           Chlamydia/GC PCR Combo    Complete by: Apr 07, 2017 (Approximate)    Assoc Dx:   Other n URINALYSIS NONAUTO W/O SCOPE      Component Value Standard Range & Units    GLUCOSE (URINE DIPSTICK) neg Negative mg/dL    BILIRUBIN neg Negative    KETONES (URINE DIPSTICK) neg Negative mg/dL    SPECIFIC GRAVITY 1.015 1.005 - 1.030    OCCULT BLOOD neg Ne walking, light jogging, cycling, swimming, etc.) for a goal of at least 150 minutes per week. Moderation of alcohol consumption Men: limit to <= 2 drinks* per day. Women and lighter weight persons: limit to <= 1 drink* per day.                       Heal

## (undated) NOTE — MR AVS SNAPSHOT
[facility]  [address] [place] [address] [phone]               Thank you for choosing us for your health care visit with Adenike Saldivar.  Jose Alberto Johnson MD.  We are glad to serve you and happy to provide you with this summary of your visit Pregnancy Test, Urine Positive     Control Line Present with a clear background (yes/no) Yes Yes/No    Kit Lot # [id number] Numeric    Kit Expiration Date 04/10/2018 Date                  MyChart     Visit [facility]  You can access your MyChart to more actively

## (undated) NOTE — Clinical Note
Pulm informed patient she should stop oral contraception due to blood clot risk. Patient sexually active. Do you feel this is necessary? Just wanted to double check as risk for pregnancy is there although she has agreed to use condoms, too.  What are your f

## (undated) NOTE — IP AVS SNAPSHOT
[address]  [address] [facility] [phone]                Discharge Summary   5/31/2017    Jesus Mejia           Admission Information        Provider Department    5/31/2017 Bob Simms MD [facility] ASK your doctor about these medications        Instructions Authorizing Provider    Morning Afternoon Evening As Needed    Acetone (Urine) Test Strp   Commonly known as:  KETOSTIX        Test once daily    Tatiana Rutledge     [    ]    [    ]    [    ] Future Labs/Procedures Expected by Expires     [facility] DT FTL ANT SLG(CPT=76811)  5/31/2017 (Approximate) 5/31/2018    Scheduling Instructions:     To schedule a test at any [facility], call Central Scheduling at (3 0.4 (03/21/17)  0.1 (03/21/17)  0.1      Metabolic Lab Results  (Last result in the past 90 days)    HgbA1C Glucose BUN Creatinine Calcium Alkaline Phosph AST    (05/04/17)  5.3 (04/18/17)  105 (H) (04/18/17)  5 (L) (04/18/17)  0.60 (04/18/17)  9.1 (04/18/ Call [phone] for help. [employer] is NOT to be used for urgent needs.   For medical emergencies, dial 911.             _____________________________________________________________________________    Medication Side Effects - Medications to be taken at

## (undated) NOTE — Clinical Note
Continue care with Dr. María Singh Weekly NST at 32 weeks, increase to twice weekly at 34 weeks Monthly growth US She was instructed to send in her BS log weekly for review Insulin:  NPH - 16 units in AM and 22 units at HS;  Novolog - 10 units at breakfast an

## (undated) NOTE — IP AVS SNAPSHOT
Patient Demographics     Address  [address] [place] [address] Phone  [phone] [facility]) *Preferred* E-mail Address  [email or web address]      Emergency Contact(s)     Name Relation Home Work 1 [facility] [phone]      Luzmaria De Leon PRENATAL VITAMINS PLUS 27-1 MG Tabs      Take 1 tablet by mouth daily.     [    ]   [    ]   [    ]   [    ]               - MAR ACTION REPORT  (last 24 hrs)    ** No medications to display **            Recent Vital Signs    Flowsheet Row Most Recent Value

## (undated) NOTE — MR AVS SNAPSHOT
[facility]  [address] [place] [address], [address] [facility]               Thank you for choosing us for your health care visit with Cailin Navarro. DO Jen.   We are glad to serve you and happy to provide you with this summary [place] [address]   Phone:  [phone]   Fax:  [phone]    Diagnoses:  Weight gain   Order:  [address] [facility]    Pily Mejía MD   [address] [facility] [address]   Phone:  [phone]         Referral Orders      Normal Orders T

## (undated) NOTE — MR AVS SNAPSHOT
After Visit Summary   12/5/2019    Alexa Tobias    MRN: [id number]           Visit Information     Date & Time  12/5/2019  4:00 PM Provider  Mingo Garcia MD [facility], [facility], [address] [place] [address]  Dept.  Phone  [phone] Choose whole grain products Foods high in sodium   Water is best for hydration Fast food.    Eat at home when possible     Tips for increasing your physical activity – Adults who are physically active are less likely to develop some chronic diseases than ad using your mobile device or computer   using Relay      e-VISITS  Communicate with a [facility] physician or EP[facility]ANIO  online. The physician will respond and provide a treatment plan within a few hours.            Treatment for mild   illness or injury that does   n

## (undated) NOTE — MR AVS SNAPSHOT
[facility]  [address] [place] [address]  [phone]               Thank you for choosing us for your health care visit with Nurse. We are glad to serve you and happy to provide you with this summary of your visit.   Please help u Assoc Dx:  Encounter for supervision of normal first pregnancy in first trimester [Z34.01]           Urinalysis, Routine    Complete by:   Mar 08, 2017    Assoc Dx:  Encounter for supervision of normal first pregnancy in first trimester [Z34.01] Visit [facility] online at  [email or web address]

## (undated) NOTE — IP AVS SNAPSHOT
Deondre Regalado [address]  [phone]  [phone]               Thank you for choosing us for your health care visit with [facility].   We are glad to serve you and happy to provide PRENATAL VITAMINS PLUS 27-1 MG Tabs   Take 1 tablet by mouth daily. MyChart     Visit MyChart  You can access your MyChart to more actively manage your health care and view more details from this visit by going to [email or web address]

## (undated) NOTE — IP AVS SNAPSHOT
Patient Demographics     Address  [address] [place] [address] Phone  [phone] [facility]) *Preferred* E-mail Address  [email or web address]      Emergency Contact(s)     Name Relation Home Work [address] [place] [address] [phone]      Nish Fuentes PRENATAL VITAMINS PLUS 27-1 MG Tabs      Take 1 tablet by mouth daily.     [    ]   [    ]   [    ]   [    ]               - MAR ACTION REPORT  (last 24 hrs)    ** No medications to display **            Recent Vital Signs    Flowsheet Row Most Recent Value

## (undated) NOTE — MR AVS SNAPSHOT
[facility]  [address] [facility] [place] [address] [phone]               Thank you for choosing us for your health care visit with KENNEDY Costa. We are glad to serve you and happy to provide you with this summary of your visit. US LIVER (CPT=76705)    Complete by:  Apr 26, 2017 (Approximate)    Assoc Dx:  Elevated LFTs [R94.5]           Hepatitis A B + C profile    Complete by:  Apr 26, 2017 (Approximate)    Assoc Dx:  Elevated LFTs [R94.5]                 Scheduling Instruction 118/79 mmHg 84 5' 4.5\" (1.638 m) 226 lb (102.513 kg) 38.21 kg/m2         Current Medications          This list is accurate as of: 4/26/17  1:25 PM.  Always use your most recent med list.                Acetone (Urine) Test Strp   Test once daily   Commo

## (undated) NOTE — LETTER
9/14/2017          To Whom It May Concern:    Alexa Tobias is currently under my medical care and may not return to work at this time. Please excuse Juwan for 2 days. She may return to work on 09/17/2017.   Activity is restricted as follows: non

## (undated) NOTE — Clinical Note
Continue care with Dr. Olga Gavin EDD back to LMP due date of 10/19/17 Weekly NST at 32 weeks, increase to twice weekly at 34 weeks Monthly growth US in the third trimester Fetal echocardiogram at 22-24 weeks Level II ultrasound 20 weeks Consider refe